# Patient Record
Sex: FEMALE | Race: WHITE | NOT HISPANIC OR LATINO | Employment: OTHER | URBAN - METROPOLITAN AREA
[De-identification: names, ages, dates, MRNs, and addresses within clinical notes are randomized per-mention and may not be internally consistent; named-entity substitution may affect disease eponyms.]

---

## 2018-01-18 ENCOUNTER — OFFICE VISIT (OUTPATIENT)
Dept: URGENT CARE | Age: 73
End: 2018-01-18
Payer: COMMERCIAL

## 2018-01-18 PROCEDURE — 99213 OFFICE O/P EST LOW 20 MIN: CPT | Performed by: FAMILY MEDICINE

## 2018-01-19 NOTE — PROGRESS NOTES
Assessment   1  Acute upper respiratory infection (465 9) (J06 9)    Plan   Acute upper respiratory infection    · Amoxicillin 500 MG Oral Capsule; TAKE 1 CAPSULE 3 TIMES DAILY UNTIL GONE   · Benzonatate 200 MG Oral Capsule; TAKE 1 CAPSULE 2-3 TIMES DAILY (every 8-12    hours as needed)    Discussion/Summary   Discussion Summary:    Rest, limit activity  3 times a day until finished ( please take probiotics)  Perles 2-3 times a day (every 8-12 hours) as needed for cough  or Advil / Motrin as needed  follow-up with family physician  go to the hospital emergency department if worse  Medication Side Effects Reviewed: Possible side effects of new medications were reviewed with the patient/guardian today  Understands and agrees with treatment plan: The treatment plan was reviewed with the patient/guardian  The patient/guardian understands and agrees with the treatment plan    Counseling Documentation With Imm: The patient was counseled regarding  Chief Complaint   1  Cough  Chief Complaint Free Text Note Form: cough x2 weeks      History of Present Illness   HPI: Cough for the past 2 weeks    Hospital Based Practices Required Assessment:      Abuse And Domestic Violence Screen       Yes, the patient is safe at home  -- The patient states no one is hurting them  Depression And Suicide Screen  No, the patient has not had thoughts of hurting themself  No, the patient has not felt depressed in the past 7 days  Prefered Language is  [place]  Primary Language is  English  Review of Systems   Focused-Female:      Constitutional: as noted in HPI       ENT: no ear ache, no loss of hearing, no nosebleeds or nasal discharge, no sore throat or hoarseness  Cardiovascular: no complaints of slow or fast heart rate, no chest pain, no palpitations, no leg claudication or lower extremity edema  Respiratory: cough, but-- as noted in HPI        Breasts: no complaints of breast pain, breast lump or nipple discharge  Gastrointestinal: no complaints of abdominal pain, no constipation, no nausea or diarrhea, no vomiting, no bloody stools  Genitourinary: no complaints of dysuria, no incontinence, no pelvic pain, no dysmenorrhea, no vaginal discharge or abnormal vaginal bleeding  Musculoskeletal: no complaints of arthralgia, no myalgia, no joint swelling or stiffness, no limb pain or swelling  Integumentary: no complaints of skin rash or lesion, no itching or dry skin, no skin wounds  Neurological: no complaints of headache, no confusion, no numbness or tingling, no dizziness or fainting  ROS Reviewed:    ROS reviewed  Active Problems   1  Closed fracture of distal end of radius (813 42) (Q33 317A)    Past Medical History   Active Problems And Past Medical History Reviewed: The active problems and past medical history were reviewed and updated today  Family History   Family History Reviewed: The family history was reviewed and updated today  Social History   Social History Reviewed: The social history was reviewed and updated today  The social history was reviewed and is unchanged  Surgical History   1  History of Laparoscopic Colpopexy   2  History of Tonsillectomy  Surgical History Reviewed: The surgical history was reviewed and updated today  Current Meds    1  Pantoprazole Sodium 40 MG Oral Tablet Delayed Release Recorded  Medication List Reviewed: The medication list was reviewed and updated today  Allergies   1  No Known Drug Allergies    Vitals   Signs   Recorded: [id number] 10:49AM   Temperature: 99 3 F  Heart Rate: 78  Respiration: 16  Systolic: 555  Diastolic: 74  Height: 5 ft 8 in  Weight: 164 lb   BMI Calculated: 24 94  BSA Calculated: 1 88  O2 Saturation: 96  LMP: 2000    Physical Exam        Constitutional Patient appears ill        Ears, Nose, Mouth, and Throat      External inspection of ears and nose: Normal  Otoscopic examination: Tympanic membranes translucent with normal light reflex  Canals patent without erythema  -- slight nasal congestion  -- injection of the oropharynx  Pulmonary      Respiratory effort: No increased work of breathing or signs of respiratory distress  -- coarse breath sounds  Cardiovascular      Auscultation of heart: Normal rate and rhythm, normal S1 and S2, without murmurs  Lymphatic      Palpation of lymph nodes in neck: No lymphadenopathy  Skin fair color and turgor  Neurologic grossly intact, no nuchal rigidity        Psychiatric      Orientation to person, place, and time: Normal        Mood and affect: Normal        Signatures    Electronically signed by : Hema Chapa DO; Jan 18 2018 11:27AM EST                       (Author)

## 2018-01-23 VITALS
RESPIRATION RATE: 16 BRPM | HEIGHT: 68 IN | BODY MASS INDEX: 24.86 KG/M2 | TEMPERATURE: 99.3 F | DIASTOLIC BLOOD PRESSURE: 74 MMHG | HEART RATE: 78 BPM | WEIGHT: 164 LBS | SYSTOLIC BLOOD PRESSURE: 147 MMHG | OXYGEN SATURATION: 96 %

## 2018-01-25 ENCOUNTER — OFFICE VISIT (OUTPATIENT)
Dept: FAMILY MEDICINE CLINIC | Facility: CLINIC | Age: 73
End: 2018-01-25
Payer: COMMERCIAL

## 2018-01-25 VITALS
RESPIRATION RATE: 16 BRPM | SYSTOLIC BLOOD PRESSURE: 112 MMHG | DIASTOLIC BLOOD PRESSURE: 78 MMHG | WEIGHT: 165.2 LBS | HEIGHT: 68 IN | HEART RATE: 88 BPM | BODY MASS INDEX: 25.04 KG/M2

## 2018-01-25 DIAGNOSIS — Z11.59 ENCOUNTER FOR HEPATITIS C SCREENING TEST FOR LOW RISK PATIENT: ICD-10-CM

## 2018-01-25 DIAGNOSIS — Z13.820 OSTEOPOROSIS SCREENING: ICD-10-CM

## 2018-01-25 DIAGNOSIS — Z12.31 ENCOUNTER FOR MAMMOGRAM TO ESTABLISH BASELINE MAMMOGRAM: ICD-10-CM

## 2018-01-25 DIAGNOSIS — Z00.00 MEDICARE ANNUAL WELLNESS VISIT, INITIAL: Primary | ICD-10-CM

## 2018-01-25 PROCEDURE — 1101F PT FALLS ASSESS-DOCD LE1/YR: CPT | Performed by: FAMILY MEDICINE

## 2018-01-25 PROCEDURE — G0438 PPPS, INITIAL VISIT: HCPCS | Performed by: FAMILY MEDICINE

## 2018-01-25 PROCEDURE — 1036F TOBACCO NON-USER: CPT | Performed by: FAMILY MEDICINE

## 2018-01-25 PROCEDURE — 3008F BODY MASS INDEX DOCD: CPT | Performed by: FAMILY MEDICINE

## 2018-01-25 RX ORDER — AMOXICILLIN 500 MG/1
1 CAPSULE ORAL 3 TIMES DAILY
COMMUNITY
Start: 2018-01-18 | End: 2018-01-28

## 2018-01-25 RX ORDER — PANTOPRAZOLE SODIUM 40 MG/1
TABLET, DELAYED RELEASE ORAL DAILY
COMMUNITY

## 2018-01-25 NOTE — PROGRESS NOTES
Assessment/Plan:        1  Medicare annual wellness visit, initial  - Lipid Panel with Direct LDL reflex; Future  - Comprehensive metabolic panel  - CBC and differential; Future  - Lipid Panel with Direct LDL reflex  - CBC and differential    2  Encounter for mammogram to establish baseline mammogram  - Mammo screening bilateral w cad    3  Encounter for hepatitis C screening test for low risk patient    - Hepatitis C Qualitative by PCR; Future  - Hepatitis C Qualitative by PCR    4  Osteoporosis screening    - DXA bone density spine hip and pelvis    Subjective:Pt here for Medicare Wellness Visit     Patient ID: Abbi Ortega is a 81 y o  female  Review of Systems   Constitutional: Negative  HENT: Negative  Eyes: Negative  Respiratory: Negative  Cardiovascular: Negative  Gastrointestinal: Negative  Endocrine: Negative  Genitourinary: Negative  Musculoskeletal: Negative  Skin: Negative  Allergic/Immunologic: Negative  Neurological: Negative  Hematological: Negative  Psychiatric/Behavioral: Negative  Objective:     Physical Exam   Constitutional: She is oriented to person, place, and time  Vital signs are normal  She appears well-developed and well-nourished  HENT:   Head: Normocephalic and atraumatic  Nose: Nose normal    Mouth/Throat: Oropharynx is clear and moist    Eyes: Pupils are equal, round, and reactive to light  Neck: Normal range of motion  Neck supple  No thyromegaly present  Cardiovascular: Normal rate and regular rhythm  No murmur heard  Pulmonary/Chest: Effort normal and breath sounds normal    Abdominal: Soft  Bowel sounds are normal    Musculoskeletal: Normal range of motion  She exhibits no edema or deformity  Neurological: She is alert and oriented to person, place, and time  She has normal reflexes  Skin: Skin is warm  No rash noted  No erythema  Psychiatric: She has a normal mood and affect   Her behavior is normal  HPI:  Juani Bell is a 81 y o  female here for her Initial Wellness Visit  Past Medical History:   Diagnosis Date    Arthritis     Endometriosis     GERD (gastroesophageal reflux disease)      Past Surgical History:   Procedure Laterality Date    COLPOPEXY      laparoscopic    TONSILLECTOMY       Family History   Problem Relation Age of Onset    Heart failure Mother     Dementia Mother     Atrial fibrillation Mother     Pancreatic cancer Father     Sleep apnea Brother     Atrial fibrillation Brother     Breast cancer Paternal Aunt      History   Smoking Status    Former Smoker   Smokeless Tobacco    Never Used     History   Alcohol Use    3 0 oz/week    5 Glasses of wine per week      History   Drug Use No       Current Outpatient Prescriptions   Medication Sig Dispense Refill    amoxicillin (AMOXIL) 500 mg capsule Take 1 capsule by mouth 3 (three) times a day      pantoprazole (PROTONIX) 40 mg tablet Take by mouth       No current facility-administered medications for this visit        Allergies   Allergen Reactions    Seasonal Ic [Cholestatin] Sneezing     Immunization History   Administered Date(s) Administered    Pneumococcal Conjugate PCV 7 01/24/2015    Tdap 01/24/2014    Zoster 01/24/2015       Patient Care Team:  Raz Santiago MD as PCP - General (Family Medicine)  Raz Santiago MD (Family Medicine)    Medicare Screening Tests and Risk Assessments:  AWV Clinical     ISAR:   Previous hospitalizations?:  No       Once in a Lifetime Medicare Screening:   EKG performed?:  No    AAA screening performed? (if performed, please add date to Health Maintenance):  No       Medicare Screening Tests and Risk Assessment:   AAA Risk Assessment     Tobacco use (males only):  No   Age over 72 (males only):  No Family history of AAA:  No   Osteoporosis Risk Assessment     Female:  Yes   :  Yes :  No   Age over 48:  Yes Low body weight (<127lbs):  No   Tobacco use:  No Alcohol use:  Yes   Low calcium diet:  No PMHX of fractures:  No   FHX of fractures:  No    HIV Risk Assessment    None indicated:  Yes        Drug and Alcohol Use:   Tobacco use    Cigarettes:  former smoker    Smokeless:  never used smokeless tobacco    Tobacco use duration    Tobacco Cessation Readiness    Alcohol use    Alcohol use:  rare use    Concern about alcohol use:  No Tolerance to alcohol:  No   Attempts to cut down:  No Guilt about use:  No   Patient concern:  No Annoyed by criticism:  No   Morning drinking:  No Family concern:  No   Alcohol Treatment Readiness   Illicit Drug Use    Drug use:  never    Drug type:  no sedative use       Diet & Exercise:   Diet   What is your diet?:  Regular   How many servings a day of the following:   Fruits and Vegetables:  3-4    Coffee:  1    Exercise    Do you currently exercise?:  yes    Frequency:  occasional    Type of exercise:  cardio, walking       Cognitive Impairment Screening:   Anxiety screenings preformed:  Yes    Depression screening preformed:  Yes    Geriatric Depression scale score:  0    Depression screening results:  negative for symptoms   Cognitive Impairment Screening    Do you have difficulty learning or retaining new information?:  No Do you have difficulty handling new tasks?:  No   Do you have difficulty with reasoning?:  No Do you have difficulty with spatial ability and orientation?:  No   Do you have difficulty with language?:  No Do you have difficulty with behavior?:  No       Functional Ability/Level of Safety:   Hearing    Hearing difficulties:  Yes Bilateral:  slightly decreased   Left:  slightly decreased Right:  slightly decreased   Hearing aid:  No    Hearing Impairment Assessment    Hearing status:  No impairment   Current Activities    Status:  unlimited ADL's, unlimited driving, unlimited IADL's, unlimited social activities   Help needed with the folllowing:    Using the phone:  No Transportation:  No   Shopping:  No Preparing Meals: No   Doing Housework:  No Doing Laundry:  No   Managing Medications:  No Managing Money:  No   ADL    Feeding:  Independant   Oral hygiene and Facial grooming:  Independant   Bathing:  Independant   Upper Body Dressing:  Independant   Lower Body Dressing:  Independant   Toileting:  Independant   Bed Mobility:  Independant   Fall Risk   Have you fallen in the last 12 months?:  No Are you unsteady on your feet?:  No    Are you taking any medications that may cause fatigue or dizziness?:  No    Do you rush to the bathroom potentially risking a fall?:  No   Injury History   Polypharmacy:  No Antidepressant Use:  No   Sedative Use:  No Antihypertensive Use:  No   Previous Fall:  No Alcohol Use:  No   Deconditioning:  No Visual Impairment:  No   Cogitive Impairment:  No Mmobility Impairment:  No   Postural Hypotension:  No Urinary Incontinence:  No       Home Safety:   Are there hazards in your environment?:  No   If you fell, would you need help to get back up from the ground?:  No Do you have problems or concerns getting in/out of a bed, chair, tub, or toilet?:  No   Do you feel unsteady when walking?:  No Is your activity limited by pain?:  No   Do you have handrails and grab-bars in the home?:  No Are emergency numbers kept by the phone and regularly updated?:  No   Are you and/or family members aware of the dangers of smoking in bed?:  No Are firearms stored securely?:  No   Do you have working smoke alarms and fire extinguisher?:  No    Have you left the stove on unsupervised?:  No    Home Safety Risk Factors   Unfamilar with surroundings:  No Uneven floors:  No   Stairs or handrail saftey risk:  No Loose rugs:  No   Household clutter:  No Poor household lighting:  No   No grab bars in bathroom:  No Further evaluation needed:  No       Advanced Directives:   Advanced Directives    Living Will:  No Durable POA for healthcare:  No   Advanced directive:  No    Patient's End of Life Decisions    Reviewed with patient: Yes        Urinary Incontinence:   Do you have urinary incontinence?:  No Do you have incomplete emptying?:  No   Do you urinate frequently?:  No Do you have urinary urgency?:  No   Do you have urinary hesitancy?:  No Do you have dysuria (painful and/or difficult urination)?:  No   Do you have nocturia (waking up to urinate)?:  No Do you strain when urinating (have to push to urinate)?:  No   Do you have a weak stream when urinating?:  No Do you have intermittent streaming when urinating?:  No   Do you dribble urine after finishing?:  No    Do you have vaginal pressure?:  No Do you have vaginal dryness?:  No       Glaucoma:              Reviewed Updated  Lu's Prior Wellness Visits:   Last Medicare wellness visit information was reviewed, patient interviewed , no change since last AWV

## 2019-01-25 ENCOUNTER — LAB REQUISITION (OUTPATIENT)
Dept: LAB | Facility: HOSPITAL | Age: 74
End: 2019-01-25
Payer: COMMERCIAL

## 2019-01-25 DIAGNOSIS — K22.70 BARRETT'S ESOPHAGUS WITHOUT DYSPLASIA: ICD-10-CM

## 2019-01-25 PROCEDURE — 88305 TISSUE EXAM BY PATHOLOGIST: CPT | Performed by: PATHOLOGY

## 2019-01-31 ENCOUNTER — OFFICE VISIT (OUTPATIENT)
Dept: FAMILY MEDICINE CLINIC | Facility: CLINIC | Age: 74
End: 2019-01-31
Payer: COMMERCIAL

## 2019-01-31 VITALS
WEIGHT: 168 LBS | OXYGEN SATURATION: 97 % | DIASTOLIC BLOOD PRESSURE: 76 MMHG | TEMPERATURE: 98.6 F | HEART RATE: 82 BPM | HEIGHT: 68 IN | BODY MASS INDEX: 25.46 KG/M2 | RESPIRATION RATE: 15 BRPM | SYSTOLIC BLOOD PRESSURE: 108 MMHG

## 2019-01-31 DIAGNOSIS — Z13.220 LIPID SCREENING: ICD-10-CM

## 2019-01-31 DIAGNOSIS — Z23 ENCOUNTER FOR ADMINISTRATION OF VACCINE: ICD-10-CM

## 2019-01-31 DIAGNOSIS — Z11.59 ENCOUNTER FOR HEPATITIS C SCREENING TEST FOR LOW RISK PATIENT: ICD-10-CM

## 2019-01-31 DIAGNOSIS — Z12.31 VISIT FOR SCREENING MAMMOGRAM: ICD-10-CM

## 2019-01-31 DIAGNOSIS — Z00.00 MEDICARE ANNUAL WELLNESS VISIT, SUBSEQUENT: Primary | ICD-10-CM

## 2019-01-31 PROBLEM — K22.70 BARRETT'S ESOPHAGUS WITHOUT DYSPLASIA: Status: ACTIVE | Noted: 2019-01-31

## 2019-01-31 PROCEDURE — 3008F BODY MASS INDEX DOCD: CPT | Performed by: FAMILY MEDICINE

## 2019-01-31 PROCEDURE — 3725F SCREEN DEPRESSION PERFORMED: CPT | Performed by: FAMILY MEDICINE

## 2019-01-31 PROCEDURE — G0439 PPPS, SUBSEQ VISIT: HCPCS | Performed by: FAMILY MEDICINE

## 2019-01-31 PROCEDURE — 1160F RVW MEDS BY RX/DR IN RCRD: CPT

## 2019-01-31 PROCEDURE — 1036F TOBACCO NON-USER: CPT | Performed by: FAMILY MEDICINE

## 2019-01-31 PROCEDURE — G0009 ADMIN PNEUMOCOCCAL VACCINE: HCPCS

## 2019-01-31 PROCEDURE — 1170F FXNL STATUS ASSESSED: CPT | Performed by: FAMILY MEDICINE

## 2019-01-31 PROCEDURE — 1125F AMNT PAIN NOTED PAIN PRSNT: CPT | Performed by: FAMILY MEDICINE

## 2019-01-31 PROCEDURE — 4040F PNEUMOC VAC/ADMIN/RCVD: CPT

## 2019-01-31 PROCEDURE — 90732 PPSV23 VACC 2 YRS+ SUBQ/IM: CPT

## 2019-01-31 PROCEDURE — 1101F PT FALLS ASSESS-DOCD LE1/YR: CPT | Performed by: FAMILY MEDICINE

## 2019-01-31 RX ORDER — MELATONIN
1000 DAILY
COMMUNITY

## 2019-01-31 RX ORDER — RIBOFLAVIN (VITAMIN B2) 100 MG
100 TABLET ORAL DAILY
COMMUNITY
End: 2020-02-05

## 2019-01-31 NOTE — PROGRESS NOTES
Assessment and Plan:    Problem List Items Addressed This Visit     None      Visit Diagnoses     Medicare annual wellness visit, subsequent    -  Primary    Relevant Orders    Comprehensive metabolic panel    Lipid screening        Relevant Orders    Lipid Panel with Direct LDL reflex    Encounter for hepatitis C screening test for low risk patient        Relevant Orders    Hepatitis C antibody    Visit for screening mammogram        Relevant Orders    Mammo screening bilateral w cad    Encounter for administration of vaccine        Relevant Orders    PNEUMOCOCCAL POLYSACCHARIDE VACCINE 23-VALENT =>1YO SQ IM        Health Maintenance Due   Topic Date Due    Hepatitis C Screening  1945    MAMMOGRAM  1945    Pneumococcal PPSV23/PCV13 65+ Years / Low and Medium Risk (1 of 2 - PCV13) 10/24/2012    Medicare Annual Wellness Visit (AWV)  01/25/2019         HPI:  Devendra Ritchie is a 72 y o  female here for her Subsequent Wellness Visit      Patient Active Problem List   Diagnosis    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     Past Medical History:   Diagnosis Date    Arthritis     Endometriosis     GERD (gastroesophageal reflux disease)      Past Surgical History:   Procedure Laterality Date    COLPOPEXY      laparoscopic    TONSILLECTOMY       Family History   Problem Relation Age of Onset    Heart failure Mother     Dementia Mother     Atrial fibrillation Mother     Pancreatic cancer Father     Sleep apnea Brother     Atrial fibrillation Brother     Breast cancer Paternal Aunt      History   Smoking Status    Former Smoker   Smokeless Tobacco    Never Used     History   Alcohol Use    3 0 oz/week    5 Glasses of wine per week      History   Drug Use No       Current Outpatient Prescriptions   Medication Sig Dispense Refill    Ascorbic Acid (VITAMIN C) 100 MG tablet Take 100 mg by mouth daily      cholecalciferol (VITAMIN D3) 1,000 units tablet Take 1,000 Units by mouth daily      pantoprazole (PROTONIX) 40 mg tablet Take by mouth       No current facility-administered medications for this visit  Allergies   Allergen Reactions    Seasonal Ic [Cholestatin] Sneezing     Immunization History   Administered Date(s) Administered    Influenza 12/15/2018    Pneumococcal Conjugate PCV 7 01/24/2015    Tdap 01/24/2014    Zoster 01/24/2015       Patient Care Team:  Surekha Montana MD as PCP - General (Family Medicine)  Surekha Montana MD (Family Medicine)    Medicare Screening Tests and Risk Assessments:  Last Medicare Wellness visit information reviewed, patient interviewed, no change since last AWV  Health Risk Assessment:  Patient rates overall health as very good  Patient feels that their physical health rating is Slightly better  Eyesight was rated as Same  Hearing was rated as Slightly worse  Patient feels that their emotional and mental health rating is Much better  Pain experienced by patient in the last 7 days has been None  Patient states that she has experienced no weight loss or gain in last 6 months  Emotional/Mental Health:  Patient has been feeling nervous/anxious  PHQ-9 Depression Screening:    Frequency of the following problems over the past two weeks:      1  Little interest or pleasure in doing things: 0 - not at all      2  Feeling down, depressed, or hopeless: 0 - not at all  PHQ-2 Score: 0          Broken Bones/Falls: Fall Risk Assessment:    In the past year, patient has experienced: No history of falling in past year          Bladder/Bowel:  Patient has not leaked urine accidently in the last six months  Patient reports no loss of bowel control  Immunizations:  Patient has had a flu vaccination within the last year  Patient has received a pneumonia shot  Patient has received a shingles shot  Patient has not received tetanus/diphtheria shot  Home Safety:  Patient does not have trouble with stairs inside or outside of their home     Patient currently reports that there are no safety hazards present in home, working smoke alarms, working carbon monoxide detectors  Preventative Screenings:   No breast cancer screening performed, colon cancer screen completed, cholesterol screen completed, glaucoma eye exam completed,     Nutrition:  Current diet: Regular with servings of the following:    Medications:  Patient is not currently taking any over-the-counter supplements  Patient is able to manage medications  Lifestyle Choices:  Patient reports no tobacco use  Patient has not smoked or used tobacco in the past   Patient reports alcohol use  Alcohol use per week: a glass of wine a night   Patient drives a vehicle  Patient wears seat belt  Activities of Daily Living:  Can get out of bed by his or her self, able to dress self, able to make own meals, able to do own shopping, able to bathe self, can do own laundry/housekeeping, can manage own money, pay bills and track expenses    Previous Hospitalizations:  No hospitalization or ED visit in past 12 months        Advanced Directives:  Patient has decided on a power of   Patient has spoken to designated power of   Patient has completed advanced directive

## 2019-02-20 DIAGNOSIS — E78.00 PURE HYPERCHOLESTEROLEMIA: Primary | ICD-10-CM

## 2019-02-20 LAB
ALBUMIN SERPL-MCNC: 4.5 G/DL (ref 3.6–5.1)
ALBUMIN/GLOB SERPL: 2 (CALC) (ref 1–2.5)
ALP SERPL-CCNC: 56 U/L (ref 33–130)
ALT SERPL-CCNC: 20 U/L (ref 6–29)
AST SERPL-CCNC: 18 U/L (ref 10–35)
BILIRUB SERPL-MCNC: 0.6 MG/DL (ref 0.2–1.2)
BUN SERPL-MCNC: 14 MG/DL (ref 7–25)
BUN/CREAT SERPL: NORMAL (CALC) (ref 6–22)
CALCIUM SERPL-MCNC: 9.8 MG/DL (ref 8.6–10.4)
CHLORIDE SERPL-SCNC: 103 MMOL/L (ref 98–110)
CHOLEST SERPL-MCNC: 291 MG/DL
CHOLEST/HDLC SERPL: 5.7 (CALC)
CO2 SERPL-SCNC: 28 MMOL/L (ref 20–32)
CREAT SERPL-MCNC: 0.82 MG/DL (ref 0.6–0.93)
GLOBULIN SER CALC-MCNC: 2.3 G/DL (CALC) (ref 1.9–3.7)
GLUCOSE SERPL-MCNC: 92 MG/DL (ref 65–99)
HCV AB S/CO SERPL IA: 0.02
HCV AB SERPL QL IA: NORMAL
HDLC SERPL-MCNC: 51 MG/DL
LDLC SERPL CALC-MCNC: 194 MG/DL (CALC)
NONHDLC SERPL-MCNC: 240 MG/DL (CALC)
POTASSIUM SERPL-SCNC: 4.3 MMOL/L (ref 3.5–5.3)
PROT SERPL-MCNC: 6.8 G/DL (ref 6.1–8.1)
SL AMB EGFR AFRICAN AMERICAN: 83 ML/MIN/1.73M2
SL AMB EGFR NON AFRICAN AMERICAN: 72 ML/MIN/1.73M2
SODIUM SERPL-SCNC: 140 MMOL/L (ref 135–146)
TRIGL SERPL-MCNC: 258 MG/DL

## 2019-02-20 RX ORDER — ATORVASTATIN CALCIUM 10 MG/1
10 TABLET, FILM COATED ORAL DAILY
Qty: 90 TABLET | Refills: 3 | Status: SHIPPED | OUTPATIENT
Start: 2019-02-20 | End: 2020-03-30

## 2019-07-25 DIAGNOSIS — Z12.39 ENCOUNTER FOR SCREENING FOR MALIGNANT NEOPLASM OF BREAST: Primary | ICD-10-CM

## 2020-02-05 ENCOUNTER — OFFICE VISIT (OUTPATIENT)
Dept: FAMILY MEDICINE CLINIC | Facility: CLINIC | Age: 75
End: 2020-02-05
Payer: COMMERCIAL

## 2020-02-05 VITALS
HEIGHT: 69 IN | DIASTOLIC BLOOD PRESSURE: 74 MMHG | OXYGEN SATURATION: 96 % | HEART RATE: 74 BPM | RESPIRATION RATE: 16 BRPM | BODY MASS INDEX: 25.36 KG/M2 | SYSTOLIC BLOOD PRESSURE: 100 MMHG | WEIGHT: 171.2 LBS | TEMPERATURE: 97.5 F

## 2020-02-05 DIAGNOSIS — K22.70 BARRETT'S ESOPHAGUS WITHOUT DYSPLASIA: ICD-10-CM

## 2020-02-05 DIAGNOSIS — E78.00 PURE HYPERCHOLESTEROLEMIA: ICD-10-CM

## 2020-02-05 DIAGNOSIS — Z00.00 MEDICARE ANNUAL WELLNESS VISIT, SUBSEQUENT: Primary | ICD-10-CM

## 2020-02-05 DIAGNOSIS — Z23 ENCOUNTER FOR ADMINISTRATION OF VACCINE: ICD-10-CM

## 2020-02-05 PROCEDURE — 1170F FXNL STATUS ASSESSED: CPT | Performed by: FAMILY MEDICINE

## 2020-02-05 PROCEDURE — 1160F RVW MEDS BY RX/DR IN RCRD: CPT | Performed by: FAMILY MEDICINE

## 2020-02-05 PROCEDURE — G0009 ADMIN PNEUMOCOCCAL VACCINE: HCPCS | Performed by: FAMILY MEDICINE

## 2020-02-05 PROCEDURE — 3288F FALL RISK ASSESSMENT DOCD: CPT | Performed by: FAMILY MEDICINE

## 2020-02-05 PROCEDURE — 90670 PCV13 VACCINE IM: CPT | Performed by: FAMILY MEDICINE

## 2020-02-05 PROCEDURE — 4040F PNEUMOC VAC/ADMIN/RCVD: CPT | Performed by: FAMILY MEDICINE

## 2020-02-05 PROCEDURE — 1036F TOBACCO NON-USER: CPT | Performed by: FAMILY MEDICINE

## 2020-02-05 PROCEDURE — 1125F AMNT PAIN NOTED PAIN PRSNT: CPT | Performed by: FAMILY MEDICINE

## 2020-02-05 PROCEDURE — 1101F PT FALLS ASSESS-DOCD LE1/YR: CPT | Performed by: FAMILY MEDICINE

## 2020-02-05 PROCEDURE — G0439 PPPS, SUBSEQ VISIT: HCPCS | Performed by: FAMILY MEDICINE

## 2020-02-05 PROCEDURE — 3008F BODY MASS INDEX DOCD: CPT | Performed by: FAMILY MEDICINE

## 2020-02-05 NOTE — PATIENT INSTRUCTIONS
Medicare Preventive Visit Patient Instructions  Thank you for completing your Welcome to Medicare Visit or Medicare Annual Wellness Visit today  Your next wellness visit will be due in one year (2/5/2021)  The screening/preventive services that you may require over the next 5-10 years are detailed below  Some tests may not apply to you based off risk factors and/or age  Screening tests ordered at today's visit but not completed yet may show as past due  Also, please note that scanned in results may not display below  Preventive Screenings:  Service Recommendations Previous Testing/Comments   Colorectal Cancer Screening  * Colonoscopy    * Fecal Occult Blood Test (FOBT)/Fecal Immunochemical Test (FIT)  * Fecal DNA/Cologuard Test  * Flexible Sigmoidoscopy Age: 54-65 years old   Colonoscopy: every 10 years (may be performed more frequently if at higher risk)  OR  FOBT/FIT: every 1 year  OR  Cologuard: every 3 years  OR  Sigmoidoscopy: every 5 years  Screening may be recommended earlier than age 48 if at higher risk for colorectal cancer  Also, an individualized decision between you and your healthcare provider will decide whether screening between the ages of 74-80 would be appropriate  Colonoscopy: 03/29/2018  FOBT/FIT: Not on file  Cologuard: Not on file  Sigmoidoscopy: Not on file         Breast Cancer Screening Age: 36 years old  Frequency: every 1-2 years  Not required if history of left and right mastectomy Mammogram: Not on file       Cervical Cancer Screening Between the ages of 21-29, pap smear recommended once every 3 years  Between the ages of 33-67, can perform pap smear with HPV co-testing every 5 years     Recommendations may differ for women with a history of total hysterectomy, cervical cancer, or abnormal pap smears in past  Pap Smear: Not on file       Hepatitis C Screening Once for adults born between [facility]  More frequently in patients at high risk for Hepatitis C Hep C Antibody: 02/19/2019       Diabetes Screening 1-2 times per year if you're at risk for diabetes or have pre-diabetes Fasting glucose: No results in last 5 years   A1C: No results in last 5 years       Cholesterol Screening Once every 5 years if you don't have a lipid disorder  May order more often based on risk factors  Lipid panel: 02/19/2019         Other Preventive Screenings Covered by Medicare:  1  Abdominal Aortic Aneurysm (AAA) Screening: covered once if your at risk  You're considered to be at risk if you have a family history of AAA  2  Lung Cancer Screening: covers low dose CT scan once per year if you meet all of the following conditions: (1) Age 50-69; (2) No signs or symptoms of lung cancer; (3) Current smoker or have quit smoking within the last 15 years; (4) You have a tobacco smoking history of at least 30 pack years (packs per day multiplied by number of years you smoked); (5) You get a written order from a healthcare provider  3  Glaucoma Screening: covered annually if you're considered high risk: (1) You have diabetes OR (2) Family history of glaucoma OR (3)  aged 48 and older OR (3)  American aged 72 and older  3  Osteoporosis Screening: covered every 2 years if you meet one of the following conditions: (1) You're estrogen deficient and at risk for osteoporosis based off medical history and other findings; (2) Have a vertebral abnormality; (3) On glucocorticoid therapy for more than 3 months; (4) Have primary hyperparathyroidism; (5) On osteoporosis medications and need to assess response to drug therapy  · Last bone density test (DXA Scan): Not on file  5  HIV Screening: covered annually if you're between the age of 12-76  Also covered annually if you are younger than 13 and older than 72 with risk factors for HIV infection  For pregnant patients, it is covered up to 3 times per pregnancy      Immunizations:  Immunization Recommendations   Influenza Vaccine Annual influenza vaccination during flu season is recommended for all persons aged >= 6 months who do not have contraindications   Pneumococcal Vaccine (Prevnar and Pneumovax)  * Prevnar = PCV13  * Pneumovax = PPSV23   Adults 25-60 years old: 1-3 doses may be recommended based on certain risk factors  Adults 72 years old: Prevnar (PCV13) vaccine recommended followed by Pneumovax (PPSV23) vaccine  If already received PPSV23 since turning 65, then PCV13 recommended at least one year after PPSV23 dose  Hepatitis B Vaccine 3 dose series if at intermediate or high risk (ex: diabetes, end stage renal disease, liver disease)   Tetanus (Td) Vaccine - COST NOT COVERED BY MEDICARE PART B Following completion of primary series, a booster dose should be given every 10 years to maintain immunity against tetanus  Td may also be given as tetanus wound prophylaxis  Tdap Vaccine - COST NOT COVERED BY MEDICARE PART B Recommended at least once for all adults  For pregnant patients, recommended with each pregnancy  Shingles Vaccine (Shingrix) - COST NOT COVERED BY MEDICARE PART B  2 shot series recommended in those aged 48 and above     Health Maintenance Due:      Topic Date Due    MAMMOGRAM  1945    CRC Screening: Colonoscopy  03/29/2028    Hepatitis C Screening  Completed     Immunizations Due:      Topic Date Due    Influenza Vaccine  07/01/2019    Pneumococcal Vaccine: 65+ Years (2 of 2 - PCV13) 01/31/2020     Advance Directives   What are advance directives? Advance directives are legal documents that state your wishes and plans for medical care  These plans are made ahead of time in case you lose your ability to make decisions for yourself  Advance directives can apply to any medical decision, such as the treatments you want, and if you want to donate organs  What are the types of advance directives? There are many types of advance directives, and each state has rules about how to use them   You may choose a combination of any of the following:  · Living will: This is a written record of the treatment you want  You can also choose which treatments you do not want, which to limit, and which to stop at a certain time  This includes surgery, medicine, IV fluid, and tube feedings  · Durable power of  for healthcare [facility]): This is a written record that states who you want to make healthcare choices for you when you are unable to make them for yourself  This person, called a proxy, is usually a family member or a friend  You may choose more than 1 proxy  · Do not resuscitate (DNR) order:  A DNR order is used in case your heart stops beating or you stop breathing  It is a request not to have certain forms of treatment, such as CPR  A DNR order may be included in other types of advance directives  · Medical directive: This covers the care that you want if you are in a coma, near death, or unable to make decisions for yourself  You can list the treatments you want for each condition  Treatment may include pain medicine, surgery, blood transfusions, dialysis, IV or tube feedings, and a ventilator (breathing machine)  · Values history: This document has questions about your views, beliefs, and how you feel and think about life  This information can help others choose the care that you would choose  Why are advance directives important? An advance directive helps you control your care  Although spoken wishes may be used, it is better to have your wishes written down  Spoken wishes can be misunderstood, or not followed  Treatments may be given even if you do not want them  An advance directive may make it easier for your family to make difficult choices about your care  Weight Management   Why it is important to manage your weight:  Being overweight increases your risk of health conditions such as heart disease, high blood pressure, type 2 diabetes, and certain types of cancer   It can also increase your risk for osteoarthritis, sleep apnea, and other respiratory problems  Aim for a slow, steady weight loss  Even a small amount of weight loss can lower your risk of health problems  How to lose weight safely:  A safe and healthy way to lose weight is to eat fewer calories and get regular exercise  You can lose up about 1 pound a week by decreasing the number of calories you eat by 500 calories each day  Healthy meal plan for weight management:  A healthy meal plan includes a variety of foods, contains fewer calories, and helps you stay healthy  A healthy meal plan includes the following:  · Eat whole-grain foods more often  A healthy meal plan should contain fiber  Fiber is the part of grains, fruits, and vegetables that is not broken down by your body  Whole-grain foods are healthy and provide extra fiber in your diet  Some examples of whole-grain foods are whole-wheat breads and pastas, oatmeal, brown rice, and bulgur  · Eat a variety of vegetables every day  Include dark, leafy greens such as spinach, kale, symone greens, and mustard greens  Eat yellow and orange vegetables such as carrots, sweet potatoes, and winter squash  · Eat a variety of fruits every day  Choose fresh or canned fruit (canned in its own juice or light syrup) instead of juice  Fruit juice has very little or no fiber  · Eat low-fat dairy foods  Drink fat-free (skim) milk or 1% milk  Eat fat-free yogurt and low-fat cottage cheese  Try low-fat cheeses such as mozzarella and other reduced-fat cheeses  · Choose meat and other protein foods that are low in fat  Choose beans or other legumes such as split peas or lentils  Choose fish, skinless poultry (chicken or turkey), or lean cuts of red meat (beef or pork)  Before you cook meat or poultry, cut off any visible fat  · Use less fat and oil  Try baking foods instead of frying them  Add less fat, such as margarine, sour cream, regular salad dressing and mayonnaise to foods   Eat fewer high-fat foods  Some examples of high-fat foods include french fries, doughnuts, ice cream, and cakes  · Eat fewer sweets  Limit foods and drinks that are high in sugar  This includes candy, cookies, regular soda, and sweetened drinks  Exercise:  Exercise at least 30 minutes per day on most days of the week  Some examples of exercise include walking, biking, dancing, and swimming  You can also fit in more physical activity by taking the stairs instead of the elevator or parking farther away from stores  Ask your healthcare provider about the best exercise plan for you  © Copyright [employer] 2018 Information is for End User's use only and may not be sold, redistributed or otherwise used for commercial purposes   All illustrations and images included in CareNotes® are the copyrighted property of A D A M , Inc  or [address] [place] [address]

## 2020-02-05 NOTE — PROGRESS NOTES
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    Relevant Orders    Comprehensive metabolic panel      Other Visit Diagnoses     Medicare annual wellness visit, subsequent    -  Primary    Encounter for administration of vaccine        Relevant Orders    PNEUMOCOCCAL CONJUGATE VACCINE 13-VALENT GREATER THAN 6 MONTHS    Pure hypercholesterolemia        Relevant Orders    Comprehensive metabolic panel    Lipid Panel with Direct LDL reflex    CBC and differential        BMI Counseling: Body mass index is 25 36 kg/m²  The BMI is above normal  Nutrition recommendations include decreasing portion sizes and encouraging healthy choices of fruits and vegetables  Exercise recommendations include moderate physical activity 150 minutes/week  No pharmacotherapy was ordered  Preventive health issues were discussed with patient, and age appropriate screening tests were ordered as noted in patient's After Visit Summary  Personalized health advice and appropriate referrals for health education or preventive services given if needed, as noted in patient's After Visit Summary       History of Present Illness:     Patient presents for Medicare Annual Wellness visit    Patient Care Team:  Migue Davis MD as PCP - General (Family Medicine)  Migue Davis MD (Family Medicine)     Problem List:     Patient Active Problem List   Diagnosis    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      Past Medical and Surgical History:     Past Medical History:   Diagnosis Date    Arthritis     Endometriosis     GERD (gastroesophageal reflux disease)      Past Surgical History:   Procedure Laterality Date    BELPHAROPTOSIS REPAIR Bilateral     COLPOPEXY      laparoscopic    TONSILLECTOMY        Family History:     Family History   Problem Relation Age of Onset    Heart failure Mother     Dementia Mother     Atrial fibrillation Mother     Pancreatic cancer Father  Sleep apnea Brother     Atrial fibrillation Brother     Breast cancer Paternal Aunt       Social History:     Social History     Socioeconomic History    Marital status: /Civil Union     Spouse name: None    Number of children: None    Years of education: None    Highest education level: None   Occupational History    None   Social Needs    Financial resource strain: None    Food insecurity:     Worry: None     Inability: None    Transportation needs:     Medical: None     Non-medical: None   Tobacco Use    Smoking status: Former Smoker    Smokeless tobacco: Never Used   Substance and Sexual Activity    Alcohol use: Yes     Alcohol/week: 5 0 standard drinks     Types: 5 Glasses of wine per week    Drug use: No    Sexual activity: Never   Lifestyle    Physical activity:     Days per week: None     Minutes per session: None    Stress: None   Relationships    Social connections:     Talks on phone: None     Gets together: None     Attends Spiritism service: None     Active member of club or organization: None     Attends meetings of clubs or organizations: None     Relationship status: None    Intimate partner violence:     Fear of current or ex partner: None     Emotionally abused: None     Physically abused: None     Forced sexual activity: None   Other Topics Concern    None   Social History Narrative    None       Medications and Allergies:     Current Outpatient Medications   Medication Sig Dispense Refill    atorvastatin (LIPITOR) 10 mg tablet Take 1 tablet (10 mg total) by mouth daily 90 tablet 3    cholecalciferol (VITAMIN D3) 1,000 units tablet Take 1,000 Units by mouth daily      pantoprazole (PROTONIX) 40 mg tablet Take by mouth       No current facility-administered medications for this visit        Allergies   Allergen Reactions    Seasonal Ic [Cholestatin] Sneezing      Immunizations:     Immunization History   Administered Date(s) Administered    INFLUENZA 10/18/2018, 12/15/2018    Pneumococcal Conjugate PCV 7 01/24/2015    Pneumococcal Polysaccharide PPV23 01/31/2019    Tdap 01/24/2014    Zoster 05/14/2014, 01/24/2015      Health Maintenance:         Topic Date Due    MAMMOGRAM  1945    CRC Screening: Colonoscopy  03/29/2028    Hepatitis C Screening  Completed         Topic Date Due    Pneumococcal Vaccine: 65+ Years (2 of 2 - PCV13) 01/31/2020      Medicare Health Risk Assessment:     /74 (BP Location: Left arm, Patient Position: Sitting, Cuff Size: Standard)   Pulse 74   Temp 97 5 °F (36 4 °C) (Tympanic)   Resp 16   Ht 5' 8 9" (1 75 m)   Wt 77 7 kg (171 lb 3 2 oz)   SpO2 96%   BMI 25 36 kg/m²      Willow Figures is here for her Subsequent Wellness visit  Health Risk Assessment:   Patient rates overall health as very good  Patient feels that their physical health rating is same  Eyesight was rated as same  Hearing was rated as same  Patient feels that their emotional and mental health rating is much better  Pain experienced in the last 7 days has been some  Patient's pain rating has been 1/10  Patient states that she has experienced no weight loss or gain in last 6 months  Depression Screening:   PHQ-2 Score: 0      Fall Risk Screening: In the past year, patient has experienced: no history of falling in past year      Urinary Incontinence Screening:   Patient has not leaked urine accidently in the last six months  Home Safety:  Patient does not have trouble with stairs inside or outside of their home  Patient has working smoke alarms and has working carbon monoxide detector  Home safety hazards include: none  Nutrition:   Current diet is Regular  Medications:   Patient is currently taking over-the-counter supplements  OTC medications include: see medication list  Patient is able to manage medications       Activities of Daily Living (ADLs)/Instrumental Activities of Daily Living (IADLs):   Walk and transfer into and out of bed and chair?: Yes  Dress and groom yourself?: Yes    Bathe or shower yourself?: Yes    Feed yourself? Yes  Do your laundry/housekeeping?: Yes  Manage your money, pay your bills and track your expenses?: Yes  Make your own meals?: Yes    Do your own shopping?: Yes    Previous Hospitalizations:   Any hospitalizations or ED visits within the last 12 months?: No      Advance Care Planning:   Living will: Yes    Durable POA for healthcare:  Yes    Advanced directive: Yes      Cognitive Screening:   Provider or family/friend/caregiver concerned regarding cognition?: No    PREVENTIVE SCREENINGS      Cardiovascular Screening:    General: Screening Not Indicated and History Lipid Disorder      Diabetes Screening:     General: Screening Current      Colorectal Cancer Screening:     General: Screening Current      Breast Cancer Screening:       Due for: Mammogram        Cervical Cancer Screening:    General: Screening Not Indicated      Abdominal Aortic Aneurysm (AAA) Screening:        General: Screening Not Indicated      Lung Cancer Screening:     General: Screening Not Indicated      Hepatitis C Screening:    General: Screening Current      Hema Garay MD

## 2020-02-25 LAB
ALBUMIN SERPL-MCNC: 4.4 G/DL (ref 3.6–5.1)
ALBUMIN/GLOB SERPL: 2 (CALC) (ref 1–2.5)
ALP SERPL-CCNC: 58 U/L (ref 37–153)
ALT SERPL-CCNC: 23 U/L (ref 6–29)
AST SERPL-CCNC: 18 U/L (ref 10–35)
BASOPHILS # BLD AUTO: 58 CELLS/UL (ref 0–200)
BASOPHILS NFR BLD AUTO: 0.9 %
BILIRUB SERPL-MCNC: 0.6 MG/DL (ref 0.2–1.2)
BUN SERPL-MCNC: 12 MG/DL (ref 7–25)
BUN/CREAT SERPL: NORMAL (CALC) (ref 6–22)
CALCIUM SERPL-MCNC: 9.4 MG/DL (ref 8.6–10.4)
CHLORIDE SERPL-SCNC: 104 MMOL/L (ref 98–110)
CHOLEST SERPL-MCNC: 182 MG/DL
CHOLEST/HDLC SERPL: 3.3 (CALC)
CO2 SERPL-SCNC: 29 MMOL/L (ref 20–32)
CREAT SERPL-MCNC: 0.87 MG/DL (ref 0.6–0.93)
EOSINOPHIL # BLD AUTO: 237 CELLS/UL (ref 15–500)
EOSINOPHIL NFR BLD AUTO: 3.7 %
ERYTHROCYTE [DISTWIDTH] IN BLOOD BY AUTOMATED COUNT: 12.3 % (ref 11–15)
GLOBULIN SER CALC-MCNC: 2.2 G/DL (CALC) (ref 1.9–3.7)
GLUCOSE SERPL-MCNC: 95 MG/DL (ref 65–99)
HCT VFR BLD AUTO: 43.5 % (ref 35–45)
HDLC SERPL-MCNC: 55 MG/DL
HGB BLD-MCNC: 14.2 G/DL (ref 11.7–15.5)
LDLC SERPL CALC-MCNC: 98 MG/DL (CALC)
LYMPHOCYTES # BLD AUTO: 2266 CELLS/UL (ref 850–3900)
LYMPHOCYTES NFR BLD AUTO: 35.4 %
MCH RBC QN AUTO: 31.8 PG (ref 27–33)
MCHC RBC AUTO-ENTMCNC: 32.6 G/DL (ref 32–36)
MCV RBC AUTO: 97.3 FL (ref 80–100)
MONOCYTES # BLD AUTO: 550 CELLS/UL (ref 200–950)
MONOCYTES NFR BLD AUTO: 8.6 %
NEUTROPHILS # BLD AUTO: 3290 CELLS/UL (ref 1500–7800)
NEUTROPHILS NFR BLD AUTO: 51.4 %
NONHDLC SERPL-MCNC: 127 MG/DL (CALC)
PLATELET # BLD AUTO: 339 THOUSAND/UL (ref 140–400)
PMV BLD REES-ECKER: 9.9 FL (ref 7.5–12.5)
POTASSIUM SERPL-SCNC: 4.2 MMOL/L (ref 3.5–5.3)
PROT SERPL-MCNC: 6.6 G/DL (ref 6.1–8.1)
RBC # BLD AUTO: 4.47 MILLION/UL (ref 3.8–5.1)
SL AMB EGFR AFRICAN AMERICAN: 77 ML/MIN/1.73M2
SL AMB EGFR NON AFRICAN AMERICAN: 67 ML/MIN/1.73M2
SODIUM SERPL-SCNC: 141 MMOL/L (ref 135–146)
TRIGL SERPL-MCNC: 196 MG/DL
WBC # BLD AUTO: 6.4 THOUSAND/UL (ref 3.8–10.8)

## 2020-03-28 DIAGNOSIS — E78.00 PURE HYPERCHOLESTEROLEMIA: ICD-10-CM

## 2020-03-30 RX ORDER — ATORVASTATIN CALCIUM 10 MG/1
TABLET, FILM COATED ORAL
Qty: 90 TABLET | Refills: 3 | Status: SHIPPED | OUTPATIENT
Start: 2020-03-30 | End: 2021-03-11

## 2020-12-14 ENCOUNTER — VBI (OUTPATIENT)
Dept: ADMINISTRATIVE | Facility: OTHER | Age: 75
End: 2020-12-14

## 2021-02-08 ENCOUNTER — OFFICE VISIT (OUTPATIENT)
Dept: FAMILY MEDICINE CLINIC | Facility: CLINIC | Age: 76
End: 2021-02-08
Payer: COMMERCIAL

## 2021-02-08 VITALS
HEIGHT: 68 IN | TEMPERATURE: 96.6 F | DIASTOLIC BLOOD PRESSURE: 82 MMHG | OXYGEN SATURATION: 99 % | WEIGHT: 168.4 LBS | HEART RATE: 78 BPM | BODY MASS INDEX: 25.52 KG/M2 | SYSTOLIC BLOOD PRESSURE: 112 MMHG

## 2021-02-08 DIAGNOSIS — E78.00 PURE HYPERCHOLESTEROLEMIA: ICD-10-CM

## 2021-02-08 DIAGNOSIS — Z12.31 VISIT FOR SCREENING MAMMOGRAM: ICD-10-CM

## 2021-02-08 DIAGNOSIS — Z13.820 OSTEOPOROSIS SCREENING: ICD-10-CM

## 2021-02-08 DIAGNOSIS — Z00.00 MEDICARE ANNUAL WELLNESS VISIT, SUBSEQUENT: Primary | ICD-10-CM

## 2021-02-08 PROCEDURE — 1125F AMNT PAIN NOTED PAIN PRSNT: CPT | Performed by: FAMILY MEDICINE

## 2021-02-08 PROCEDURE — 3725F SCREEN DEPRESSION PERFORMED: CPT | Performed by: FAMILY MEDICINE

## 2021-02-08 PROCEDURE — 3008F BODY MASS INDEX DOCD: CPT | Performed by: FAMILY MEDICINE

## 2021-02-08 PROCEDURE — 3288F FALL RISK ASSESSMENT DOCD: CPT | Performed by: FAMILY MEDICINE

## 2021-02-08 PROCEDURE — 1101F PT FALLS ASSESS-DOCD LE1/YR: CPT | Performed by: FAMILY MEDICINE

## 2021-02-08 PROCEDURE — 1160F RVW MEDS BY RX/DR IN RCRD: CPT | Performed by: FAMILY MEDICINE

## 2021-02-08 PROCEDURE — G0439 PPPS, SUBSEQ VISIT: HCPCS | Performed by: FAMILY MEDICINE

## 2021-02-08 PROCEDURE — 1036F TOBACCO NON-USER: CPT | Performed by: FAMILY MEDICINE

## 2021-02-08 PROCEDURE — 1170F FXNL STATUS ASSESSED: CPT | Performed by: FAMILY MEDICINE

## 2021-02-08 RX ORDER — MULTIVIT WITH MINERALS/LUTEIN
1000 TABLET ORAL DAILY
COMMUNITY

## 2021-02-08 NOTE — PATIENT INSTRUCTIONS
Medicare Preventive Visit Patient Instructions  Thank you for completing your Welcome to Medicare Visit or Medicare Annual Wellness Visit today  Your next wellness visit will be due in one year (2/8/2022)  The screening/preventive services that you may require over the next 5-10 years are detailed below  Some tests may not apply to you based off risk factors and/or age  Screening tests ordered at today's visit but not completed yet may show as past due  Also, please note that scanned in results may not display below  Preventive Screenings:  Service Recommendations Previous Testing/Comments   Colorectal Cancer Screening  * Colonoscopy    * Fecal Occult Blood Test (FOBT)/Fecal Immunochemical Test (FIT)  * Fecal DNA/Cologuard Test  * Flexible Sigmoidoscopy Age: 54-65 years old   Colonoscopy: every 10 years (may be performed more frequently if at higher risk)  OR  FOBT/FIT: every 1 year  OR  Cologuard: every 3 years  OR  Sigmoidoscopy: every 5 years  Screening may be recommended earlier than age 48 if at higher risk for colorectal cancer  Also, an individualized decision between you and your healthcare provider will decide whether screening between the ages of 74-80 would be appropriate  Colonoscopy: 03/29/2018  FOBT/FIT: Not on file  Cologuard: Not on file  Sigmoidoscopy: Not on file         Breast Cancer Screening Age: 36 years old  Frequency: every 1-2 years  Not required if history of left and right mastectomy Mammogram: Not on file       Cervical Cancer Screening Between the ages of 21-29, pap smear recommended once every 3 years  Between the ages of 33-67, can perform pap smear with HPV co-testing every 5 years     Recommendations may differ for women with a history of total hysterectomy, cervical cancer, or abnormal pap smears in past  Pap Smear: Not on file       Hepatitis C Screening Once for adults born between [facility]  More frequently in patients at high risk for Hepatitis C Hep C Antibody: 02/19/2019       Diabetes Screening 1-2 times per year if you're at risk for diabetes or have pre-diabetes Fasting glucose: No results in last 5 years   A1C: No results in last 5 years       Cholesterol Screening Once every 5 years if you don't have a lipid disorder  May order more often based on risk factors  Lipid panel: 02/24/2020         Other Preventive Screenings Covered by Medicare:  1  Abdominal Aortic Aneurysm (AAA) Screening: covered once if your at risk  You're considered to be at risk if you have a family history of AAA  2  Lung Cancer Screening: covers low dose CT scan once per year if you meet all of the following conditions: (1) Age 50-69; (2) No signs or symptoms of lung cancer; (3) Current smoker or have quit smoking within the last 15 years; (4) You have a tobacco smoking history of at least 30 pack years (packs per day multiplied by number of years you smoked); (5) You get a written order from a healthcare provider  3  Glaucoma Screening: covered annually if you're considered high risk: (1) You have diabetes OR (2) Family history of glaucoma OR (3)  aged 48 and older OR (3)  American aged 72 and older  3  Osteoporosis Screening: covered every 2 years if you meet one of the following conditions: (1) You're estrogen deficient and at risk for osteoporosis based off medical history and other findings; (2) Have a vertebral abnormality; (3) On glucocorticoid therapy for more than 3 months; (4) Have primary hyperparathyroidism; (5) On osteoporosis medications and need to assess response to drug therapy  · Last bone density test (DXA Scan): Not on file  5  HIV Screening: covered annually if you're between the age of 12-76  Also covered annually if you are younger than 13 and older than 72 with risk factors for HIV infection  For pregnant patients, it is covered up to 3 times per pregnancy      Immunizations:  Immunization Recommendations   Influenza Vaccine Annual influenza vaccination during flu season is recommended for all persons aged >= 6 months who do not have contraindications   Pneumococcal Vaccine (Prevnar and Pneumovax)  * Prevnar = PCV13  * Pneumovax = PPSV23   Adults 25-60 years old: 1-3 doses may be recommended based on certain risk factors  Adults 72 years old: Prevnar (PCV13) vaccine recommended followed by Pneumovax (PPSV23) vaccine  If already received PPSV23 since turning 65, then PCV13 recommended at least one year after PPSV23 dose  Hepatitis B Vaccine 3 dose series if at intermediate or high risk (ex: diabetes, end stage renal disease, liver disease)   Tetanus (Td) Vaccine - COST NOT COVERED BY MEDICARE PART B Following completion of primary series, a booster dose should be given every 10 years to maintain immunity against tetanus  Td may also be given as tetanus wound prophylaxis  Tdap Vaccine - COST NOT COVERED BY MEDICARE PART B Recommended at least once for all adults  For pregnant patients, recommended with each pregnancy  Shingles Vaccine (Shingrix) - COST NOT COVERED BY MEDICARE PART B  2 shot series recommended in those aged 48 and above     Health Maintenance Due:      Topic Date Due    MAMMOGRAM  1945    Colorectal Cancer Screening  03/29/2028    Hepatitis C Screening  Completed     Immunizations Due:      Topic Date Due    Influenza Vaccine (1) 09/01/2020     Advance Directives   What are advance directives? Advance directives are legal documents that state your wishes and plans for medical care  These plans are made ahead of time in case you lose your ability to make decisions for yourself  Advance directives can apply to any medical decision, such as the treatments you want, and if you want to donate organs  What are the types of advance directives? There are many types of advance directives, and each state has rules about how to use them  You may choose a combination of any of the following:  · Living will:   This is a written record of the treatment you want  You can also choose which treatments you do not want, which to limit, and which to stop at a certain time  This includes surgery, medicine, IV fluid, and tube feedings  · Durable power of  for healthcare [facility]): This is a written record that states who you want to make healthcare choices for you when you are unable to make them for yourself  This person, called a proxy, is usually a family member or a friend  You may choose more than 1 proxy  · Do not resuscitate (DNR) order:  A DNR order is used in case your heart stops beating or you stop breathing  It is a request not to have certain forms of treatment, such as CPR  A DNR order may be included in other types of advance directives  · Medical directive: This covers the care that you want if you are in a coma, near death, or unable to make decisions for yourself  You can list the treatments you want for each condition  Treatment may include pain medicine, surgery, blood transfusions, dialysis, IV or tube feedings, and a ventilator (breathing machine)  · Values history: This document has questions about your views, beliefs, and how you feel and think about life  This information can help others choose the care that you would choose  Why are advance directives important? An advance directive helps you control your care  Although spoken wishes may be used, it is better to have your wishes written down  Spoken wishes can be misunderstood, or not followed  Treatments may be given even if you do not want them  An advance directive may make it easier for your family to make difficult choices about your care  Weight Management   Why it is important to manage your weight:  Being overweight increases your risk of health conditions such as heart disease, high blood pressure, type 2 diabetes, and certain types of cancer  It can also increase your risk for osteoarthritis, sleep apnea, and other respiratory problems   Aim for a slow, steady weight loss  Even a small amount of weight loss can lower your risk of health problems  How to lose weight safely:  A safe and healthy way to lose weight is to eat fewer calories and get regular exercise  You can lose up about 1 pound a week by decreasing the number of calories you eat by 500 calories each day  Healthy meal plan for weight management:  A healthy meal plan includes a variety of foods, contains fewer calories, and helps you stay healthy  A healthy meal plan includes the following:  · Eat whole-grain foods more often  A healthy meal plan should contain fiber  Fiber is the part of grains, fruits, and vegetables that is not broken down by your body  Whole-grain foods are healthy and provide extra fiber in your diet  Some examples of whole-grain foods are whole-wheat breads and pastas, oatmeal, brown rice, and bulgur  · Eat a variety of vegetables every day  Include dark, leafy greens such as spinach, kale, symone greens, and mustard greens  Eat yellow and orange vegetables such as carrots, sweet potatoes, and winter squash  · Eat a variety of fruits every day  Choose fresh or canned fruit (canned in its own juice or light syrup) instead of juice  Fruit juice has very little or no fiber  · Eat low-fat dairy foods  Drink fat-free (skim) milk or 1% milk  Eat fat-free yogurt and low-fat cottage cheese  Try low-fat cheeses such as mozzarella and other reduced-fat cheeses  · Choose meat and other protein foods that are low in fat  Choose beans or other legumes such as split peas or lentils  Choose fish, skinless poultry (chicken or turkey), or lean cuts of red meat (beef or pork)  Before you cook meat or poultry, cut off any visible fat  · Use less fat and oil  Try baking foods instead of frying them  Add less fat, such as margarine, sour cream, regular salad dressing and mayonnaise to foods  Eat fewer high-fat foods   Some examples of high-fat foods include french fries, doughnuts, ice cream, and cakes  · Eat fewer sweets  Limit foods and drinks that are high in sugar  This includes candy, cookies, regular soda, and sweetened drinks  Exercise:  Exercise at least 30 minutes per day on most days of the week  Some examples of exercise include walking, biking, dancing, and swimming  You can also fit in more physical activity by taking the stairs instead of the elevator or parking farther away from stores  Ask your healthcare provider about the best exercise plan for you  © Copyright [employer] 2018 Information is for End User's use only and may not be sold, redistributed or otherwise used for commercial purposes   All illustrations and images included in CareNotes® are the copyrighted property of [employer]  or [address] [place], [place] [address]pe

## 2021-02-08 NOTE — PROGRESS NOTES
Assessment and Plan:     Problem List Items Addressed This Visit     None      Visit Diagnoses     Medicare annual wellness visit, subsequent    -  Primary    Pure hypercholesterolemia        Relevant Orders    Comprehensive metabolic panel    CBC and differential    Lipid Panel with Direct LDL reflex    Visit for screening mammogram        Relevant Orders    Mammo screening bilateral w cad    Osteoporosis screening        Relevant Orders    DXA bone density spine hip and pelvis        BMI Counseling: Body mass index is 25 35 kg/m²  The BMI is above normal  Nutrition recommendations include decreasing portion sizes and encouraging healthy choices of fruits and vegetables  Exercise recommendations include moderate physical activity 150 minutes/week  No pharmacotherapy was ordered  Preventive health issues were discussed with patient, and age appropriate screening tests were ordered as noted in patient's After Visit Summary  Personalized health advice and appropriate referrals for health education or preventive services given if needed, as noted in patient's After Visit Summary       History of Present Illness:     Patient presents for Medicare Annual Wellness visit  Didn't get her mammogram and dexa scan done as ordered     Patient Care Team:  Merlin Jumbo, MD as PCP - General (Family Medicine)  Merlin Jumbo, MD (Family Medicine)     Problem List:     Patient Active Problem List   Diagnosis    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      Past Medical and Surgical History:     Past Medical History:   Diagnosis Date    Arthritis     Endometriosis     GERD (gastroesophageal reflux disease)      Past Surgical History:   Procedure Laterality Date    BELPHAROPTOSIS REPAIR Bilateral     COLPOPEXY      laparoscopic    TONSILLECTOMY        Family History:     Family History   Problem Relation Age of Onset    Heart failure Mother     Dementia Mother     Atrial fibrillation Mother     Pancreatic cancer Father  Sleep apnea Brother     Atrial fibrillation Brother     Breast cancer Paternal Aunt       Social History:        Social History     Socioeconomic History    Marital status: /Civil Union     Spouse name: None    Number of children: None    Years of education: None    Highest education level: None   Occupational History    None   Social Needs    Financial resource strain: None    Food insecurity     Worry: None     Inability: None    Transportation needs     Medical: None     Non-medical: None   Tobacco Use    Smoking status: Former Smoker    Smokeless tobacco: Never Used   Substance and Sexual Activity    Alcohol use: Yes     Alcohol/week: 5 0 standard drinks     Types: 5 Glasses of wine per week    Drug use: No    Sexual activity: Never   Lifestyle    Physical activity     Days per week: None     Minutes per session: None    Stress: None   Relationships    Social connections     Talks on phone: None     Gets together: None     Attends [employer] service: None     Active member of club or organization: None     Attends meetings of clubs or organizations: None     Relationship status: None    Intimate partner violence     Fear of current or ex partner: None     Emotionally abused: None     Physically abused: None     Forced sexual activity: None   Other Topics Concern    None   Social History Narrative    None      Medications and Allergies:     Current Outpatient Medications   Medication Sig Dispense Refill    Ascorbic Acid (vitamin C) 1000 MG tablet Take 1,000 mg by mouth daily Take one tablet  By mouth daily   atorvastatin (LIPITOR) 10 mg tablet take 1 tablet by mouth once daily 90 tablet 3    b complex vitamins tablet Take 1 tablet by mouth daily Take one tablet by mouth daily   Calcium-Magnesium 500-250 MG TABS Take by mouth Take one tablet by mouth daily        cholecalciferol (VITAMIN D3) 1,000 units tablet Take 1,000 Units by mouth daily      Multiple Vitamins-Minerals (ZINC PO) Take by mouth Take one tablet by mouth daily   Omega-3 Fatty Acids (OMEGA 3 PO) Take by mouth Take one capsule by mouth daily   pantoprazole (PROTONIX) 40 mg tablet Take by mouth      Probiotic Product (PROBIOTIC PO) Take by mouth Take one tablet by mouth daily  No current facility-administered medications for this visit  Allergies   Allergen Reactions    Seasonal Ic [Cholestatin] Sneezing      Immunizations:     Immunization History   Administered Date(s) Administered    INFLUENZA 10/18/2018, 12/15/2018    Pneumococcal Conjugate 13-Valent 02/05/2020    Pneumococcal Conjugate PCV 7 01/24/2015    Pneumococcal Polysaccharide PPV23 01/31/2019    Tdap 01/24/2014    Zoster 05/14/2014, 01/24/2015      Health Maintenance:         Topic Date Due    MAMMOGRAM  1945    Colorectal Cancer Screening  03/29/2028    Hepatitis C Screening  Completed     There are no preventive care reminders to display for this patient  Medicare Health Risk Assessment:     /82 (BP Location: Left arm, Patient Position: Sitting, Cuff Size: Adult)   Pulse 78   Temp (!) 96 6 °F (35 9 °C) (Tympanic)   Ht 5' 8 35" (1 736 m)   Wt 76 4 kg (168 lb 6 4 oz)   SpO2 99%   BMI 25 35 kg/m²      Artur Chowdary is here for her Subsequent Wellness visit  Last Medicare Wellness visit information reviewed, patient interviewed, no change since last AWV  Health Risk Assessment:   Patient rates overall health as very good  Patient feels that their physical health rating is slightly better  Eyesight was rated as same  Hearing was rated as same  Patient feels that their emotional and mental health rating is same  Pain experienced in the last 7 days has been none  Patient states that she has experienced no weight loss or gain in last 6 months  Depression Screening:   PHQ-2 Score: 0      Fall Risk Screening:    In the past year, patient has experienced: no history of falling in past year Urinary Incontinence Screening:   Patient has not leaked urine accidently in the last six months  Home Safety:  Patient does not have trouble with stairs inside or outside of their home  Patient has working smoke alarms and has working carbon monoxide detector  Home safety hazards include: none  Nutrition:   Current diet is Regular and No Added Salt  Medications:   Patient is currently taking over-the-counter supplements  OTC medications include: see medication list  Patient is able to manage medications  Activities of Daily Living (ADLs)/Instrumental Activities of Daily Living (IADLs):   Walk and transfer into and out of bed and chair?: Yes  Dress and groom yourself?: Yes    Bathe or shower yourself?: Yes    Feed yourself?  Yes  Do your laundry/housekeeping?: Yes  Manage your money, pay your bills and track your expenses?: Yes  Make your own meals?: Yes    Do your own shopping?: Yes    Previous Hospitalizations:   Any hospitalizations or ED visits within the last 12 months?: No      Advance Care Planning:   Living will: No    Durable POA for healthcare: No    Advanced directive: No      Cognitive Screening:   Provider or family/friend/caregiver concerned regarding cognition?: No    PREVENTIVE SCREENINGS      Cardiovascular Screening:    General: Screening Not Indicated and History Lipid Disorder      Diabetes Screening:     General: Screening Current      Colorectal Cancer Screening:     General: Screening Current      Cervical Cancer Screening:    General: Screening Not Indicated      Osteoporosis Screening:    General: Risks and Benefits Discussed    Due for: DXA Appendicular      Abdominal Aortic Aneurysm (AAA) Screening:        General: Screening Not Indicated      Lung Cancer Screening:     General: Screening Not Indicated      Hepatitis C Screening:    General: Screening Current      Hema Garay MD

## 2021-03-02 DIAGNOSIS — Z23 ENCOUNTER FOR IMMUNIZATION: ICD-10-CM

## 2021-03-11 DIAGNOSIS — E78.00 PURE HYPERCHOLESTEROLEMIA: ICD-10-CM

## 2021-03-11 RX ORDER — ATORVASTATIN CALCIUM 10 MG/1
TABLET, FILM COATED ORAL
Qty: 90 TABLET | Refills: 3 | Status: SHIPPED | OUTPATIENT
Start: 2021-03-11 | End: 2022-04-01

## 2021-04-20 ENCOUNTER — VBI (OUTPATIENT)
Dept: ADMINISTRATIVE | Facility: OTHER | Age: 76
End: 2021-04-20

## 2021-04-20 LAB
ALBUMIN SERPL-MCNC: 4.2 G/DL (ref 3.6–5.1)
ALBUMIN/GLOB SERPL: 1.8 (CALC) (ref 1–2.5)
ALP SERPL-CCNC: 49 U/L (ref 37–153)
ALT SERPL-CCNC: 17 U/L (ref 6–29)
AST SERPL-CCNC: 16 U/L (ref 10–35)
BASOPHILS # BLD AUTO: 47 CELLS/UL (ref 0–200)
BASOPHILS NFR BLD AUTO: 0.8 %
BILIRUB SERPL-MCNC: 0.8 MG/DL (ref 0.2–1.2)
BUN SERPL-MCNC: 11 MG/DL (ref 7–25)
BUN/CREAT SERPL: NORMAL (CALC) (ref 6–22)
CALCIUM SERPL-MCNC: 9.9 MG/DL (ref 8.6–10.4)
CHLORIDE SERPL-SCNC: 102 MMOL/L (ref 98–110)
CHOLEST SERPL-MCNC: 160 MG/DL
CHOLEST/HDLC SERPL: 3.3 (CALC)
CO2 SERPL-SCNC: 31 MMOL/L (ref 20–32)
CREAT SERPL-MCNC: 0.7 MG/DL (ref 0.6–0.93)
EOSINOPHIL # BLD AUTO: 248 CELLS/UL (ref 15–500)
EOSINOPHIL NFR BLD AUTO: 4.2 %
ERYTHROCYTE [DISTWIDTH] IN BLOOD BY AUTOMATED COUNT: 11.9 % (ref 11–15)
GLOBULIN SER CALC-MCNC: 2.3 G/DL (CALC) (ref 1.9–3.7)
GLUCOSE SERPL-MCNC: 95 MG/DL (ref 65–99)
HCT VFR BLD AUTO: 42.9 % (ref 35–45)
HDLC SERPL-MCNC: 49 MG/DL
HGB BLD-MCNC: 14.6 G/DL (ref 11.7–15.5)
LDLC SERPL CALC-MCNC: 85 MG/DL (CALC)
LYMPHOCYTES # BLD AUTO: 2295 CELLS/UL (ref 850–3900)
LYMPHOCYTES NFR BLD AUTO: 38.9 %
MCH RBC QN AUTO: 33.3 PG (ref 27–33)
MCHC RBC AUTO-ENTMCNC: 34 G/DL (ref 32–36)
MCV RBC AUTO: 97.7 FL (ref 80–100)
MONOCYTES # BLD AUTO: 566 CELLS/UL (ref 200–950)
MONOCYTES NFR BLD AUTO: 9.6 %
NEUTROPHILS # BLD AUTO: 2744 CELLS/UL (ref 1500–7800)
NEUTROPHILS NFR BLD AUTO: 46.5 %
NONHDLC SERPL-MCNC: 111 MG/DL (CALC)
PLATELET # BLD AUTO: 321 THOUSAND/UL (ref 140–400)
PMV BLD REES-ECKER: 9.8 FL (ref 7.5–12.5)
POTASSIUM SERPL-SCNC: 4.9 MMOL/L (ref 3.5–5.3)
PROT SERPL-MCNC: 6.5 G/DL (ref 6.1–8.1)
RBC # BLD AUTO: 4.39 MILLION/UL (ref 3.8–5.1)
SL AMB EGFR AFRICAN AMERICAN: 100 ML/MIN/1.73M2
SL AMB EGFR NON AFRICAN AMERICAN: 86 ML/MIN/1.73M2
SODIUM SERPL-SCNC: 139 MMOL/L (ref 135–146)
TRIGL SERPL-MCNC: 159 MG/DL
WBC # BLD AUTO: 5.9 THOUSAND/UL (ref 3.8–10.8)

## 2021-07-15 ENCOUNTER — VBI (OUTPATIENT)
Dept: ADMINISTRATIVE | Facility: OTHER | Age: 76
End: 2021-07-15

## 2021-10-22 ENCOUNTER — VBI (OUTPATIENT)
Dept: ADMINISTRATIVE | Facility: OTHER | Age: 76
End: 2021-10-22

## 2022-02-03 ENCOUNTER — RA CDI HCC (OUTPATIENT)
Dept: OTHER | Facility: HOSPITAL | Age: 77
End: 2022-02-03

## 2022-02-03 NOTE — PROGRESS NOTES
[facility] 77  coding opportunities       Chart reviewed, no opportunity found: CHART REVIEWED, NO OPPORTUNITY FOUND                        Patients insurance company: [facility] [address]  (Medicare Advantage and Commercial)

## 2022-02-09 ENCOUNTER — OFFICE VISIT (OUTPATIENT)
Dept: FAMILY MEDICINE CLINIC | Facility: CLINIC | Age: 77
End: 2022-02-09
Payer: COMMERCIAL

## 2022-02-09 VITALS
WEIGHT: 167 LBS | DIASTOLIC BLOOD PRESSURE: 76 MMHG | HEIGHT: 68 IN | RESPIRATION RATE: 16 BRPM | HEART RATE: 90 BPM | SYSTOLIC BLOOD PRESSURE: 124 MMHG | TEMPERATURE: 98.4 F | OXYGEN SATURATION: 97 % | BODY MASS INDEX: 25.31 KG/M2

## 2022-02-09 DIAGNOSIS — Z13.820 OSTEOPOROSIS SCREENING: ICD-10-CM

## 2022-02-09 DIAGNOSIS — Z00.00 MEDICARE ANNUAL WELLNESS VISIT, SUBSEQUENT: Primary | ICD-10-CM

## 2022-02-09 DIAGNOSIS — Z23 FLU VACCINE NEED: ICD-10-CM

## 2022-02-09 DIAGNOSIS — Z12.31 VISIT FOR SCREENING MAMMOGRAM: ICD-10-CM

## 2022-02-09 DIAGNOSIS — E78.00 PURE HYPERCHOLESTEROLEMIA: ICD-10-CM

## 2022-02-09 DIAGNOSIS — K22.70 BARRETT'S ESOPHAGUS WITHOUT DYSPLASIA: ICD-10-CM

## 2022-02-09 PROCEDURE — 3288F FALL RISK ASSESSMENT DOCD: CPT | Performed by: FAMILY MEDICINE

## 2022-02-09 PROCEDURE — 1125F AMNT PAIN NOTED PAIN PRSNT: CPT | Performed by: FAMILY MEDICINE

## 2022-02-09 PROCEDURE — 3008F BODY MASS INDEX DOCD: CPT | Performed by: FAMILY MEDICINE

## 2022-02-09 PROCEDURE — G0008 ADMIN INFLUENZA VIRUS VAC: HCPCS

## 2022-02-09 PROCEDURE — 1170F FXNL STATUS ASSESSED: CPT | Performed by: FAMILY MEDICINE

## 2022-02-09 PROCEDURE — G0439 PPPS, SUBSEQ VISIT: HCPCS | Performed by: FAMILY MEDICINE

## 2022-02-09 PROCEDURE — 3725F SCREEN DEPRESSION PERFORMED: CPT | Performed by: FAMILY MEDICINE

## 2022-02-09 PROCEDURE — 1003F LEVEL OF ACTIVITY ASSESS: CPT | Performed by: FAMILY MEDICINE

## 2022-02-09 PROCEDURE — 1090F PRES/ABSN URINE INCON ASSESS: CPT | Performed by: FAMILY MEDICINE

## 2022-02-09 PROCEDURE — 1160F RVW MEDS BY RX/DR IN RCRD: CPT | Performed by: FAMILY MEDICINE

## 2022-02-09 PROCEDURE — 1101F PT FALLS ASSESS-DOCD LE1/YR: CPT | Performed by: FAMILY MEDICINE

## 2022-02-09 PROCEDURE — 1036F TOBACCO NON-USER: CPT | Performed by: FAMILY MEDICINE

## 2022-02-09 PROCEDURE — 90662 IIV NO PRSV INCREASED AG IM: CPT

## 2022-02-09 RX ORDER — ZOSTER VACCINE RECOMBINANT, ADJUVANTED 50 MCG/0.5
0.5 KIT INTRAMUSCULAR ONCE
Qty: 1 EACH | Refills: 1 | Status: SHIPPED | OUTPATIENT
Start: 2022-02-09 | End: 2022-02-09

## 2022-02-09 NOTE — PROGRESS NOTES
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     Stable  See GI Dr Yvon Kennedy for endoscopy every 3 years  protonix as directed             Other Visit Diagnoses     Medicare annual wellness visit, subsequent    -  Primary    Relevant Medications    Zoster Vac Recomb Adjuvanted (Shingrix) 50 MCG/0 5ML SUSR    Visit for screening mammogram        Relevant Orders    Mammo screening bilateral w 3d & cad    Pure hypercholesterolemia        Relevant Orders    Lipid Panel with Direct LDL reflex    Comprehensive metabolic panel    CBC and differential    Osteoporosis screening        Relevant Orders    DXA bone density spine hip and pelvis    Flu vaccine need        Relevant Orders    influenza vaccine, high-dose, PF 0 7 mL (FLUZONE HIGH-DOSE)        BMI Counseling: Body mass index is 25 14 kg/m²  The BMI is above normal  Nutrition recommendations include decreasing portion sizes and encouraging healthy choices of fruits and vegetables  Exercise recommendations include moderate physical activity 150 minutes/week  No pharmacotherapy was ordered  Rationale for BMI follow-up plan is due to patient being overweight or obese  Depression Screening and Follow-up Plan: Patient was screened for depression during today's encounter  They screened negative with a PHQ-2 score of 0  Preventive health issues were discussed with patient, and age appropriate screening tests were ordered as noted in patient's After Visit Summary  Personalized health advice and appropriate referrals for health education or preventive services given if needed, as noted in patient's After Visit Summary       History of Present Illness:     Patient presents for Medicare Annual Wellness visit    Patient Care Team:  Mami Junior MD as PCP - General (Family Medicine)  Mami Junior MD (Family Medicine)     Problem List:     Patient Active Problem List   Diagnosis    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      Past Medical and Surgical History:     Past Medical History:   Diagnosis Date    Arthritis     Endometriosis     GERD (gastroesophageal reflux disease)      Past Surgical History:   Procedure Laterality Date    BELPHAROPTOSIS REPAIR Bilateral     COLPOPEXY      laparoscopic    TONSILLECTOMY        Family History:     Family History   Problem Relation Age of Onset    Heart failure Mother     Dementia Mother     Atrial fibrillation Mother     Pancreatic cancer Father     Sleep apnea Brother     Atrial fibrillation Brother     Breast cancer Paternal Aunt       Social History:     Social History     Socioeconomic History    Marital status: /Civil Union     Spouse name: None    Number of children: None    Years of education: None    Highest education level: None   Occupational History    None   Tobacco Use    Smoking status: Former Smoker    Smokeless tobacco: Never Used   Substance and Sexual Activity    Alcohol use: Yes     Alcohol/week: 5 0 standard drinks     Types: 5 Glasses of wine per week    Drug use: No    Sexual activity: Never   Other Topics Concern    None   Social History Narrative    None     Social Determinants of Health     Financial Resource Strain: Not on file   Food Insecurity: Not on file   Transportation Needs: Not on file   Physical Activity: Not on file   Stress: Not on file   Social Connections: Not on file   Intimate Partner Violence: Not on file   Housing Stability: Not on file      Medications and Allergies:     Current Outpatient Medications   Medication Sig Dispense Refill    Ascorbic Acid (vitamin C) 1000 MG tablet Take 1,000 mg by mouth daily Take one tablet  By mouth daily   atorvastatin (LIPITOR) 10 mg tablet take 1 tablet by mouth once daily 90 tablet 3    b complex vitamins tablet Take 1 tablet by mouth daily Take one tablet by mouth daily        Calcium-Magnesium 500-250 MG TABS Take by mouth Take one tablet by mouth daily   cholecalciferol (VITAMIN D3) 1,000 units tablet Take 1,000 Units by mouth daily      Omega-3 Fatty Acids (OMEGA 3 PO) Take by mouth Take one capsule by mouth daily   pantoprazole (PROTONIX) 40 mg tablet Take by mouth daily        Probiotic Product (PROBIOTIC PO) Take by mouth Take one tablet by mouth daily   Multiple Vitamins-Minerals (ZINC PO) Take by mouth Take one tablet by mouth daily  (Patient not taking: Reported on 2/9/2022 )      Zoster Vac Recomb Adjuvanted (Shingrix) 50 MCG/0 5ML SUSR Inject 0 5 mL into a muscle once for 1 dose Repeat dose in 2 to 6 months 1 each 1     No current facility-administered medications for this visit  Allergies   Allergen Reactions    Seasonal Ic [Cholestatin] Sneezing      Immunizations:     Immunization History   Administered Date(s) Administered    COVID-19 PFIZER VACCINE 0 3 ML IM 03/02/2021, 03/22/2021, 12/17/2021    INFLUENZA 10/18/2018, 12/15/2018    Pneumococcal Conjugate 13-Valent 02/05/2020    Pneumococcal Conjugate PCV 7 01/24/2015    Pneumococcal Polysaccharide PPV23 01/31/2019    Tdap 01/24/2014    Zoster 05/14/2014, 01/24/2015      Health Maintenance:         Topic Date Due    Breast Cancer Screening: Mammogram  Never done    Colorectal Cancer Screening  03/29/2028    Hepatitis C Screening  Completed         Topic Date Due    Influenza Vaccine (1) 09/01/2021      Medicare Health Risk Assessment:     /76 (BP Location: Left arm, Patient Position: Sitting, Cuff Size: Adult)   Pulse 90   Temp 98 4 °F (36 9 °C) (Tympanic)   Resp 16   Ht 5' 8 35" (1 736 m)   Wt 75 8 kg (167 lb)   SpO2 97%   BMI 25 14 kg/m²      Mark Stable is here for her Subsequent Wellness visit  Last Medicare Wellness visit information reviewed, patient interviewed, no change since last AWV  Health Risk Assessment:   Patient rates overall health as very good  Patient feels that their physical health rating is slightly better   Patient is very satisfied with their life  Eyesight was rated as same  Hearing was rated as slightly worse  Patient feels that their emotional and mental health rating is slightly better  Patients states they are sometimes angry  Patient states they are never, rarely unusually tired/fatigued  Pain experienced in the last 7 days has been none  Patient states that she has experienced no weight loss or gain in last 6 months  Depression Screening:   PHQ-2 Score: 0      Fall Risk Screening: In the past year, patient has experienced: no history of falling in past year      Urinary Incontinence Screening:   Patient has not leaked urine accidently in the last six months  Home Safety:  Patient does not have trouble with stairs inside or outside of their home  Patient has working smoke alarms and has working carbon monoxide detector  Home safety hazards include: none  Nutrition:   Current diet is Regular  Medications:   Patient is currently taking over-the-counter supplements  OTC medications include: see medication list  Patient is able to manage medications  Activities of Daily Living (ADLs)/Instrumental Activities of Daily Living (IADLs):   Walk and transfer into and out of bed and chair?: Yes  Dress and groom yourself?: Yes    Bathe or shower yourself?: Yes    Feed yourself?  Yes  Do your laundry/housekeeping?: Yes  Manage your money, pay your bills and track your expenses?: Yes  Make your own meals?: Yes    Do your own shopping?: Yes    Previous Hospitalizations:   Any hospitalizations or ED visits within the last 12 months?: Yes    How many hospitalizations have you had in the last year?: 1-2    Advance Care Planning:   Living will: No    Durable POA for healthcare: No    Advanced directive: No    Advanced directive counseling given: Yes      PREVENTIVE SCREENINGS      Cardiovascular Screening:    General: Screening Not Indicated and History Lipid Disorder      Diabetes Screening:     General: Screening Current Colorectal Cancer Screening:     General: Screening Current      Cervical Cancer Screening:    General: Screening Not Indicated      Lung Cancer Screening:     General: Screening Not Indicated      Hepatitis C Screening:    General: Screening Current    Screening, Brief Intervention, and Referral to Treatment (SBIRT)    Screening      Single Item Drug Screening:  How often have you used an illegal drug (including marijuana) or a prescription medication for non-medical reasons in the past year? never    Single Item Drug Screen Score: 0  Interpretation: Negative screen for possible drug use disorder      Toña Laird MD

## 2022-02-09 NOTE — PATIENT INSTRUCTIONS
Medicare Preventive Visit Patient Instructions  Thank you for completing your Welcome to Medicare Visit or Medicare Annual Wellness Visit today  Your next wellness visit will be due in one year (2/10/2023)  The screening/preventive services that you may require over the next 5-10 years are detailed below  Some tests may not apply to you based off risk factors and/or age  Screening tests ordered at today's visit but not completed yet may show as past due  Also, please note that scanned in results may not display below  Preventive Screenings:  Service Recommendations Previous Testing/Comments   Colorectal Cancer Screening  * Colonoscopy    * Fecal Occult Blood Test (FOBT)/Fecal Immunochemical Test (FIT)  * Fecal DNA/Cologuard Test  * Flexible Sigmoidoscopy Age: 54-65 years old   Colonoscopy: every 10 years (may be performed more frequently if at higher risk)  OR  FOBT/FIT: every 1 year  OR  Cologuard: every 3 years  OR  Sigmoidoscopy: every 5 years  Screening may be recommended earlier than age 48 if at higher risk for colorectal cancer  Also, an individualized decision between you and your healthcare provider will decide whether screening between the ages of 74-80 would be appropriate  Colonoscopy: 03/29/2018  FOBT/FIT: Not on file  Cologuard: Not on file  Sigmoidoscopy: Not on file    Screening Current     Breast Cancer Screening Age: 36 years old  Frequency: every 1-2 years  Not required if history of left and right mastectomy Mammogram: Not on file        Cervical Cancer Screening Between the ages of 21-29, pap smear recommended once every 3 years  Between the ages of 33-67, can perform pap smear with HPV co-testing every 5 years     Recommendations may differ for women with a history of total hysterectomy, cervical cancer, or abnormal pap smears in past  Pap Smear: Not on file    Screening Not Indicated   Hepatitis C Screening Once for adults born between 1945 and 1965  More frequently in patients at high risk for Hepatitis C Hep C Antibody: 02/19/2019    Screening Current   Diabetes Screening 1-2 times per year if you're at risk for diabetes or have pre-diabetes Fasting glucose: No results in last 5 years   A1C: No results in last 5 years    Screening Current   Cholesterol Screening Once every 5 years if you don't have a lipid disorder  May order more often based on risk factors  Lipid panel: 04/19/2021    Screening Not Indicated  History Lipid Disorder     Other Preventive Screenings Covered by Medicare:  1  Abdominal Aortic Aneurysm (AAA) Screening: covered once if your at risk  You're considered to be at risk if you have a family history of AAA  2  Lung Cancer Screening: covers low dose CT scan once per year if you meet all of the following conditions: (1) Age 50-69; (2) No signs or symptoms of lung cancer; (3) Current smoker or have quit smoking within the last 15 years; (4) You have a tobacco smoking history of at least 30 pack years (packs per day multiplied by number of years you smoked); (5) You get a written order from a healthcare provider  3  Glaucoma Screening: covered annually if you're considered high risk: (1) You have diabetes OR (2) Family history of glaucoma OR (3)  aged 48 and older OR (3)  American aged 72 and older  3  Osteoporosis Screening: covered every 2 years if you meet one of the following conditions: (1) You're estrogen deficient and at risk for osteoporosis based off medical history and other findings; (2) Have a vertebral abnormality; (3) On glucocorticoid therapy for more than 3 months; (4) Have primary hyperparathyroidism; (5) On osteoporosis medications and need to assess response to drug therapy  · Last bone density test (DXA Scan): Not on file  5  HIV Screening: covered annually if you're between the age of 12-76  Also covered annually if you are younger than 13 and older than 72 with risk factors for HIV infection   For pregnant patients, it is covered up to 3 times per pregnancy  Immunizations:  Immunization Recommendations   Influenza Vaccine Annual influenza vaccination during flu season is recommended for all persons aged >= 6 months who do not have contraindications   Pneumococcal Vaccine (Prevnar and Pneumovax)  * Prevnar = PCV13  * Pneumovax = PPSV23   Adults 25-60 years old: 1-3 doses may be recommended based on certain risk factors  Adults 72 years old: Prevnar (PCV13) vaccine recommended followed by Pneumovax (PPSV23) vaccine  If already received PPSV23 since turning 65, then PCV13 recommended at least one year after PPSV23 dose  Hepatitis B Vaccine 3 dose series if at intermediate or high risk (ex: diabetes, end stage renal disease, liver disease)   Tetanus (Td) Vaccine - COST NOT COVERED BY MEDICARE PART B Following completion of primary series, a booster dose should be given every 10 years to maintain immunity against tetanus  Td may also be given as tetanus wound prophylaxis  Tdap Vaccine - COST NOT COVERED BY MEDICARE PART B Recommended at least once for all adults  For pregnant patients, recommended with each pregnancy  Shingles Vaccine (Shingrix) - COST NOT COVERED BY MEDICARE PART B  2 shot series recommended in those aged 48 and above     Health Maintenance Due:      Topic Date Due    Breast Cancer Screening: Mammogram  Never done    Colorectal Cancer Screening  03/29/2028    Hepatitis C Screening  Completed     Immunizations Due:      Topic Date Due    COVID-19 Vaccine (1) Never done    Influenza Vaccine (1) 09/01/2021     Advance Directives   What are advance directives? Advance directives are legal documents that state your wishes and plans for medical care  These plans are made ahead of time in case you lose your ability to make decisions for yourself  Advance directives can apply to any medical decision, such as the treatments you want, and if you want to donate organs  What are the types of advance directives?   There are many types of advance directives, and each state has rules about how to use them  You may choose a combination of any of the following:  · Living will: This is a written record of the treatment you want  You can also choose which treatments you do not want, which to limit, and which to stop at a certain time  This includes surgery, medicine, IV fluid, and tube feedings  · Durable power of  for healthcare [facility]): This is a written record that states who you want to make healthcare choices for you when you are unable to make them for yourself  This person, called a proxy, is usually a family member or a friend  You may choose more than 1 proxy  · Do not resuscitate (DNR) order:  A DNR order is used in case your heart stops beating or you stop breathing  It is a request not to have certain forms of treatment, such as CPR  A DNR order may be included in other types of advance directives  · Medical directive: This covers the care that you want if you are in a coma, near death, or unable to make decisions for yourself  You can list the treatments you want for each condition  Treatment may include pain medicine, surgery, blood transfusions, dialysis, IV or tube feedings, and a ventilator (breathing machine)  · Values history: This document has questions about your views, beliefs, and how you feel and think about life  This information can help others choose the care that you would choose  Why are advance directives important? An advance directive helps you control your care  Although spoken wishes may be used, it is better to have your wishes written down  Spoken wishes can be misunderstood, or not followed  Treatments may be given even if you do not want them  An advance directive may make it easier for your family to make difficult choices about your care     Weight Management   Why it is important to manage your weight:  Being overweight increases your risk of health conditions such as heart disease, high blood pressure, type 2 diabetes, and certain types of cancer  It can also increase your risk for osteoarthritis, sleep apnea, and other respiratory problems  Aim for a slow, steady weight loss  Even a small amount of weight loss can lower your risk of health problems  How to lose weight safely:  A safe and healthy way to lose weight is to eat fewer calories and get regular exercise  You can lose up about 1 pound a week by decreasing the number of calories you eat by 500 calories each day  Healthy meal plan for weight management:  A healthy meal plan includes a variety of foods, contains fewer calories, and helps you stay healthy  A healthy meal plan includes the following:  · Eat whole-grain foods more often  A healthy meal plan should contain fiber  Fiber is the part of grains, fruits, and vegetables that is not broken down by your body  Whole-grain foods are healthy and provide extra fiber in your diet  Some examples of whole-grain foods are whole-wheat breads and pastas, oatmeal, brown rice, and bulgur  · Eat a variety of vegetables every day  Include dark, leafy greens such as spinach, kale, symone greens, and mustard greens  Eat yellow and orange vegetables such as carrots, sweet potatoes, and winter squash  · Eat a variety of fruits every day  Choose fresh or canned fruit (canned in its own juice or light syrup) instead of juice  Fruit juice has very little or no fiber  · Eat low-fat dairy foods  Drink fat-free (skim) milk or 1% milk  Eat fat-free yogurt and low-fat cottage cheese  Try low-fat cheeses such as mozzarella and other reduced-fat cheeses  · Choose meat and other protein foods that are low in fat  Choose beans or other legumes such as split peas or lentils  Choose fish, skinless poultry (chicken or turkey), or lean cuts of red meat (beef or pork)  Before you cook meat or poultry, cut off any visible fat  · Use less fat and oil  Try baking foods instead of frying them   Add less fat, such as margarine, sour cream, regular salad dressing and mayonnaise to foods  Eat fewer high-fat foods  Some examples of high-fat foods include french fries, doughnuts, ice cream, and cakes  · Eat fewer sweets  Limit foods and drinks that are high in sugar  This includes candy, cookies, regular soda, and sweetened drinks  Exercise:  Exercise at least 30 minutes per day on most days of the week  Some examples of exercise include walking, biking, dancing, and swimming  You can also fit in more physical activity by taking the stairs instead of the elevator or parking farther away from stores  Ask your healthcare provider about the best exercise plan for you  © Copyright [employer] 2018 Information is for End User's use only and may not be sold, redistributed or otherwise used for commercial purposes   All illustrations and images included in CareNotes® are the copyrighted property of [employer]  or [address] [place] [address]

## 2022-04-01 DIAGNOSIS — E78.00 PURE HYPERCHOLESTEROLEMIA: ICD-10-CM

## 2022-04-01 RX ORDER — ATORVASTATIN CALCIUM 10 MG/1
TABLET, FILM COATED ORAL
Qty: 90 TABLET | Refills: 3 | Status: SHIPPED | OUTPATIENT
Start: 2022-04-01

## 2022-04-13 LAB
ALBUMIN SERPL-MCNC: 4.2 G/DL (ref 3.6–5.1)
ALBUMIN/GLOB SERPL: 1.8 (CALC) (ref 1–2.5)
ALP SERPL-CCNC: 53 U/L (ref 37–153)
ALT SERPL-CCNC: 13 U/L (ref 6–29)
AST SERPL-CCNC: 15 U/L (ref 10–35)
BASOPHILS # BLD AUTO: 69 CELLS/UL (ref 0–200)
BASOPHILS NFR BLD AUTO: 1.1 %
BILIRUB SERPL-MCNC: 0.6 MG/DL (ref 0.2–1.2)
BUN SERPL-MCNC: 15 MG/DL (ref 7–25)
BUN/CREAT SERPL: NORMAL (CALC) (ref 6–22)
CALCIUM SERPL-MCNC: 9.5 MG/DL (ref 8.6–10.4)
CHLORIDE SERPL-SCNC: 101 MMOL/L (ref 98–110)
CHOLEST SERPL-MCNC: 175 MG/DL
CHOLEST/HDLC SERPL: 3.1 (CALC)
CO2 SERPL-SCNC: 30 MMOL/L (ref 20–32)
CREAT SERPL-MCNC: 0.8 MG/DL (ref 0.6–0.93)
EOSINOPHIL # BLD AUTO: 258 CELLS/UL (ref 15–500)
EOSINOPHIL NFR BLD AUTO: 4.1 %
ERYTHROCYTE [DISTWIDTH] IN BLOOD BY AUTOMATED COUNT: 11.7 % (ref 11–15)
GLOBULIN SER CALC-MCNC: 2.3 G/DL (CALC) (ref 1.9–3.7)
GLUCOSE SERPL-MCNC: 94 MG/DL (ref 65–99)
HCT VFR BLD AUTO: 40.9 % (ref 35–45)
HDLC SERPL-MCNC: 56 MG/DL
HGB BLD-MCNC: 13.5 G/DL (ref 11.7–15.5)
LDLC SERPL CALC-MCNC: 92 MG/DL (CALC)
LYMPHOCYTES # BLD AUTO: 2545 CELLS/UL (ref 850–3900)
LYMPHOCYTES NFR BLD AUTO: 40.4 %
MCH RBC QN AUTO: 32 PG (ref 27–33)
MCHC RBC AUTO-ENTMCNC: 33 G/DL (ref 32–36)
MCV RBC AUTO: 96.9 FL (ref 80–100)
MONOCYTES # BLD AUTO: 554 CELLS/UL (ref 200–950)
MONOCYTES NFR BLD AUTO: 8.8 %
NEUTROPHILS # BLD AUTO: 2873 CELLS/UL (ref 1500–7800)
NEUTROPHILS NFR BLD AUTO: 45.6 %
NONHDLC SERPL-MCNC: 119 MG/DL (CALC)
PLATELET # BLD AUTO: 305 THOUSAND/UL (ref 140–400)
PMV BLD REES-ECKER: 9.5 FL (ref 7.5–12.5)
POTASSIUM SERPL-SCNC: 4.1 MMOL/L (ref 3.5–5.3)
PROT SERPL-MCNC: 6.5 G/DL (ref 6.1–8.1)
RBC # BLD AUTO: 4.22 MILLION/UL (ref 3.8–5.1)
SL AMB EGFR AFRICAN AMERICAN: 84 ML/MIN/1.73M2
SL AMB EGFR NON AFRICAN AMERICAN: 73 ML/MIN/1.73M2
SODIUM SERPL-SCNC: 138 MMOL/L (ref 135–146)
TRIGL SERPL-MCNC: 176 MG/DL
WBC # BLD AUTO: 6.3 THOUSAND/UL (ref 3.8–10.8)

## 2022-04-18 ENCOUNTER — TELEPHONE (OUTPATIENT)
Dept: FAMILY MEDICINE CLINIC | Facility: CLINIC | Age: 77
End: 2022-04-18

## 2022-04-18 NOTE — TELEPHONE ENCOUNTER
Patient was given her blood work results  I don't know why but it won't let me put in her chart  Also mailed results

## 2022-10-22 RX ORDER — ASCORBIC ACID 500 MG
TABLET ORAL
COMMUNITY
End: 2022-11-09

## 2022-11-05 ENCOUNTER — HOSPITAL ENCOUNTER (INPATIENT)
Facility: HOSPITAL | Age: 77
LOS: 4 days | Discharge: HOME | End: 2022-11-09
Attending: EMERGENCY MEDICINE | Admitting: HOSPITALIST

## 2022-11-05 ENCOUNTER — APPOINTMENT (EMERGENCY)
Dept: CT IMAGING | Facility: HOSPITAL | Age: 77
End: 2022-11-05

## 2022-11-05 DIAGNOSIS — K62.5 RECTAL BLEEDING: ICD-10-CM

## 2022-11-05 DIAGNOSIS — K52.9 COLITIS: Primary | ICD-10-CM

## 2022-11-05 LAB
ALBUMIN SERPL BCP-MCNC: 4.5 G/DL (ref 3.5–5)
ALP SERPL-CCNC: 47 U/L (ref 34–104)
ALT SERPL W P-5'-P-CCNC: 23 U/L (ref 7–52)
ANION GAP SERPL CALCULATED.3IONS-SCNC: 10 MMOL/L (ref 4–13)
APTT PPP: 27 SECONDS (ref 23–37)
AST SERPL W P-5'-P-CCNC: 19 U/L (ref 13–39)
BACTERIA UR QL AUTO: ABNORMAL /HPF
BASOPHILS # BLD AUTO: 0.04 THOUSANDS/ÂΜL (ref 0–0.1)
BASOPHILS NFR BLD AUTO: 0 % (ref 0–1)
BILIRUB SERPL-MCNC: 1.1 MG/DL (ref 0.2–1)
BILIRUB UR QL STRIP: NEGATIVE
BUN SERPL-MCNC: 10 MG/DL (ref 5–25)
CALCIUM SERPL-MCNC: 9.1 MG/DL (ref 8.4–10.2)
CHLORIDE SERPL-SCNC: 102 MMOL/L (ref 96–108)
CLARITY UR: CLEAR
CO2 SERPL-SCNC: 24 MMOL/L (ref 21–32)
COLOR UR: COLORLESS
CREAT SERPL-MCNC: 0.66 MG/DL (ref 0.6–1.3)
EOSINOPHIL # BLD AUTO: 0.07 THOUSAND/ÂΜL (ref 0–0.61)
EOSINOPHIL NFR BLD AUTO: 1 % (ref 0–6)
ERYTHROCYTE [DISTWIDTH] IN BLOOD BY AUTOMATED COUNT: 12.4 % (ref 11.6–15.1)
GFR SERPL CREATININE-BSD FRML MDRD: 86 ML/MIN/1.73SQ M
GLUCOSE SERPL-MCNC: 129 MG/DL (ref 65–140)
GLUCOSE UR STRIP-MCNC: NEGATIVE MG/DL
HCT VFR BLD AUTO: 44.9 % (ref 34.8–46.1)
HGB BLD-MCNC: 15 G/DL (ref 11.5–15.4)
HGB UR QL STRIP.AUTO: NEGATIVE
IMM GRANULOCYTES # BLD AUTO: 0.06 THOUSAND/UL (ref 0–0.2)
IMM GRANULOCYTES NFR BLD AUTO: 0 % (ref 0–2)
INR PPP: 0.95 (ref 0.84–1.19)
KETONES UR STRIP-MCNC: ABNORMAL MG/DL
LACTATE SERPL-SCNC: 1.3 MMOL/L (ref 0.5–2)
LEUKOCYTE ESTERASE UR QL STRIP: ABNORMAL
LIPASE SERPL-CCNC: 16 U/L (ref 11–82)
LYMPHOCYTES # BLD AUTO: 1.49 THOUSANDS/ÂΜL (ref 0.6–4.47)
LYMPHOCYTES NFR BLD AUTO: 10 % (ref 14–44)
MCH RBC QN AUTO: 32.7 PG (ref 26.8–34.3)
MCHC RBC AUTO-ENTMCNC: 33.4 G/DL (ref 31.4–37.4)
MCV RBC AUTO: 98 FL (ref 82–98)
MONOCYTES # BLD AUTO: 1.15 THOUSAND/ÂΜL (ref 0.17–1.22)
MONOCYTES NFR BLD AUTO: 8 % (ref 4–12)
NEUTROPHILS # BLD AUTO: 11.94 THOUSANDS/ÂΜL (ref 1.85–7.62)
NEUTS SEG NFR BLD AUTO: 81 % (ref 43–75)
NITRITE UR QL STRIP: NEGATIVE
NON-SQ EPI CELLS URNS QL MICRO: ABNORMAL /HPF
NRBC BLD AUTO-RTO: 0 /100 WBCS
PH UR STRIP.AUTO: 6 [PH]
PLATELET # BLD AUTO: 298 THOUSANDS/UL (ref 149–390)
PMV BLD AUTO: 9.2 FL (ref 8.9–12.7)
POTASSIUM SERPL-SCNC: 3.8 MMOL/L (ref 3.5–5.3)
PROT SERPL-MCNC: 7.2 G/DL (ref 6.4–8.4)
PROT UR STRIP-MCNC: NEGATIVE MG/DL
PROTHROMBIN TIME: 12.9 SECONDS (ref 11.6–14.5)
RBC # BLD AUTO: 4.59 MILLION/UL (ref 3.81–5.12)
RBC #/AREA URNS AUTO: ABNORMAL /HPF
SODIUM SERPL-SCNC: 136 MMOL/L (ref 135–147)
SP GR UR STRIP.AUTO: 1.02 (ref 1–1.03)
UROBILINOGEN UR STRIP-ACNC: <2 MG/DL
WBC # BLD AUTO: 14.75 THOUSAND/UL (ref 4.31–10.16)
WBC #/AREA URNS AUTO: ABNORMAL /HPF

## 2022-11-05 RX ORDER — METRONIDAZOLE 500 MG/100ML
500 INJECTION, SOLUTION INTRAVENOUS EVERY 8 HOURS
Status: DISCONTINUED | OUTPATIENT
Start: 2022-11-05 | End: 2022-11-09 | Stop reason: HOSPADM

## 2022-11-05 RX ORDER — CEFTRIAXONE 1 G/50ML
1000 INJECTION, SOLUTION INTRAVENOUS ONCE
Status: COMPLETED | OUTPATIENT
Start: 2022-11-05 | End: 2022-11-06

## 2022-11-05 RX ADMIN — IOHEXOL 100 ML: 350 INJECTION, SOLUTION INTRAVENOUS at 22:00

## 2022-11-06 PROBLEM — K52.9 COLITIS: Status: ACTIVE | Noted: 2022-11-06

## 2022-11-06 PROBLEM — R93.89 ENDOMETRIAL THICKENING ON ULTRASOUND: Status: ACTIVE | Noted: 2022-11-06

## 2022-11-06 LAB
ALBUMIN SERPL BCP-MCNC: 3.8 G/DL (ref 3.5–5)
ALP SERPL-CCNC: 40 U/L (ref 34–104)
ALT SERPL W P-5'-P-CCNC: 18 U/L (ref 7–52)
ANION GAP SERPL CALCULATED.3IONS-SCNC: 8 MMOL/L (ref 4–13)
AST SERPL W P-5'-P-CCNC: 15 U/L (ref 13–39)
BASOPHILS # BLD AUTO: 0.06 THOUSANDS/ÂΜL (ref 0–0.1)
BASOPHILS NFR BLD AUTO: 1 % (ref 0–1)
BILIRUB SERPL-MCNC: 0.87 MG/DL (ref 0.2–1)
BUN SERPL-MCNC: 8 MG/DL (ref 5–25)
C DIFF TOX GENS STL QL NAA+PROBE: NEGATIVE
CALCIUM SERPL-MCNC: 8.6 MG/DL (ref 8.4–10.2)
CAMPYLOBACTER DNA SPEC NAA+PROBE: NORMAL
CHLORIDE SERPL-SCNC: 107 MMOL/L (ref 96–108)
CO2 SERPL-SCNC: 23 MMOL/L (ref 21–32)
CREAT SERPL-MCNC: 0.51 MG/DL (ref 0.6–1.3)
EOSINOPHIL # BLD AUTO: 0.24 THOUSAND/ÂΜL (ref 0–0.61)
EOSINOPHIL NFR BLD AUTO: 2 % (ref 0–6)
ERYTHROCYTE [DISTWIDTH] IN BLOOD BY AUTOMATED COUNT: 12.6 % (ref 11.6–15.1)
GFR SERPL CREATININE-BSD FRML MDRD: 94 ML/MIN/1.73SQ M
GLUCOSE SERPL-MCNC: 96 MG/DL (ref 65–140)
HCT VFR BLD AUTO: 40.9 % (ref 34.8–46.1)
HGB BLD-MCNC: 13.4 G/DL (ref 11.5–15.4)
IMM GRANULOCYTES # BLD AUTO: 0.08 THOUSAND/UL (ref 0–0.2)
IMM GRANULOCYTES NFR BLD AUTO: 1 % (ref 0–2)
LYMPHOCYTES # BLD AUTO: 2.08 THOUSANDS/ÂΜL (ref 0.6–4.47)
LYMPHOCYTES NFR BLD AUTO: 16 % (ref 14–44)
MCH RBC QN AUTO: 32.3 PG (ref 26.8–34.3)
MCHC RBC AUTO-ENTMCNC: 32.8 G/DL (ref 31.4–37.4)
MCV RBC AUTO: 99 FL (ref 82–98)
MONOCYTES # BLD AUTO: 1.18 THOUSAND/ÂΜL (ref 0.17–1.22)
MONOCYTES NFR BLD AUTO: 9 % (ref 4–12)
NEUTROPHILS # BLD AUTO: 9.39 THOUSANDS/ÂΜL (ref 1.85–7.62)
NEUTS SEG NFR BLD AUTO: 71 % (ref 43–75)
NRBC BLD AUTO-RTO: 0 /100 WBCS
PLATELET # BLD AUTO: 265 THOUSANDS/UL (ref 149–390)
PMV BLD AUTO: 9.2 FL (ref 8.9–12.7)
POTASSIUM SERPL-SCNC: 3.7 MMOL/L (ref 3.5–5.3)
PROT SERPL-MCNC: 6.3 G/DL (ref 6.4–8.4)
RBC # BLD AUTO: 4.15 MILLION/UL (ref 3.81–5.12)
SALMONELLA DNA SPEC QL NAA+PROBE: NORMAL
SHIGA TOXIN STX GENE SPEC NAA+PROBE: NORMAL
SHIGELLA DNA SPEC QL NAA+PROBE: NORMAL
SODIUM SERPL-SCNC: 138 MMOL/L (ref 135–147)
WBC # BLD AUTO: 13.03 THOUSAND/UL (ref 4.31–10.16)

## 2022-11-06 RX ORDER — ASPIRIN 81 MG/1
81 TABLET ORAL DAILY
COMMUNITY

## 2022-11-06 RX ORDER — SODIUM CHLORIDE 9 MG/ML
75 INJECTION, SOLUTION INTRAVENOUS CONTINUOUS
Status: DISCONTINUED | OUTPATIENT
Start: 2022-11-06 | End: 2022-11-08

## 2022-11-06 RX ORDER — ONDANSETRON 2 MG/ML
4 INJECTION INTRAMUSCULAR; INTRAVENOUS EVERY 6 HOURS PRN
Status: DISCONTINUED | OUTPATIENT
Start: 2022-11-06 | End: 2022-11-09 | Stop reason: HOSPADM

## 2022-11-06 RX ORDER — ACETAMINOPHEN 325 MG/1
650 TABLET ORAL EVERY 6 HOURS PRN
Status: DISCONTINUED | OUTPATIENT
Start: 2022-11-06 | End: 2022-11-09 | Stop reason: HOSPADM

## 2022-11-06 RX ORDER — CEFTRIAXONE 1 G/50ML
1000 INJECTION, SOLUTION INTRAVENOUS EVERY 24 HOURS
Status: DISCONTINUED | OUTPATIENT
Start: 2022-11-06 | End: 2022-11-09 | Stop reason: HOSPADM

## 2022-11-06 RX ORDER — PANTOPRAZOLE SODIUM 40 MG/1
40 TABLET, DELAYED RELEASE ORAL
Status: DISCONTINUED | OUTPATIENT
Start: 2022-11-06 | End: 2022-11-09 | Stop reason: HOSPADM

## 2022-11-06 RX ORDER — ATORVASTATIN CALCIUM 10 MG/1
10 TABLET, FILM COATED ORAL DAILY
Status: DISCONTINUED | OUTPATIENT
Start: 2022-11-06 | End: 2022-11-09 | Stop reason: HOSPADM

## 2022-11-06 RX ADMIN — ATORVASTATIN CALCIUM 10 MG: 10 TABLET, FILM COATED ORAL at 08:02

## 2022-11-06 RX ADMIN — METRONIDAZOLE 500 MG: 5 INJECTION, SOLUTION INTRAVENOUS at 15:04

## 2022-11-06 RX ADMIN — METRONIDAZOLE 500 MG: 5 INJECTION, SOLUTION INTRAVENOUS at 01:35

## 2022-11-06 RX ADMIN — CEFTRIAXONE 1000 MG: 1 INJECTION, SOLUTION INTRAVENOUS at 00:11

## 2022-11-06 RX ADMIN — CEFTRIAXONE 1000 MG: 1 INJECTION, SOLUTION INTRAVENOUS at 22:03

## 2022-11-06 RX ADMIN — SODIUM CHLORIDE 75 ML/HR: 0.9 INJECTION, SOLUTION INTRAVENOUS at 17:19

## 2022-11-06 RX ADMIN — PANTOPRAZOLE SODIUM 40 MG: 40 TABLET, DELAYED RELEASE ORAL at 05:16

## 2022-11-06 RX ADMIN — SODIUM CHLORIDE 75 ML/HR: 0.9 INJECTION, SOLUTION INTRAVENOUS at 01:35

## 2022-11-06 RX ADMIN — METRONIDAZOLE 500 MG: 5 INJECTION, SOLUTION INTRAVENOUS at 07:50

## 2022-11-06 RX ADMIN — ACETAMINOPHEN 650 MG: 325 TABLET ORAL at 15:40

## 2022-11-06 RX ADMIN — METRONIDAZOLE 500 MG: 5 INJECTION, SOLUTION INTRAVENOUS at 23:23

## 2022-11-06 NOTE — PLAN OF CARE
Problem: Potential for Falls  Goal: Patient will remain free of falls  Description: INTERVENTIONS:  - Educate patient/family on patient safety including physical limitations  - Instruct patient to call for assistance with activity   - Consult OT/PT to assist with strengthening/mobility   - Keep Call bell within reach  - Keep bed low and locked with side rails adjusted as appropriate  - Keep care items and personal belongings within reach  - Initiate and maintain comfort rounds  - Make Fall Risk Sign visible to staff  - Offer Toileting every 2 Hours, in advance of need  - Initiate/Maintain bed alarm  - Obtain necessary fall risk management equipment: fall cushion   - Apply yellow socks and bracelet for high fall risk patients  - Consider moving patient to room near nurses station  Outcome: Progressing     Problem: Nutrition/Hydration-ADULT  Goal: Nutrient/Hydration intake appropriate for improving, restoring or maintaining nutritional needs  Description: Monitor and assess patient's nutrition/hydration status for malnutrition  Collaborate with interdisciplinary team and initiate plan and interventions as ordered  Monitor patient's weight and dietary intake as ordered or per policy  Utilize nutrition screening tool and intervene as necessary  Determine patient's food preferences and provide high-protein, high-caloric foods as appropriate       INTERVENTIONS:  - Monitor oral intake, urinary output, labs, and treatment plans  - Assess nutrition and hydration status and recommend course of action  - Evaluate amount of meals eaten  - Assist patient with eating if necessary   - Allow adequate time for meals  - Recommend/ encourage appropriate diets, oral nutritional supplements, and vitamin/mineral supplements  - Order, calculate, and assess calorie counts as needed  - Recommend, monitor, and adjust tube feedings and TPN/PPN based on assessed needs  - Assess need for intravenous fluids  - Provide specific nutrition/hydration education as appropriate  - Include patient/family/caregiver in decisions related to nutrition  Outcome: Progressing     Problem: INFECTION - ADULT  Goal: Absence or prevention of progression during hospitalization  Description: INTERVENTIONS:  - Assess and monitor for signs and symptoms of infection  - Monitor lab/diagnostic results  - Monitor all insertion sites, i e  indwelling lines, tubes, and drains  - Monitor endotracheal if appropriate and nasal secretions for changes in amount and color  - [place] appropriate cooling/warming therapies per order  - Administer medications as ordered  - Instruct and encourage patient and family to use good hand hygiene technique  - Identify and instruct in appropriate isolation precautions for identified infection/condition  Outcome: Progressing

## 2022-11-06 NOTE — CONSULTS
Consultation - [facility]  Marci Giordano 78 y o  female MRN: [id number]  Unit/Bed#: S -01 Encounter: [id number]        Inpatient consult to gastroenterology  Consult performed by: Joanie Solis PA-C  Consult ordered by: Ian Malcolm PA-C          Reason for Consult / Principal Problem: Colitis    HPI: Tiburcio Coulter is a 78y o  year old female with history of Barretts esophagus who presented to the hospital last night with complaint of lower abdominal pain and diarrhea which started about 2 days ago, she said her diarrhea eventually became mixed with bright red blood which prompted her to come to the ER  She said she was visiting the [place] within the last few days and her pain has started while she was there, reported eating rich foods and seafood but no specifically undercooked or suspicious foods to her mind  CT scan here showed evidence of colitis involving the distal transverse colon in descending colon, white blood cell count was elevated at 14 75, presenting temperature was 99 9° and currently 98 1°  She is currently ordered for clear liquid diet, if stool tests are currently pending including C diff, Giardia, enteric panel, and calprotectin  At this time, patient says she is feeling little better than yesterday, still having some lower abdominal cramping intermittently which lasts for a few seconds, she says her rectal bleeding seems to be slowing down  She tells me her last colonoscopy was done at least 6 years ago with Dr Islas Dom otherwise keeps up-to-date with Armando's surveillance and had EGD last year, and takes PPI therapy daily  Denies any swallowing difficulties or unintentional weight loss recently  REVIEW OF SYSTEMS:    CONSTITUTIONAL: Denies any fever, chills, or rigors  Good appetite, and no recent weight loss  HEENT: No earache or tinnitus  Denies hearing loss or visual disturbances    CARDIOVASCULAR: No chest pain or palpitations  RESPIRATORY: Denies any cough, hemoptysis, shortness of breath or dyspnea on exertion  GASTROINTESTINAL: As noted in the History of Present Illness  GENITOURINARY: No problems with urination  Denies any hematuria or dysuria  NEUROLOGIC: No dizziness or vertigo, denies headaches  MUSCULOSKELETAL: Denies any muscle or joint pain  SKIN: Denies skin rashes or itching  ENDOCRINE: Denies excessive thirst  Denies intolerance to heat or cold  PSYCHOSOCIAL: Denies depression or anxiety  Denies any recent memory loss         Historical Information   Past Medical History:   Diagnosis Date   • Arthritis    • Endometriosis    • GERD (gastroesophageal reflux disease)      Past Surgical History:   Procedure Laterality Date   • BELPHAROPTOSIS REPAIR Bilateral    • COLPOPEXY      laparoscopic   • TONSILLECTOMY       Social History   Social History     Substance and Sexual Activity   Alcohol Use Yes   • Alcohol/week: 5 0 standard drinks   • Types: 5 Glasses of wine per week     Social History     Substance and Sexual Activity   Drug Use No     Social History     Tobacco Use   Smoking Status Former Smoker   • Years: 3 00   Smokeless Tobacco Never Used     Family History   Problem Relation Age of Onset   • Heart failure Mother    • Dementia Mother    • Atrial fibrillation Mother    • Pancreatic cancer Father    • Sleep apnea Brother    • Atrial fibrillation Brother    • Breast cancer Paternal Aunt        Meds/Allergies     Medications Prior to Admission   Medication   • aspirin (ECOTRIN LOW STRENGTH) 81 mg EC tablet   • atorvastatin (LIPITOR) 10 mg tablet   • B Complex Vitamins (VITAMIN B COMPLEX 100 IJ)   • Calcium-Magnesium 500-250 MG TABS   • cholecalciferol (VITAMIN D3) 1,000 units tablet   • Omega-3 Fatty Acids (OMEGA 3 PO)   • pantoprazole (PROTONIX) 40 mg tablet   • Probiotic Product (PROBIOTIC PO)   • Vitamin E 100 UNIT/GM CREA     Current Facility-Administered Medications   Medication Dose Route Frequency   • acetaminophen (TYLENOL) tablet 650 mg  650 mg Oral Q6H PRN   • atorvastatin (LIPITOR) tablet 10 mg  10 mg Oral Daily   • cefTRIAXone (ROCEPHIN) IVPB (premix in dextrose) 1,000 mg 50 mL  1,000 mg Intravenous Q24H   • influenza vaccine, high-dose (FLUZONE HIGH-DOSE) IM injection NELIDA 0 7 mL  0 7 mL Intramuscular Once   • metroNIDAZOLE (FLAGYL) IVPB (premix) 500 mg 100 mL  500 mg Intravenous Q8H   • ondansetron (ZOFRAN) injection 4 mg  4 mg Intravenous Q6H PRN   • pantoprazole (PROTONIX) EC tablet 40 mg  40 mg Oral Early Morning   • sodium chloride 0 9 % infusion  75 mL/hr Intravenous Continuous       Allergies   Allergen Reactions   • Seasonal Ic [Cholestatin] Sneezing           Objective     Blood pressure 117/71, pulse 82, temperature 98 1 °F (36 7 °C), resp  rate 16, height 5' 9" (1 753 m), weight 74 8 kg (165 lb), SpO2 95 %  Intake/Output Summary (Last 24 hours) at 11/6/2022 0845  Last data filed at 11/6/2022 0802  Gross per 24 hour   Intake 630 ml   Output --   Net 630 ml         PHYSICAL EXAM     General Appearance:   Alert, cooperative, no distress, appears stated age    HEENT:   Normocephalic, atraumatic, anicteric      Neck:  Supple, symmetrical, trachea midline, no adenopathy;    thyroid: no enlargement/tenderness/nodules; no carotid  bruit or JVD    Lungs:   Clear to auscultation bilaterally; no rales, rhonchi or wheezing; respirations unlabored    Heart[de-identified]   S1 and S2 normal; regular rate and rhythm; no murmur, rub, or gallop     Abdomen:   Soft, non-tender, non-distended; normal bowel sounds; no masses, no organomegaly    Genitalia:   Deferred    Rectal:   Deferred    Extremities:  No cyanosis, clubbing or edema    Pulses:  2+ and symmetric all extremities    Skin:  Skin color, texture, turgor normal, no rashes or lesions    Lymph nodes:  No palpable cervical, axillary or inguinal lymphadenopathy        Lab Results:   Admission on 11/05/2022   Component Date Value   • WBC 11/05/2022 14 75 (A)   • RBC 11/05/2022 4 59    • Hemoglobin 11/05/2022 15 0    • Hematocrit 11/05/2022 44 9    • MCV 11/05/2022 98    • MCH 11/05/2022 32 7    • MCHC 11/05/2022 33 4    • RDW 11/05/2022 12 4    • MPV 11/05/2022 9 2    • Platelets 72/50/8021 298    • nRBC 11/05/2022 0    • Neutrophils Relative 11/05/2022 81 (A)   • Immat GRANS % 11/05/2022 0    • Lymphocytes Relative 11/05/2022 10 (A)   • Monocytes Relative 11/05/2022 8    • Eosinophils Relative 11/05/2022 1    • Basophils Relative 11/05/2022 0    • Neutrophils Absolute 11/05/2022 11 94 (A)   • Immature Grans Absolute 11/05/2022 0 06    • Lymphocytes Absolute 11/05/2022 1 49    • Monocytes Absolute 11/05/2022 1 15    • Eosinophils Absolute 11/05/2022 0 07    • Basophils Absolute 11/05/2022 0 04    • Sodium 11/05/2022 136    • Potassium 11/05/2022 3 8    • Chloride 11/05/2022 102    • CO2 11/05/2022 24    • ANION GAP 11/05/2022 10    • BUN 11/05/2022 10    • Creatinine 11/05/2022 0 66    • Glucose 11/05/2022 129    • Calcium 11/05/2022 9 1    • AST 11/05/2022 19    • ALT 11/05/2022 23    • Alkaline Phosphatase 11/05/2022 47    • Total Protein 11/05/2022 7 2    • Albumin 11/05/2022 4 5    • Total Bilirubin 11/05/2022 1 10 (A)   • eGFR 11/05/2022 86    • Lipase 11/05/2022 16    • LACTIC ACID 11/05/2022 1 3    • PTT 11/05/2022 27    • Protime 11/05/2022 12 9    • INR 11/05/2022 0 95    • Color, UA 11/05/2022 Colorless    • Clarity, UA 11/05/2022 Clear    • Specific [place], UA 11/05/2022 1 016    • pH, UA 11/05/2022 6 0    • Leukocytes, UA 11/05/2022 Large (A)   • Nitrite, UA 11/05/2022 Negative    • Protein, UA 11/05/2022 Negative    • Glucose, UA 11/05/2022 Negative    • Ketones, UA 11/05/2022 Trace (A)   • Urobilinogen, UA 11/05/2022 <2 0    • Bilirubin, UA 11/05/2022 Negative    • Occult Blood, UA 11/05/2022 Negative    • RBC, UA 11/05/2022 1-2    • WBC, UA 11/05/2022 30-50 (A)   • Epithelial Cells 11/05/2022 Occasional    • Bacteria, UA 11/05/2022 None Seen    • Blood Culture 11/05/2022 Received in Microbiology Lab  Culture in Progress  • Blood Culture 11/05/2022 Received in Microbiology Lab  Culture in Progress  Imaging Studies: I have personally reviewed pertinent reports  CT ABDOMEN AND PELVIS WITH IV CONTRAST     INDICATION:   LLQ abdominal pain      COMPARISON:  None      TECHNIQUE:  CT examination of the abdomen and pelvis was performed  Axial, sagittal, and coronal 2D reformatted images were created from the source data and submitted for interpretation      Radiation dose length product (DLP) for this visit:  511 mGy-cm   This examination, like all CT scans performed in the [facility], was performed utilizing techniques to minimize radiation dose exposure, including the use of iterative   reconstruction and automated exposure control      IV Contrast:  100 mL of iohexol (OMNIPAQUE)  Enteric Contrast:  Enteric contrast was not administered      FINDINGS:     ABDOMEN     LOWER CHEST:  No clinically significant abnormality identified in the visualized lower chest      LIVER/BILIARY TREE:  Unremarkable      GALLBLADDER:  No calcified gallstones  No pericholecystic inflammatory change      SPLEEN:  Unremarkable      PANCREAS:  Unremarkable      ADRENAL GLANDS:  Unremarkable      KIDNEYS/URETERS:  Unremarkable  No hydronephrosis      STOMACH AND BOWEL:  Small hiatal hernia  Severe diffuse bowel wall thickening of the distal transverse and descending colon with moderate surrounding fat stranding  There are colonic diverticula  No bowel obstruction      APPENDIX:  No findings to suggest appendicitis      ABDOMINOPELVIC CAVITY:  Trace pelvic free fluid  No pneumoperitoneum  No lymphadenopathy      VESSELS:  Unremarkable for patient's age      PELVIS     REPRODUCTIVE ORGANS:  Calcified mass within the uterus likely due to fibroid    There is suggestion of endometrial thickening      URINARY BLADDER:  Unremarkable      ABDOMINAL WALL/INGUINAL REGIONS:  Unremarkable      OSSEOUS STRUCTURES:  No acute fracture or destructive osseous lesion      IMPRESSION:     1  Severe diffuse wall thickening of the distal transverse and descending colon surrounding fat stranding most compatible with nonspecific colitis less likely diverticulitis given diffuse inflammatory change  2   Suggestion of postmenopausal endometrial thickening, correlate with nonemergent outpatient pelvic ultrasound  ASSESSMENT/PLAN:     1  Acute onset of lower abdominal cramping and rectal bleeding with CT scan findings of left-sided colitis, clinically appears to be infectious; viral versus bacterial, possibly with superimposed ischemic colitis  Appears to be clinically improving with bowel rest and antibiotics at this time    -follow-up on stool infectious studies that her still pending    -continue clear liquid diet for now, gradually advance to low residue diet as patient clinically improves    -would recommend colonoscopy in the next few months as outpatient to follow-up, ensure mucosal healing and rule out underlying adenomatous lesions    -reasonable to continue antibiotics for now    The patient was seen and examined by Dr Gwendalyn Rinne, all deal medical decisions were made with Dr Gwendalyn Rinne  Thank you for allowing us to participate in the care of this pleasant patient  We will follow up with you closely

## 2022-11-06 NOTE — H&P
[facility]  H&P- Leslye Karis 1945, 78 y o  female MRN: [id number]  Unit/Bed#: ED-38 Encounter: [id number]  Primary Care Provider: Camron Marques MD   Date and time admitted to hospital: 11/5/2022  8:58 PM    * Colitis  Assessment & Plan  Patient presents with generalized lower abdominal pain that started 2 days ago with diarrhea and BRBPR  Yesterday, she states she had increased rectal bleeding which prompted her to come to the ER  States she was at the [employer]" over the past few days  · CT A/P shows severe diffuse wall thickening of the distal transverse and descending colon with surrounding fat stranding most compatible with nonspecific colitis; less likely diverticulitis   · With leukocytosis 14 75, does not meet SIRS criteria   · Check for infectious causes of colitis with stool culture and C diff   · Ordered fecal calprotectin   · Empiric abx started in the ED- will continue Ceftriaxone + Flagyl   · IVF   · Clear liquid diet as tolerated   · Hgb currently stable at 15, Monitor H+H and hemodynamics  · GI consult appreciated     Increased endometrial stripe thickness  Assessment & Plan  · Postmenopausal endometrial thickening noted on CT   · Outpatient pelvic ultrasound     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  Assessment & Plan  · Stable   · Continue protonix     VTE Pharmacologic Prophylaxis: VTE Score: 3 Moderate Risk (Score 3-4) - Pharmacological DVT Prophylaxis Contraindicated  Sequential Compression Devices Ordered  Code Status: Level 1 - Full Code   Discussion with family: Attempted to update  () via phone  Unable to contact  Anticipated Length of Stay: Patient will be admitted on an inpatient basis with an anticipated length of stay of greater than 2 midnights secondary to colitis       Total Time for Visit, including Counseling / Coordination of Care: 45 minutes Greater than 50% of this total time spent on direct patient counseling and coordination of care  Chief Complaint: lower abdominal pain and BRBPR     History of Present Illness:  Devendra Ritchie is a 78 y o  female with a PMH of Armando's esophagus who presents with lower abdominal pain that started 2 days ago  She reports she was having diarrhea which eventually turned into BRBPR  She states that the rectal bleeding increased which prompted her to come to the ER  She denies any recent sick contacts or antibiotic use  She states she was visiting the [place] over the past few days and that her pain started while she was there  She denies eating any undercooked meats however states that [place]  She denies any fever/chills, nausea/vomiting  No family hx of IBD  Review of Systems:  Review of Systems   Constitutional: Positive for appetite change  Negative for chills and fever  Respiratory: Negative for cough and shortness of breath  Cardiovascular: Negative for chest pain  Gastrointestinal: Positive for abdominal pain, blood in stool and diarrhea  Negative for constipation, nausea and vomiting  Genitourinary: Negative for dysuria and hematuria  Musculoskeletal: Negative for back pain and myalgias  Skin: Negative for rash  Neurological: Negative for weakness and headaches  Hematological: Negative for adenopathy  Psychiatric/Behavioral: Negative for confusion  Past Medical and Surgical History:   Past Medical History:   Diagnosis Date   • Arthritis    • Endometriosis    • GERD (gastroesophageal reflux disease)        Past Surgical History:   Procedure Laterality Date   • BELPHAROPTOSIS REPAIR Bilateral    • COLPOPEXY      laparoscopic   • TONSILLECTOMY         Meds/Allergies:  Prior to Admission medications    Medication Sig Start Date End Date Taking?  Authorizing Provider   aspirin (ECOTRIN LOW STRENGTH) 81 mg EC tablet Take 81 mg by mouth daily   Yes Historical Provider, MD atorvastatin (LIPITOR) 10 mg tablet TAKE 1 TABLET BY MOUTH EVERY DAY 4/1/22  Yes Nicole Davenport MD   B Complex Vitamins (VITAMIN B COMPLEX 100 IJ)    Yes Historical Provider, MD   Calcium-Magnesium 500-250 MG TABS Take by mouth Take one tablet by mouth daily  Yes Historical Provider, MD   cholecalciferol (VITAMIN D3) 1,000 units tablet Take 1,000 Units by mouth daily   Yes Historical Provider, MD   Omega-3 Fatty Acids (OMEGA 3 PO) Take by mouth Patient takes 2 tablets daily   Yes Historical Provider, MD   pantoprazole (PROTONIX) 40 mg tablet Take by mouth daily     Yes Historical Provider, MD   Probiotic Product (PROBIOTIC PO) Take by mouth Take one tablet by mouth daily  Yes Historical Provider, MD   Vitamin E 100 UNIT/GM CREA     Historical Provider, MD   Ascorbic Acid (vitamin C) 1000 MG tablet Take 1,000 mg by mouth daily Take one tablet  By mouth daily  Patient not taking: Reported on 11/6/2022 11/6/22  Historical Provider, MD   b complex vitamins tablet Take 1 tablet by mouth daily Take one tablet by mouth daily  11/6/22  Historical Provider, MD   Multiple Vitamins-Minerals (ZINC PO) Take by mouth Take one tablet by mouth daily  Patient not taking: Reported on 2/9/2022 11/6/22  Historical Provider, MD   Omega-3 Fatty Acids (OMEGA 3 500 PO) 2 tablets daily  11/6/22  Historical Provider, MD     I have reviewed home medications with patient personally  Allergies:    Allergies   Allergen Reactions   • Seasonal Ic [Cholestatin] Sneezing       Social History:  Marital Status: /Civil Union   Patient Pre-hospital Living Situation: Home  Patient Pre-hospital Level of Mobility: walks  Patient Pre-hospital Diet Restrictions: none   Substance Use History:   Social History     Substance and Sexual Activity   Alcohol Use Yes   • Alcohol/week: 5 0 standard drinks   • Types: 5 Glasses of wine per week     Social History     Tobacco Use   Smoking Status Former Smoker   Smokeless Tobacco Never Used Social History     Substance and Sexual Activity   Drug Use No       Family History:  Family History   Problem Relation Age of Onset   • Heart failure Mother    • Dementia Mother    • Atrial fibrillation Mother    • Pancreatic cancer Father    • Sleep apnea Brother    • Atrial fibrillation Brother    • Breast cancer Paternal Aunt        Physical Exam:     Vitals:   Blood Pressure: 158/87 (11/06/22 0000)  Pulse: 80 (11/06/22 0000)  Temperature: 99 9 °F (37 7 °C) (11/05/22 1930)  Temp Source: Oral (11/05/22 1930)  Respirations: 16 (11/06/22 0000)  Height: 5' 9" (175 3 cm) (11/05/22 1930)  Weight - Scale: 74 8 kg (165 lb) (11/05/22 1930)  SpO2: 96 % (11/06/22 0000)    Physical Exam  Constitutional:       General: She is not in acute distress  Appearance: She is not toxic-appearing  HENT:      Mouth/Throat:      Mouth: Mucous membranes are moist    Eyes:      General: No scleral icterus  Cardiovascular:      Rate and Rhythm: Normal rate and regular rhythm  Heart sounds: Normal heart sounds  Pulmonary:      Effort: No respiratory distress  Breath sounds: Normal breath sounds  Abdominal:      General: Abdomen is flat  There is no distension  Palpations: Abdomen is soft  Tenderness: There is abdominal tenderness  There is no guarding  Hernia: No hernia is present  Comments: Generalized lower abdominal TTP    Musculoskeletal:      Right lower leg: No edema  Left lower leg: No edema  Skin:     General: Skin is warm  Neurological:      Mental Status: She is alert and oriented to person, place, and time     Psychiatric:         Mood and Affect: Mood normal           Additional Data:     Lab Results:  Results from last 7 days   Lab Units 11/05/22 1930   WBC Thousand/uL 14 75*   HEMOGLOBIN g/dL 15 0   HEMATOCRIT % 44 9   PLATELETS Thousands/uL 298   NEUTROS PCT % 81*   LYMPHS PCT % 10*   MONOS PCT % 8   EOS PCT % 1     Results from last 7 days   Lab Units 11/05/22 1930 SODIUM mmol/L 136   POTASSIUM mmol/L 3 8   CHLORIDE mmol/L 102   CO2 mmol/L 24   BUN mg/dL 10   CREATININE mg/dL 0 66   ANION GAP mmol/L 10   CALCIUM mg/dL 9 1   ALBUMIN g/dL 4 5   TOTAL BILIRUBIN mg/dL 1 10*   ALK PHOS U/L 47   ALT U/L 23   AST U/L 19   GLUCOSE RANDOM mg/dL 129     Results from last 7 days   Lab Units 11/05/22  1930   INR  0 95             Results from last 7 days   Lab Units 11/05/22 1930   LACTIC ACID mmol/L 1 3       Imaging: Reviewed radiology reports from this admission including: abdominal/pelvic CT  CT abdomen pelvis with contrast   Final Result by Arpit Israel MD (11/05 2335)      1  Severe diffuse wall thickening of the distal transverse and descending colon surrounding fat stranding most compatible with nonspecific colitis less likely diverticulitis given diffuse inflammatory change  2   Suggestion of postmenopausal endometrial thickening, correlate with nonemergent outpatient pelvic ultrasound  The study was marked in [facility] for immediate notification  Workstation performed: [id number]             EKG and Other Studies Reviewed on Admission:   · EKG: No EKG obtained  ** Please Note: This note has been constructed using a voice recognition system   **

## 2022-11-06 NOTE — UTILIZATION REVIEW
Initial Clinical Review    Admission: Date/Time/Statement:   Admission Orders (From admission, onward)     Ordered        11/05/22 2345  1 [facility] [place] [place]  Once                      Orders Placed This Encounter   Procedures   • INPATIENT ADMISSION     Standing Status:   Standing     Number of Occurrences:   1     Order Specific Question:   Level of Care     Answer:   Med Surg [16]     Order Specific Question:   Estimated length of stay     Answer:   More than 2 Midnights     Order Specific Question:   Certification     Answer:   I certify that inpatient services are medically necessary for this patient for a duration of greater than two midnights  See H&P and MD Progress Notes for additional information about the patient's course of treatment  ED Arrival Information     Expected   -    Arrival   11/5/2022 18:41    Acuity   Urgent            Means of arrival   Walk-In    Escorted by   Spouse    Service   Hospitalist    Admission type   Emergency            Arrival complaint   Flank Pain/Bloody Stool           Chief Complaint   Patient presents with   • Abdominal Pain     Patient reports LLQ abd pain starting yesterday evening - pain sharp, stabbing and intermittent  Additional c/o new onset blood in stool - reports small red bleeding last evening and now having intermittent uncontrolled rectal bleeding  Denies n/v/urinary symptoms  Initial Presentation: 78 y o  female with a PMH of Armando's esophagus who presents with lower abdominal pain that started 2 days ago  She reports she was having diarrhea which eventually turned into BRBPR  She states that the rectal bleeding increased which prompted her to come to the ER  Plan: Inpatient admission for evaluation and treatment of colitis: stool culture and C diff, fecal calprotectin, IV ceftriaxone, IV Flagyl, IV fluids, clear liquid diet, GI consult       Date: 11/6   Day 2:     GI consult: She is currently ordered for clear liquid diet, stool tests are currently pending including C diff, Giardia, enteric panel, and calprotectin  Acute onset of lower abdominal cramping and rectal bleeding with CT scan findings of left-sided colitis, clinically appears to be infectious; viral versus bacterial, possibly with superimposed ischemic colitis  Appears to be clinically improving with bowel rest and antibiotics at this time  Continue clear liquid diet for now, gradually advance to low residue diet as patient clinically improves  Reasonable to continue antibiotics for now  Internal medicine: Continue with clear liquid diet in the IV antibiotics  Patient states that the abdominal pain does come and go but it is pretty intense when it does head  Hopefully advancement in diet tomorrow  ED Triage Vitals [11/05/22 1930]   Temperature Pulse Respirations Blood Pressure SpO2   99 9 °F (37 7 °C) 82 16 146/75 97 %      Temp Source Heart Rate Source Patient Position - Orthostatic VS BP Location FiO2 (%)   Oral Monitor Sitting Right arm --      Pain Score       3          Wt Readings from Last 1 Encounters:   11/05/22 74 8 kg (165 lb)     Additional Vital Signs:     Date/Time Temp Pulse Resp BP MAP (mmHg) SpO2 O2 Device   11/06/22 0755 -- -- -- -- -- -- None (Room air)   11/06/22 07:37:39 98 1 °F (36 7 °C) 82 16 117/71 86 95 % --   11/06/22 0420 -- -- -- -- -- -- None (Room air)   11/06/22 04:17:25 98 4 °F (36 9 °C) 91 18 134/81 99 97 % None (Room air)   11/06/22 0400 -- 80 16 133/76 99 94 % None (Room air)   11/06/22 0007 -- -- -- -- -- -- None (Room air)   11/06/22 0000 -- 80 16 158/87 116 96 % None (Room air)   11/05/22 2300 -- 72 16 151/74 106 95 % None (Room air)   11/05/22 2233 -- -- -- -- -- -- None (Room air)   11/05/22 2215 -- 84 18 163/79 114 98 % None (Room air)   11/05/22 2115 -- 83 18 176/86 Abnormal  120 98 % None (Room air)       Pertinent Labs/Diagnostic Test Results:   CT abdomen pelvis with contrast   Final Result by Otoniel Pitts MD (11/05 1326)      1  Severe diffuse wall thickening of the distal transverse and descending colon surrounding fat stranding most compatible with nonspecific colitis less likely diverticulitis given diffuse inflammatory change  2   Suggestion of postmenopausal endometrial thickening, correlate with nonemergent outpatient pelvic ultrasound  The study was marked in [facility] for immediate notification              Workstation performed: [id number]               Results from last 7 days   Lab Units 11/06/22  1031 11/05/22  1930   WBC Thousand/uL 13 03* 14 75*   HEMOGLOBIN g/dL 13 4 15 0   HEMATOCRIT % 40 9 44 9   PLATELETS Thousands/uL 265 298   NEUTROS ABS Thousands/µL 9 39* 11 94*         Results from last 7 days   Lab Units 11/06/22  1031 11/05/22  1930   SODIUM mmol/L 138 136   POTASSIUM mmol/L 3 7 3 8   CHLORIDE mmol/L 107 102   CO2 mmol/L 23 24   ANION GAP mmol/L 8 10   BUN mg/dL 8 10   CREATININE mg/dL 0 51* 0 66   EGFR ml/min/1 73sq m 94 86   CALCIUM mg/dL 8 6 9 1     Results from last 7 days   Lab Units 11/06/22  1031 11/05/22  1930   AST U/L 15 19   ALT U/L 18 23   ALK PHOS U/L 40 47   TOTAL PROTEIN g/dL 6 3* 7 2   ALBUMIN g/dL 3 8 4 5   TOTAL BILIRUBIN mg/dL 0 87 1 10*         Results from last 7 days   Lab Units 11/06/22  1031 11/05/22  1930   GLUCOSE RANDOM mg/dL 96 129         Results from last 7 days   Lab Units 11/05/22  1930   PROTIME seconds 12 9   INR  0 95   PTT seconds 27             Results from last 7 days   Lab Units 11/05/22  1930   LACTIC ACID mmol/L 1 3         Results from last 7 days   Lab Units 11/05/22  1930   LIPASE u/L 16                 Results from last 7 days   Lab Units 11/05/22  2233   CLARITY UA  Clear   COLOR UA  Colorless   SPEC GRAV UA  1 016   PH UA  6 0   GLUCOSE UA mg/dl Negative   KETONES UA mg/dl Trace*   BLOOD UA  Negative   PROTEIN UA mg/dl Negative   NITRITE UA  Negative   BILIRUBIN UA  Negative   UROBILINOGEN UA (BE) mg/dl <2 0   LEUKOCYTES UA  Large*   WBC UA /hpf 30-50*   RBC UA /hpf 1-2 BACTERIA UA /hpf None Seen   EPITHELIAL CELLS WET PREP /hpf Occasional         Results from last 7 days   Lab Units 11/05/22  2359 11/05/22  2351   BLOOD CULTURE  Received in Microbiology Lab  Culture in Progress  Received in Microbiology Lab  Culture in Progress  ED Treatment:   Medication Administration from 11/05/2022 1841 to 11/06/2022 0419       Date/Time Order Dose Route Action     11/05/2022 2200 iohexol (OMNIPAQUE) 350 MG/ML injection (SINGLE-DOSE) 100 mL 100 mL Intravenous Given     11/06/2022 0011 cefTRIAXone (ROCEPHIN) IVPB (premix in dextrose) 1,000 mg 50 mL 1,000 mg Intravenous New Bag     11/06/2022 0135 metroNIDAZOLE (FLAGYL) IVPB (premix) 500 mg 100 mL 500 mg Intravenous New Bag     11/06/2022 0135 sodium chloride 0 9 % infusion 75 mL/hr Intravenous New Bag        Past Medical History:   Diagnosis Date   • Arthritis    • Endometriosis    • GERD (gastroesophageal reflux disease)      Present on Admission:  • 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      Admitting Diagnosis: Colitis [K52 9]  Rectal bleeding [K62 5]  Abdominal pain [R10 9]  Age/Sex: 78 y o  female  Admission Orders:  Scheduled Medications:  atorvastatin, 10 mg, Oral, Daily  cefTRIAXone, 1,000 mg, Intravenous, Q24H  influenza vaccine, 0 7 mL, Intramuscular, Once  metroNIDAZOLE, 500 mg, Intravenous, Q8H  pantoprazole, 40 mg, Oral, Early Morning      Continuous IV Infusions:  sodium chloride, 75 mL/hr, Intravenous, Continuous      PRN Meds:  acetaminophen, 650 mg, Oral, Q6H PRN  ondansetron, 4 mg, Intravenous, Q6H PRN        IP CONSULT TO GASTROENTEROLOGY    Network Utilization Review Department  ATTENTION: Please call with any questions or concerns to [phone] and carefully listen to the prompts so that you are directed to the right person   All voicemails are confidential   Dipti Childers all requests for admission clinical reviews, approved or denied determinations and any other requests to dedicated fax number below belonging to the campus where the patient is receiving treatment   List of dedicated fax numbers for the Facilities:  [address] [place] [address] DENIALS (Administrative/Medical Necessity) [phone]   [address] [place] [address] (Maternity/NICU/Pediatrics) [phone]   [address] [phone]   [address] [phone]   [address] [facility] [phone]   [facility] 134 8[facility] [phone]

## 2022-11-06 NOTE — PLAN OF CARE
Problem: Potential for Falls  Goal: Patient will remain free of falls  Description: INTERVENTIONS:  - Educate patient/family on patient safety including physical limitations  - Instruct patient to call for assistance with activity   - Consult OT/PT to assist with strengthening/mobility   - Keep Call bell within reach  - Keep bed low and locked with side rails adjusted as appropriate  - Keep care items and personal belongings within reach  - Initiate and maintain comfort rounds  - Make Fall Risk Sign visible to staff  - Apply yellow socks and bracelet for high fall risk patients  - Consider moving patient to room near nurses station  Outcome: Progressing     Problem: Nutrition/Hydration-ADULT  Goal: Nutrient/Hydration intake appropriate for improving, restoring or maintaining nutritional needs  Description: Monitor and assess patient's nutrition/hydration status for malnutrition  Collaborate with interdisciplinary team and initiate plan and interventions as ordered  Monitor patient's weight and dietary intake as ordered or per policy  Utilize nutrition screening tool and intervene as necessary  Determine patient's food preferences and provide high-protein, high-caloric foods as appropriate       INTERVENTIONS:  - Monitor oral intake, urinary output, labs, and treatment plans  - Assess nutrition and hydration status and recommend course of action  - Evaluate amount of meals eaten  - Assist patient with eating if necessary   - Allow adequate time for meals  - Recommend/ encourage appropriate diets, oral nutritional supplements, and vitamin/mineral supplements  - Order, calculate, and assess calorie counts as needed  - Recommend, monitor, and adjust tube feedings and TPN/PPN based on assessed needs  - Assess need for intravenous fluids  - Provide specific nutrition/hydration education as appropriate  - Include patient/family/caregiver in decisions related to nutrition  Outcome: Progressing     Problem: INFECTION - ADULT  Goal: Absence or prevention of progression during hospitalization  Description: INTERVENTIONS:  - Assess and monitor for signs and symptoms of infection  - Monitor lab/diagnostic results  - Monitor all insertion sites, i e  indwelling lines, tubes, and drains  - Monitor endotracheal if appropriate and nasal secretions for changes in amount and color  - [place] appropriate cooling/warming therapies per order  - Administer medications as ordered  - Instruct and encourage patient and family to use good hand hygiene technique  - Identify and instruct in appropriate isolation precautions for identified infection/condition  Outcome: Progressing

## 2022-11-06 NOTE — ED PROVIDER NOTES
History  Chief Complaint   Patient presents with   • Abdominal Pain     Patient reports LLQ abd pain starting yesterday evening - pain sharp, stabbing and intermittent  Additional c/o new onset blood in stool - reports small red bleeding last evening and now having intermittent uncontrolled rectal bleeding  Denies n/v/urinary symptoms  History provided by:  Patient   used: No    Abdominal Pain  Associated symptoms: no chest pain, no chills, no cough, no diarrhea, no dysuria, no fever, no nausea, no shortness of breath, no sore throat and no vomiting        Patient is a 79-year-old female presenting to emergency department with left lower quadrant abdominal pain that started last night  Associated rectal blood  Takes aspirin daily  Denies nausea vomiting  Denies urine symptoms  No fevers but having chills  Last colonoscopy 6 years ago  MDM will check abdominal labs, CT abdomen pelvis, patient does not want anything for pain at this time      Prior to Admission Medications   Prescriptions Last Dose Informant Patient Reported? Taking? B Complex Vitamins (VITAMIN B COMPLEX 100 IJ) 11/5/2022 at Unknown time  Yes Yes   Calcium-Magnesium 500-250 MG TABS 11/5/2022 at Unknown time Self Yes Yes   Sig: Take by mouth Take one tablet by mouth daily  Omega-3 Fatty Acids (OMEGA 3 PO) 11/5/2022 at Unknown time Self Yes Yes   Sig: Take by mouth Patient takes 2 tablets daily   Probiotic Product (PROBIOTIC PO) 11/5/2022 at Unknown time Self Yes Yes   Sig: Take by mouth Take one tablet by mouth daily     Vitamin E 100 UNIT/GM CREA Not Taking at Unknown time  Yes No   Patient not taking: Reported on 11/6/2022   aspirin (ECOTRIN LOW STRENGTH) 81 mg EC tablet 11/5/2022 at Unknown time Self Yes Yes   Sig: Take 81 mg by mouth daily   atorvastatin (LIPITOR) 10 mg tablet 11/5/2022 at Unknown time  No Yes   Sig: TAKE 1 TABLET BY MOUTH EVERY DAY   cholecalciferol (VITAMIN D3) 1,000 units tablet 11/5/2022 at Unknown time  Yes Yes   Sig: Take 1,000 Units by mouth daily   pantoprazole (PROTONIX) 40 mg tablet 11/5/2022 at Unknown time  Yes Yes   Sig: Take by mouth daily        Facility-Administered Medications: None       Past Medical History:   Diagnosis Date   • Arthritis    • Endometriosis    • GERD (gastroesophageal reflux disease)        Past Surgical History:   Procedure Laterality Date   • BELPHAROPTOSIS REPAIR Bilateral    • COLPOPEXY      laparoscopic   • TONSILLECTOMY         Family History   Problem Relation Age of Onset   • Heart failure Mother    • Dementia Mother    • Atrial fibrillation Mother    • Pancreatic cancer Father    • Sleep apnea Brother    • Atrial fibrillation Brother    • Breast cancer Paternal Aunt      I have reviewed and agree with the history as documented  E-Cigarette/Vaping     E-Cigarette/Vaping Substances     Social History     Tobacco Use   • Smoking status: Former Smoker     Years: 3 00   • Smokeless tobacco: Never Used   Substance Use Topics   • Alcohol use: Yes     Alcohol/week: 5 0 standard drinks     Types: 5 Glasses of wine per week   • Drug use: No       Review of Systems   Constitutional: Negative for chills, diaphoresis and fever  HENT: Negative for congestion and sore throat  Respiratory: Negative for cough, shortness of breath, wheezing and stridor  Cardiovascular: Negative for chest pain, palpitations and leg swelling  Gastrointestinal: Positive for abdominal pain and blood in stool  Negative for diarrhea, nausea and vomiting  Genitourinary: Negative for dysuria, frequency and urgency  Musculoskeletal: Negative for neck pain and neck stiffness  Skin: Negative for pallor and rash  Neurological: Negative for dizziness, syncope, weakness, light-headedness and headaches  All other systems reviewed and are negative  Physical Exam  Physical Exam  Vitals reviewed  Constitutional:       Appearance: She is well-developed     HENT:      Head: Normocephalic and atraumatic  Eyes:      Pupils: Pupils are equal, round, and reactive to light  Cardiovascular:      Rate and Rhythm: Normal rate and regular rhythm  Heart sounds: Normal heart sounds  Pulmonary:      Effort: Pulmonary effort is normal  No respiratory distress  Breath sounds: Normal breath sounds  Abdominal:      General: Bowel sounds are normal       Palpations: Abdomen is soft  Tenderness: There is abdominal tenderness in the left lower quadrant  Musculoskeletal:      Cervical back: Neck supple  Skin:     General: Skin is warm and dry  Capillary Refill: Capillary refill takes less than 2 seconds  Neurological:      General: No focal deficit present  Mental Status: She is alert and oriented to person, place, and time           Vital Signs  ED Triage Vitals [11/05/22 1930]   Temperature Pulse Respirations Blood Pressure SpO2   99 9 °F (37 7 °C) 82 16 146/75 97 %      Temp Source Heart Rate Source Patient Position - Orthostatic VS BP Location FiO2 (%)   Oral Monitor Sitting Right arm --      Pain Score       3           Vitals:    11/06/22 0417 11/06/22 0737 11/06/22 1533 11/06/22 2200   BP: 134/81 117/71 121/72 115/70   Pulse: 91 82 91 77   Patient Position - Orthostatic VS: Sitting  Sitting          Visual Acuity      ED Medications  Medications   metroNIDAZOLE (FLAGYL) IVPB (premix) 500 mg 100 mL (500 mg Intravenous New Bag 11/6/22 1504)   atorvastatin (LIPITOR) tablet 10 mg (10 mg Oral Given 11/6/22 0802)   pantoprazole (PROTONIX) EC tablet 40 mg (40 mg Oral Given 11/6/22 0516)   sodium chloride 0 9 % infusion (75 mL/hr Intravenous New Bag 11/6/22 1719)   acetaminophen (TYLENOL) tablet 650 mg (650 mg Oral Given 11/6/22 1540)   ondansetron (ZOFRAN) injection 4 mg (has no administration in time range)   cefTRIAXone (ROCEPHIN) IVPB (premix in dextrose) 1,000 mg 50 mL (1,000 mg Intravenous New Bag 11/6/22 2203)   influenza vaccine, high-dose (FLUZONE HIGH-DOSE) IM injection NELIDA 0 7 mL (has no administration in time range)   iohexol (OMNIPAQUE) 350 MG/ML injection (SINGLE-DOSE) 100 mL (100 mL Intravenous Given 11/5/22 2200)   cefTRIAXone (ROCEPHIN) IVPB (premix in dextrose) 1,000 mg 50 mL (0 mg Intravenous Stopped 11/6/22 0041)       Diagnostic Studies  Results Reviewed     Procedure Component Value Units Date/Time    Stool Enteric Bacterial Panel by PCR [[id number]]  (Normal) Collected: 11/06/22 0143    Lab Status: Final result Specimen: Stool from Rectum Updated: 11/06/22 1158     Salmonella sp PCR None Detected     Shigella sp/Enteroinvasive E  coli (EIEC) PCR None Detected     Campylobacter sp (jejuni and coli) PCR None Detected     Shiga toxin 1/Shiga toxin 2 genes PCR None Detected    Clostridium difficile toxin by PCR with EIA [[id number]]  (Normal) Collected: 11/06/22 0143    Lab Status: Final result Specimen: Stool from Per Rectum Updated: 11/06/22 1137     C difficile toxin by PCR Negative    Comprehensive metabolic panel [[id number]]  (Abnormal) Collected: 11/06/22 1031    Lab Status: Final result Specimen: Blood from Hand, Left Updated: 11/06/22 1105     Sodium 138 mmol/L      Potassium 3 7 mmol/L      Chloride 107 mmol/L      CO2 23 mmol/L      ANION GAP 8 mmol/L      BUN 8 mg/dL      Creatinine 0 51 mg/dL      Glucose 96 mg/dL      Calcium 8 6 mg/dL      AST 15 U/L      ALT 18 U/L      Alkaline Phosphatase 40 U/L      Total Protein 6 3 g/dL      Albumin 3 8 g/dL      Total Bilirubin 0 87 mg/dL      eGFR 94 ml/min/1 73sq m     Narrative:      [place] guidelines for Chronic Kidney Disease (CKD):   •  Stage 1 with normal or high GFR (GFR > 90 mL/min/1 73 square meters)  •  Stage 2 Mild CKD (GFR = 60-89 mL/min/1 73 square meters)  •  Stage 3A Moderate CKD (GFR = 45-59 mL/min/1 73 square meters)  •  Stage 3B Moderate CKD (GFR = 30-44 mL/min/1 73 square meters)  •  Stage 4 Severe CKD (GFR = 15-29 mL/min/1 73 square meters)  •  Stage 5 End Stage CKD (GFR <15 mL/min/1 73 square meters)  Note: GFR calculation is accurate only with a steady state creatinine    CBC and differential [[id number]]  (Abnormal) Collected: 11/06/22 1031    Lab Status: Final result Specimen: Blood from Hand, Left Updated: 11/06/22 1046     WBC 13 03 Thousand/uL      RBC 4 15 Million/uL      Hemoglobin 13 4 g/dL      Hematocrit 40 9 %      MCV 99 fL      MCH 32 3 pg      MCHC 32 8 g/dL      RDW 12 6 %      MPV 9 2 fL      Platelets 162 Thousands/uL      nRBC 0 /100 WBCs      Neutrophils Relative 71 %      Immat GRANS % 1 %      Lymphocytes Relative 16 %      Monocytes Relative 9 %      Eosinophils Relative 2 %      Basophils Relative 1 %      Neutrophils Absolute 9 39 Thousands/µL      Immature Grans Absolute 0 08 Thousand/uL      Lymphocytes Absolute 2 08 Thousands/µL      Monocytes Absolute 1 18 Thousand/µL      Eosinophils Absolute 0 24 Thousand/µL      Basophils Absolute 0 06 Thousands/µL     Blood culture #1 [[id number]] Collected: 11/05/22 2351    Lab Status: Preliminary result Specimen: Blood from Arm, Left Updated: 11/06/22 0803     Blood Culture Received in Microbiology Lab  Culture in Progress  Blood culture #2 [[id number]] Collected: 11/05/22 2359    Lab Status: Preliminary result Specimen: Blood from Arm, Right Updated: 11/06/22 0803     Blood Culture Received in Microbiology Lab  Culture in Progress  Calprotectin,Fecal [[id number]] Collected: 11/06/22 0143    Lab Status: In process Specimen: Stool from Rectum Updated: 11/06/22 0153    Giardia lamblia, EIA and Ova and Parasites Examination [[id number]] Collected: 11/06/22 0143    Lab Status:  In process Specimen: Stool from Rectum Updated: 11/06/22 0151    Urine Microscopic [[id number]]  (Abnormal) Collected: 11/05/22 2233    Lab Status: Final result Specimen: Urine, Clean Catch Updated: 11/05/22 2332     RBC, UA 1-2 /hpf      WBC, UA 30-50 /hpf      Epithelial Cells Occasional /hpf      Bacteria, UA None Seen /hpf Urine culture [[id number]] Collected: 11/05/22 2233    Lab Status:  In process Specimen: Urine, Clean Catch Updated: 11/05/22 2332    UA w Reflex to Microscopic w Reflex to Culture [[id number]]  (Abnormal) Collected: 11/05/22 2233    Lab Status: Final result Specimen: Urine, Clean Catch Updated: 11/05/22 2329     Color, UA Colorless     Clarity, UA Clear     Specific Gravity, UA 1 016     pH, UA 6 0     Leukocytes, UA Large     Nitrite, UA Negative     Protein, UA Negative mg/dl      Glucose, UA Negative mg/dl      Ketones, UA Trace mg/dl      Urobilinogen, UA <2 0 mg/dl      Bilirubin, UA Negative     Occult Blood, UA Negative    Comprehensive metabolic panel [[id number]]  (Abnormal) Collected: 11/05/22 1930    Lab Status: Final result Specimen: Blood from Arm, Left Updated: 11/05/22 2011     Sodium 136 mmol/L      Potassium 3 8 mmol/L      Chloride 102 mmol/L      CO2 24 mmol/L      ANION GAP 10 mmol/L      BUN 10 mg/dL      Creatinine 0 66 mg/dL      Glucose 129 mg/dL      Calcium 9 1 mg/dL      AST 19 U/L      ALT 23 U/L      Alkaline Phosphatase 47 U/L      Total Protein 7 2 g/dL      Albumin 4 5 g/dL      Total Bilirubin 1 10 mg/dL      eGFR 86 ml/min/1 73sq m     Narrative:      [place] guidelines for Chronic Kidney Disease (CKD):   •  Stage 1 with normal or high GFR (GFR > 90 mL/min/1 73 square meters)  •  Stage 2 Mild CKD (GFR = 60-89 mL/min/1 73 square meters)  •  Stage 3A Moderate CKD (GFR = 45-59 mL/min/1 73 square meters)  •  Stage 3B Moderate CKD (GFR = 30-44 mL/min/1 73 square meters)  •  Stage 4 Severe CKD (GFR = 15-29 mL/min/1 73 square meters)  •  Stage 5 End Stage CKD (GFR <15 mL/min/1 73 square meters)  Note: GFR calculation is accurate only with a steady state creatinine    Lipase [[id number]]  (Normal) Collected: 11/05/22 1930    Lab Status: Final result Specimen: Blood from Arm, Left Updated: 11/05/22 2011     Lipase 16 u/L     Lactic acid, plasma [[id number]]  (Normal) Collected: 11/05/22 1930    Lab Status: Final result Specimen: Blood from Arm, Left Updated: 11/05/22 2009     LACTIC ACID 1 3 mmol/L     Narrative:      Result may be elevated if tourniquet was used during collection  Protime-INR [[id number]]  (Normal) Collected: 11/05/22 1930    Lab Status: Final result Specimen: Blood from Arm, Left Updated: 11/05/22 2007     Protime 12 9 seconds      INR 0 95    APTT [[id number]]  (Normal) Collected: 11/05/22 1930    Lab Status: Final result Specimen: Blood from Arm, Left Updated: 11/05/22 2007     PTT 27 seconds     CBC and differential [[id number]]  (Abnormal) Collected: 11/05/22 1930    Lab Status: Final result Specimen: Blood from Arm, Left Updated: 11/05/22 1957     WBC 14 75 Thousand/uL      RBC 4 59 Million/uL      Hemoglobin 15 0 g/dL      Hematocrit 44 9 %      MCV 98 fL      MCH 32 7 pg      MCHC 33 4 g/dL      RDW 12 4 %      MPV 9 2 fL      Platelets 247 Thousands/uL      nRBC 0 /100 WBCs      Neutrophils Relative 81 %      Immat GRANS % 0 %      Lymphocytes Relative 10 %      Monocytes Relative 8 %      Eosinophils Relative 1 %      Basophils Relative 0 %      Neutrophils Absolute 11 94 Thousands/µL      Immature Grans Absolute 0 06 Thousand/uL      Lymphocytes Absolute 1 49 Thousands/µL      Monocytes Absolute 1 15 Thousand/µL      Eosinophils Absolute 0 07 Thousand/µL      Basophils Absolute 0 04 Thousands/µL                  CT abdomen pelvis with contrast   Final Result by Liborio Burnett MD (11/05 2335)      1  Severe diffuse wall thickening of the distal transverse and descending colon surrounding fat stranding most compatible with nonspecific colitis less likely diverticulitis given diffuse inflammatory change  2   Suggestion of postmenopausal endometrial thickening, correlate with nonemergent outpatient pelvic ultrasound  The study was marked in [facility] for immediate notification              Workstation performed: [id number] Procedures  Procedures         ED Course                               SBIRT 20yo+    Flowsheet Row Most Recent Value   SBIRT (25 yo +)    In order to provide better care to our patients, we are screening all of our patients for alcohol and drug use  Would it be okay to ask you these screening questions? Unable to answer at this time Filed at: 11/05/2022 1930                    MDM    Disposition  Final diagnoses:   Colitis   Rectal bleeding     Time reflects when diagnosis was documented in both MDM as applicable and the Disposition within this note     Time User Action Codes Description Comment    11/5/2022 11:45 PM Lupe Pressley [K52 [id number]] Colitis     11/5/2022 11:45 PM Lupe Pressley [[id number]] Rectal bleeding       ED Disposition     ED Disposition   Admit    Condition   Stable    Date/Time   Sat Nov 5, 2022 11:45 PM    Comment   Case was discussed with dharmesh and the patient's admission status was agreed to be Admission Status: inpatient status to the service of Dr Tami Puckett  Follow-up Information    None         Current Discharge Medication List      CONTINUE these medications which have NOT CHANGED    Details   aspirin (ECOTRIN LOW STRENGTH) 81 mg EC tablet Take 81 mg by mouth daily      atorvastatin (LIPITOR) 10 mg tablet TAKE 1 TABLET BY MOUTH EVERY DAY  Qty: 90 tablet, Refills: 3    Associated Diagnoses: Pure hypercholesterolemia      B Complex Vitamins (VITAMIN B COMPLEX 100 IJ)       Calcium-Magnesium 500-250 MG TABS Take by mouth Take one tablet by mouth daily  cholecalciferol (VITAMIN D3) 1,000 units tablet Take 1,000 Units by mouth daily      Omega-3 Fatty Acids (OMEGA 3 PO) Take by mouth Patient takes 2 tablets daily      pantoprazole (PROTONIX) 40 mg tablet Take by mouth daily        Probiotic Product (PROBIOTIC PO) Take by mouth Take one tablet by mouth daily  Vitamin E 100 UNIT/GM CREA              No discharge procedures on file      PDMP Review     None          ED Provider  Electronically Signed by           Jose Perry MD  11/06/22 1396

## 2022-11-06 NOTE — ASSESSMENT & PLAN NOTE
Patient presents with generalized lower abdominal pain that started 2 days ago with diarrhea and BRBPR  Yesterday, she states she had increased rectal bleeding which prompted her to come to the ER  States she was at the [employer]" over the past few days     · CT A/P shows severe diffuse wall thickening of the distal transverse and descending colon with surrounding fat stranding most compatible with nonspecific colitis; less likely diverticulitis   · With leukocytosis 14 75, does not meet SIRS criteria   · Check for infectious causes of colitis with stool culture and C diff   · Ordered fecal calprotectin   · Empiric abx started in the ED- will continue Ceftriaxone + Flagyl   · IVF   · Clear liquid diet as tolerated   · Hgb currently stable at 15, Monitor H+H and hemodynamics  · GI consult appreciated

## 2022-11-07 LAB
ANION GAP SERPL CALCULATED.3IONS-SCNC: 7 MMOL/L (ref 4–13)
BACTERIA UR CULT: NORMAL
BUN SERPL-MCNC: 7 MG/DL (ref 5–25)
CALCIUM SERPL-MCNC: 8.4 MG/DL (ref 8.4–10.2)
CHLORIDE SERPL-SCNC: 109 MMOL/L (ref 96–108)
CO2 SERPL-SCNC: 24 MMOL/L (ref 21–32)
CREAT SERPL-MCNC: 0.63 MG/DL (ref 0.6–1.3)
ERYTHROCYTE [DISTWIDTH] IN BLOOD BY AUTOMATED COUNT: 12.7 % (ref 11.6–15.1)
GFR SERPL CREATININE-BSD FRML MDRD: 88 ML/MIN/1.73SQ M
GLUCOSE SERPL-MCNC: 94 MG/DL (ref 65–140)
HCT VFR BLD AUTO: 39.9 % (ref 34.8–46.1)
HGB BLD-MCNC: 12.9 G/DL (ref 11.5–15.4)
MCH RBC QN AUTO: 32.7 PG (ref 26.8–34.3)
MCHC RBC AUTO-ENTMCNC: 32.3 G/DL (ref 31.4–37.4)
MCV RBC AUTO: 101 FL (ref 82–98)
PLATELET # BLD AUTO: 283 THOUSANDS/UL (ref 149–390)
PMV BLD AUTO: 9.7 FL (ref 8.9–12.7)
POTASSIUM SERPL-SCNC: 3.5 MMOL/L (ref 3.5–5.3)
RBC # BLD AUTO: 3.94 MILLION/UL (ref 3.81–5.12)
SODIUM SERPL-SCNC: 140 MMOL/L (ref 135–147)
WBC # BLD AUTO: 11.35 THOUSAND/UL (ref 4.31–10.16)

## 2022-11-07 RX ORDER — METRONIDAZOLE 500 MG/1
500 TABLET ORAL EVERY 8 HOURS SCHEDULED
Qty: 15 TABLET | Refills: 0 | Status: SHIPPED | OUTPATIENT
Start: 2022-11-07 | End: 2022-11-09 | Stop reason: SDUPTHER

## 2022-11-07 RX ORDER — CEFDINIR 300 MG/1
300 CAPSULE ORAL EVERY 12 HOURS SCHEDULED
Qty: 10 CAPSULE | Refills: 0 | Status: SHIPPED | OUTPATIENT
Start: 2022-11-07 | End: 2022-11-09 | Stop reason: SDUPTHER

## 2022-11-07 RX ADMIN — METRONIDAZOLE 500 MG: 5 INJECTION, SOLUTION INTRAVENOUS at 08:17

## 2022-11-07 RX ADMIN — METRONIDAZOLE 500 MG: 5 INJECTION, SOLUTION INTRAVENOUS at 23:52

## 2022-11-07 RX ADMIN — METRONIDAZOLE 500 MG: 5 INJECTION, SOLUTION INTRAVENOUS at 14:55

## 2022-11-07 RX ADMIN — CEFTRIAXONE 1000 MG: 1 INJECTION, SOLUTION INTRAVENOUS at 22:31

## 2022-11-07 RX ADMIN — SODIUM CHLORIDE 75 ML/HR: 0.9 INJECTION, SOLUTION INTRAVENOUS at 08:17

## 2022-11-07 RX ADMIN — ATORVASTATIN CALCIUM 10 MG: 10 TABLET, FILM COATED ORAL at 08:17

## 2022-11-07 RX ADMIN — PANTOPRAZOLE SODIUM 40 MG: 40 TABLET, DELAYED RELEASE ORAL at 05:34

## 2022-11-07 RX ADMIN — SODIUM CHLORIDE 75 ML/HR: 0.9 INJECTION, SOLUTION INTRAVENOUS at 20:33

## 2022-11-07 NOTE — DISCHARGE INSTR - AVS FIRST PAGE
Dear Steward Holter,     It was our pleasure to care for you here at [facility], [facility]  It is our hope that we were always able to exceed the expected standards for your care during your stay  You were hospitalized due to colitis  You were cared for on the 4th floor by [facility] under the service of Nel Frias MD with the [facility] who covers for your primary care physician (PCP), Mami Junior MD, while you were hospitalized  If you have any questions or concerns related to this hospitalization, you may contact us at [phone]  For follow up as well as any medication refills, we recommend that you follow up with your primary care physician  A registered nurse will reach out to you by phone within a few days after your discharge to answer any additional questions that you may have after going home  However, at this time we provide for you here, the most important instructions / recommendations at discharge:     Notable Medication Adjustments -   Take cefdinir/Omnicef twice a day for 4 more days  This is an antibiotic for your colitis  Take flagyl 500 mg three times a day for 4 more days  This is an antibiotic for your colitis  Testing Required after Discharge -   You will need a colonoscopy in a few weeks from now  Follow-up with your family doctor regarding a pelvic ultrasound given on CT scan your  uterus looked slightly thickened  Important follow up information -    Outpatient follow-up with your primary care physician in 1 week  Outpatient follow-up with your gastroenterologist in 4 weeks  Other Instructions -   Hydrate yourself well every day  Eat nutritious food; low-fat food  If you develop significant diarrhea or blood in your stools, or significant abdominal pains again, fever and/or chills, nausea or vomiting, call your primary care physician and/or go to the nearest emergency room    Please review this entire after visit summary as additional general instructions including medication list, appointments, activity, diet, any pertinent wound care, and other additional recommendations from your care team that may be provided for you        Sincerely,   Mayra Morgan PA-C

## 2022-11-07 NOTE — PROGRESS NOTES
Progress Note- Stephani Quiroz 78 y o  female MRN: [id number]    Unit/Bed#: S -01 Encounter: [id number]      Assessment and Plan:    1  Left sided colitis w/ rectal bleeding: Pt presented with diarrhea after going out to dinner followed by nausea/diaphoresis, abdominal pain, and blood per rectum  Suspect infectious with possible superimposed ischemic colitis  Appears to be clinically improving  Leukocytosis is trending down from 13 to 11, hemoglobin remains stable  She reports abdominal pain has improved significantly and she had 1 brown BM with just blood per rectum w/ wiping this AM  Last colonoscopy approximately 6 years ago      -C  diff PCR/enteric stool panel negative  Follow up fecal calprotectin, O+P/giardia stool studies still in process    -Continue antibiotics for total of 7 days     -Advance to low fiber/low residue diet     -Recommend colonoscopy in the next few months as outpatient to follow-up, ensure mucosal healing and rule out underlying adenomatous lesions            ______________________________________________________________________    Subjective:     Pt seen with  at bedside  She reports abdominal pain has improved significantly and she had 1 brown BM with just a small amount of blood per rectum w/ wiping this AM, none in toilet bowl  Last colonoscopy approximately 6 years ago  Diet advanced for lunch      Medication Administration - last 24 hours from 11/06/2022 1001 to 11/07/2022 1001       Date/Time Order Dose Route Action Action by     11/07/2022 0817 metroNIDAZOLE (FLAGYL) IVPB (premix) 500 mg 100 mL 500 mg Intravenous New Bag Kim Galati     11/06/2022 2323 metroNIDAZOLE (FLAGYL) IVPB (premix) 500 mg 100 mL 500 mg Intravenous Sunny 37 Nichole Fitzgerald RN     11/06/2022 1504 metroNIDAZOLE (FLAGYL) IVPB (premix) 500 mg 100 mL 500 mg Intravenous New Bag Kim Galati     11/07/2022 0817 atorvastatin (LIPITOR) tablet 10 mg 10 mg Oral Given Shonda Blake     11/07/2022 0560 pantoprazole (PROTONIX) EC tablet 40 mg 40 mg Oral Given Tomy Cockayne, RN     11/07/2022 0817 sodium chloride 0 9 % infusion 75 mL/hr Intravenous New Bag Kim Galsylvia     11/06/2022 1719 sodium chloride 0 9 % infusion 75 mL/hr Intravenous New Bag Kim Duenasati     11/06/2022 1540 acetaminophen (TYLENOL) tablet 650 mg 650 mg Oral Given Calin Tiffanie     11/06/2022 2203 cefTRIAXone (ROCEPHIN) IVPB (premix in dextrose) 1,000 mg 50 mL 1,000 mg Intravenous New Bag Tomy Cockayne, RN          Objective:     Vitals: Blood pressure 136/81, pulse 93, temperature 99 2 °F (37 3 °C), resp  rate 18, height 5' 9" (1 753 m), weight 74 8 kg (165 lb), SpO2 95 %  ,Body mass index is 24 37 kg/m²        Intake/Output Summary (Last 24 hours) at 11/7/2022 1001  Last data filed at 11/7/2022 0817  Gross per 24 hour   Intake 2302 5 ml   Output --   Net 2302 5 ml       Physical Exam:   General Appearance: Awake and alert, in no acute distress  Abdomen: Soft, non-tender, non-distended; bowel sounds normal; no masses or no organomegaly    Invasive Devices  Report    Peripheral Intravenous Line  Duration           Peripheral IV 11/05/22 Left Antecubital 1 day    Peripheral IV 11/05/22 Right Antecubital 1 day                Lab Results:  Admission on 11/05/2022   Component Date Value   • WBC 11/05/2022 14 75 (A)   • RBC 11/05/2022 4 59    • Hemoglobin 11/05/2022 15 0    • Hematocrit 11/05/2022 44 9    • MCV 11/05/2022 98    • MCH 11/05/2022 32 7    • MCHC 11/05/2022 33 4    • RDW 11/05/2022 12 4    • MPV 11/05/2022 9 2    • Platelets 94/13/2858 298    • nRBC 11/05/2022 0    • Neutrophils Relative 11/05/2022 81 (A)   • Immat GRANS % 11/05/2022 0    • Lymphocytes Relative 11/05/2022 10 (A)   • Monocytes Relative 11/05/2022 8    • Eosinophils Relative 11/05/2022 1    • Basophils Relative 11/05/2022 0    • Neutrophils Absolute 11/05/2022 11 94 (A)   • Immature Grans Absolute 11/05/2022 0 06    • Lymphocytes Absolute 11/05/2022 1 49    • Monocytes Absolute 11/05/2022 1 15    • Eosinophils Absolute 11/05/2022 0 07    • Basophils Absolute 11/05/2022 0 04    • Sodium 11/05/2022 136    • Potassium 11/05/2022 3 8    • Chloride 11/05/2022 102    • CO2 11/05/2022 24    • ANION GAP 11/05/2022 10    • BUN 11/05/2022 10    • Creatinine 11/05/2022 0 66    • Glucose 11/05/2022 129    • Calcium 11/05/2022 9 1    • AST 11/05/2022 19    • ALT 11/05/2022 23    • Alkaline Phosphatase 11/05/2022 47    • Total Protein 11/05/2022 7 2    • Albumin 11/05/2022 4 5    • Total Bilirubin 11/05/2022 1 10 (A)   • eGFR 11/05/2022 86    • Lipase 11/05/2022 16    • LACTIC ACID 11/05/2022 1 3    • PTT 11/05/2022 27    • Protime 11/05/2022 12 9    • INR 11/05/2022 0 95    • Color, UA 11/05/2022 Colorless    • Clarity, UA 11/05/2022 Clear    • Specific [place], UA 11/05/2022 1 016    • pH, UA 11/05/2022 6 0    • Leukocytes, UA 11/05/2022 Large (A)   • Nitrite, UA 11/05/2022 Negative    • Protein, UA 11/05/2022 Negative    • Glucose, UA 11/05/2022 Negative    • Ketones, UA 11/05/2022 Trace (A)   • Urobilinogen, UA 11/05/2022 <2 0    • Bilirubin, UA 11/05/2022 Negative    • Occult Blood, UA 11/05/2022 Negative    • RBC, UA 11/05/2022 1-2    • WBC, UA 11/05/2022 30-50 (A)   • Epithelial Cells 11/05/2022 Occasional    • Bacteria, UA 11/05/2022 None Seen    • Urine Culture 11/05/2022 30,000-39,000 cfu/ml     • Blood Culture 11/05/2022 No Growth at 24 hrs  • Blood Culture 11/05/2022 No Growth at 24 hrs      • Salmonella sp PCR 11/06/2022 None Detected    • Shigella sp/Enteroinvasi* 11/06/2022 None Detected    • Campylobacter sp (jejuni* 11/06/2022 None Detected    • Shiga toxin 1/Shiga toxi* 11/06/2022 None Detected    • C difficile toxin by PCR 11/06/2022 Negative    • Sodium 11/06/2022 138    • Potassium 11/06/2022 3 7    • Chloride 11/06/2022 107    • CO2 11/06/2022 23    • ANION GAP 11/06/2022 8    • BUN 11/06/2022 8    • Creatinine 11/06/2022 0 51 (A)   • Glucose 11/06/2022 96    • Calcium 11/06/2022 8 6    • AST 11/06/2022 15    • ALT 11/06/2022 18    • Alkaline Phosphatase 11/06/2022 40    • Total Protein 11/06/2022 6 3 (A)   • Albumin 11/06/2022 3 8    • Total Bilirubin 11/06/2022 0 87    • eGFR 11/06/2022 94    • WBC 11/06/2022 13 03 (A)   • RBC 11/06/2022 4 15    • Hemoglobin 11/06/2022 13 4    • Hematocrit 11/06/2022 40 9    • MCV 11/06/2022 99 (A)   • MCH 11/06/2022 32 3    • MCHC 11/06/2022 32 8    • RDW 11/06/2022 12 6    • MPV 11/06/2022 9 2    • Platelets 23/90/1139 265    • nRBC 11/06/2022 0    • Neutrophils Relative 11/06/2022 71    • Immat GRANS % 11/06/2022 1    • Lymphocytes Relative 11/06/2022 16    • Monocytes Relative 11/06/2022 9    • Eosinophils Relative 11/06/2022 2    • Basophils Relative 11/06/2022 1    • Neutrophils Absolute 11/06/2022 9 39 (A)   • Immature Grans Absolute 11/06/2022 0 08    • Lymphocytes Absolute 11/06/2022 2 08    • Monocytes Absolute 11/06/2022 1 18    • Eosinophils Absolute 11/06/2022 0 24    • Basophils Absolute 11/06/2022 0 06    • WBC 11/07/2022 11 35 (A)   • RBC 11/07/2022 3 94    • Hemoglobin 11/07/2022 12 9    • Hematocrit 11/07/2022 39 9    • MCV 11/07/2022 101 (A)   • MCH 11/07/2022 32 7    • MCHC 11/07/2022 32 3    • RDW 11/07/2022 12 7    • Platelets 59/24/0992 283    • MPV 11/07/2022 9 7    • Sodium 11/07/2022 140    • Potassium 11/07/2022 3 5    • Chloride 11/07/2022 109 (A)   • CO2 11/07/2022 24    • ANION GAP 11/07/2022 7    • BUN 11/07/2022 7    • Creatinine 11/07/2022 0 63    • Glucose 11/07/2022 94    • Calcium 11/07/2022 8 4    • eGFR 11/07/2022 88        Imaging Studies: I have personally reviewed pertinent imaging studies

## 2022-11-07 NOTE — DISCHARGE SUMMARY
[facility]  Discharge- Brandon Ryan 1945, 78 y o  female MRN: [id number]  Unit/Bed#: S -01 Encounter: [id number]  Primary Care Provider: Rutherford Goodpasture, MD   Date and time admitted to hospital: 11/5/2022  8:58 PM    * Colitis  Assessment & Plan  · Abdominal pain, diarrhea and BRBPR  · CT A/P shows severe diffuse wall thickening of the distal transverse and descending colon with surrounding fat stranding most compatible with nonspecific colitis; less likely diverticulitis   · With leukocytosis 14 75 on admission but no other septic criteria  White count improved to 11 35  · Stool enteric panel and C diff negative  · fecal calprotectin giardia pending  · Status post 2 days of IV ceftriaxone and Flagyl  Will discharge with an additional 5 days for total 7 day course with Omnicef and Flagyl orally  · Status post IV fluid resuscitation  · Diet was advanced and patient tolerated  · Outpatient colonoscopy 6-8 weeks as outpt    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  Assessment & Plan  · Continue protonix     Increased endometrial stripe thickness  Assessment & Plan  · Postmenopausal endometrial thickening noted on CT   · Outpatient pelvic ultrasound     Medical Problems             Resolved Problems  Date Reviewed: 11/7/2022   None               Discharging Physician / Practitioner: Jamie Lane  PCP: Rutherford Goodpasture, MD  Admission Date:   Admission Orders (From admission, onward)     Ordered        11/05/22 2345  INPATIENT ADMISSION  Once                      Discharge Date: 11/07/22    Consultations During Hospital Stay:  · GI    Procedures Performed:   · none    Significant Findings / Test Results:   · CT AP: Severe diffuse wall thickening of the distal transverse and descending colon surrounding fat stranding most compatible with nonspecific colitis less likely diverticulitis given diffuse inflammatory change   Suggestion of postmenopausal endometrial thickening, correlate with nonemergent outpatient pelvic ultrasound  Incidental Findings:   · Endometrial thickening     Test Results Pending at Discharge (will require follow up): · Fecal calprotectin  · Giardia/O and P  · Final blood cultures     Outpatient Tests Requested:  · Pelvic ultrasound    Complications:  None    Reason for Admission:  Abdominal pain and bright red blood per rectum    Hospital Course:   Opal Sandy is a 78 y o  female patient who originally presented to the hospital on 11/5/2022 due to abdominal pain and bloody red blood per rectum  Patient was noted to have been at the [facility] room was eating things different than her normal dietary intake  She developed abdominal pain, diarrhea and bloody bowel movements  Patient was noted to be subsequently admitted, seen in consult by GI  Patient was started on ceftriaxone and Flagyl  Her bloody bowel movements resolved and her diet was advanced and she tolerated this well with no abdominal pain  Please see above list of diagnoses and related plan for additional information  Condition at Discharge: stable    Discharge Day Visit / Exam:   Subjective:  No nausea or vomiting  No chest pain or shortness of breath  Abdominal pain improving  No further blood in bowel movements    Vitals: Blood Pressure: 127/74 (11/07/22 1535)  Pulse: 83 (11/07/22 1535)  Temperature: 98 5 °F (36 9 °C) (11/07/22 1535)  Temp Source: Oral (11/06/22 1533)  Respirations: 18 (11/07/22 1535)  Height: 5' 9" (175 3 cm) (11/05/22 1930)  Weight - Scale: 74 8 kg (165 lb) (11/05/22 1930)  SpO2: 96 % (11/07/22 1535)    Exam:   Physical Exam  Vitals and nursing note reviewed  Constitutional:       General: She is not in acute distress  Appearance: Normal appearance  She is not diaphoretic  HENT:      Head: Normocephalic and atraumatic  Mouth/Throat:      Mouth: Mucous membranes are moist    Cardiovascular:      Rate and Rhythm: Normal rate and regular rhythm     Pulmonary: Effort: Pulmonary effort is normal       Breath sounds: Normal breath sounds  No stridor  No wheezing, rhonchi or rales  Abdominal:      General: Bowel sounds are normal       Palpations: Abdomen is soft  There is no mass  Tenderness: There is no abdominal tenderness  There is no guarding  Comments: NTTP  No rebound or guarding   Musculoskeletal:      Right lower leg: No edema  Left lower leg: No edema  Skin:     General: Skin is warm and dry  Neurological:      Mental Status: She is alert  Psychiatric:         Mood and Affect: Mood normal          Behavior: Behavior normal           Discussion with Family: Patient declined call to   Discharge instructions/Information to patient and family:   See after visit summary for information provided to patient and family  Provisions for Follow-Up Care:  See after visit summary for information related to follow-up care and any pertinent home health orders  Disposition:   Home    Planned Readmission: no     Discharge Statement:  I spent 45 minutes discharging the patient  This time was spent on the day of discharge  I had direct contact with the patient on the day of discharge  Greater than 50% of the total time was spent examining patient, answering all patient questions, arranging and discussing plan of care with patient as well as directly providing post-discharge instructions  Additional time then spent on discharge activities  Discharge Medications:  See after visit summary for reconciled discharge medications provided to patient and/or family        **Please Note: This note may have been constructed using a voice recognition system**

## 2022-11-07 NOTE — INCIDENTAL FINDINGS
The following findings require follow up:  Radiographic finding   Finding:   Thickened endometrium   Follow up required:  Pelvic ultrasound   Follow up should be done within 1 month(s)    Please notify the following clinician to assist with the follow up:   Dr Keiry Amaya

## 2022-11-07 NOTE — ASSESSMENT & PLAN NOTE
· Abdominal pain, diarrhea and BRBPR  · CT A/P shows severe diffuse wall thickening of the distal transverse and descending colon with surrounding fat stranding most compatible with nonspecific colitis; less likely diverticulitis   · With leukocytosis 14 75 on admission but no other septic criteria  White count improved to 11 35  · Stool enteric panel and C diff negative  · fecal calprotectin giardia pending  · Status post 2 days of IV ceftriaxone and Flagyl    Will discharge with an additional 5 days for total 7 day course with Omnicef and Flagyl orally  · Status post IV fluid resuscitation  · Diet was advanced and patient tolerated  · Outpatient colonoscopy 6-8 weeks as outpt

## 2022-11-07 NOTE — QUICK NOTE
Patient was to be discharged however 1/2 blood cultures have now returned positive for Gram-positive rods  Given patient with colitis which could have resulted in translocation/bacteremia, will cancel discharge, follow-up on cultures and sensitivities prior to discharge

## 2022-11-07 NOTE — PLAN OF CARE
Problem: Potential for Falls  Goal: Patient will remain free of falls  Description: INTERVENTIONS:  - Educate patient/family on patient safety including physical limitations  - Instruct patient to call for assistance with activity   - Consult OT/PT to assist with strengthening/mobility   - Keep Call bell within reach  - Keep bed low and locked with side rails adjusted as appropriate  - Keep care items and personal belongings within reach  - Initiate and maintain comfort rounds  - Make Fall Risk Sign visible to staff  - Apply yellow socks and bracelet for high fall risk patients  - Consider moving patient to room near nurses station  Outcome: Progressing     Problem: Nutrition/Hydration-ADULT  Goal: Nutrient/Hydration intake appropriate for improving, restoring or maintaining nutritional needs  Description: Monitor and assess patient's nutrition/hydration status for malnutrition  Collaborate with interdisciplinary team and initiate plan and interventions as ordered  Monitor patient's weight and dietary intake as ordered or per policy  Utilize nutrition screening tool and intervene as necessary  Determine patient's food preferences and provide high-protein, high-caloric foods as appropriate       INTERVENTIONS:  - Monitor oral intake, urinary output, labs, and treatment plans  - Assess nutrition and hydration status and recommend course of action  - Evaluate amount of meals eaten  - Assist patient with eating if necessary   - Allow adequate time for meals  - Recommend/ encourage appropriate diets, oral nutritional supplements, and vitamin/mineral supplements  - Order, calculate, and assess calorie counts as needed  - Recommend, monitor, and adjust tube feedings and TPN/PPN based on assessed needs  - Assess need for intravenous fluids  - Provide specific nutrition/hydration education as appropriate  - Include patient/family/caregiver in decisions related to nutrition  Outcome: Progressing     Problem: INFECTION - ADULT  Goal: Absence or prevention of progression during hospitalization  Description: INTERVENTIONS:  - Assess and monitor for signs and symptoms of infection  - Monitor lab/diagnostic results  - Monitor all insertion sites, i e  indwelling lines, tubes, and drains  - Monitor endotracheal if appropriate and nasal secretions for changes in amount and color  - Glady appropriate cooling/warming therapies per order  - Administer medications as ordered  - Instruct and encourage patient and family to use good hand hygiene technique  - Identify and instruct in appropriate isolation precautions for identified infection/condition  Outcome: Progressing

## 2022-11-08 PROBLEM — R78.81 POSITIVE BLOOD CULTURE: Status: ACTIVE | Noted: 2022-11-08

## 2022-11-08 RX ADMIN — PANTOPRAZOLE SODIUM 40 MG: 40 TABLET, DELAYED RELEASE ORAL at 06:02

## 2022-11-08 RX ADMIN — METRONIDAZOLE 500 MG: 5 INJECTION, SOLUTION INTRAVENOUS at 08:52

## 2022-11-08 RX ADMIN — METRONIDAZOLE 500 MG: 5 INJECTION, SOLUTION INTRAVENOUS at 22:51

## 2022-11-08 RX ADMIN — METRONIDAZOLE 500 MG: 5 INJECTION, SOLUTION INTRAVENOUS at 17:11

## 2022-11-08 RX ADMIN — ATORVASTATIN CALCIUM 10 MG: 10 TABLET, FILM COATED ORAL at 08:51

## 2022-11-08 RX ADMIN — CEFTRIAXONE 1000 MG: 1 INJECTION, SOLUTION INTRAVENOUS at 21:55

## 2022-11-08 NOTE — PROGRESS NOTES
[facility]  Progress Note - Todd Wheeler 1945, 78 y o  female MRN: [id number]  Unit/Bed#: S -01 Encounter: [id number]  Primary Care Provider: Renetta Barahona MD   Date and time admitted to hospital: 11/5/2022  8:58 PM    * Colitis  Assessment & Plan  · Abdominal pain, diarrhea and BRBPR  · CT A/P shows severe diffuse wall thickening of the distal transverse and descending colon with surrounding fat stranding most compatible with nonspecific colitis; less likely diverticulitis   · With leukocytosis 14 75 on admission but no other septic criteria  White count improved to 11 35  · Stool enteric panel and C diff negative  · fecal calprotectin giardia pending  · Continue IV ceftriaxone and Flagyl  · Status post IV fluid resuscitation  · Diet was advanced and patient tolerated  · Outpatient colonoscopy 6-8 weeks as outpt    Positive blood culture  Assessment & Plan  · One blood culture is growing Gram-positive shashi  The other 1 continues to be negative  · Spoke to microbiologist, GPR will be further identified tomorrow  Thus not determined yet today if this is contaminant or a true pathogen  According to her, this may turn out to be anaerobic organism  · Continue IV antibiotics for now  · Follow-up results of the cultures  Increased endometrial stripe thickness  Assessment & Plan  · Postmenopausal endometrial thickening noted on CT   · Outpatient pelvic ultrasound     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  Assessment & Plan  · Continue protonix         VTE Pharmacologic Prophylaxis: VTE Score: 3 Moderate Risk (Score 3-4) - Pharmacological DVT Prophylaxis Contraindicated  Sequential Compression Devices Ordered  Patient Centered Rounds: I performed bedside rounds with nursing staff today  Discussions with Specialists or Other Care Team Provider:  Case management    Microbiologist   Gastroenterologist     Education and Discussions with Family / Patient: Patient declined call to   Time Spent for Care: 30 minutes  More than 50% of total time spent on counseling and coordination of care as described above  Current Length of Stay: 3 day(s)  Current Patient Status: Inpatient   Certification Statement: The patient will continue to require additional inpatient hospital stay due to Above findings and plans  Discharge Plan: Anticipate discharge tomorrow to home  Code Status: Level 1 - Full Code    Subjective:   Patient still having some loose stools, but not completely watery stools anymore and now with lesser frequency as compared to the previous days  Patient also denies any more blood in the stools  No nausea or vomiting  No abdominal pains  No other complaints  No shortness of breath  No fever  Objective:     Vitals:   Temp (24hrs), Av °F (36 7 °C), Min:97 6 °F (36 4 °C), Max:98 5 °F (36 9 °C)    Temp:  [97 6 °F (36 4 °C)-98 5 °F (36 9 °C)] 97 6 °F (36 4 °C)  HR:  [68-83] 68  Resp:  [16-18] 18  BP: (112-139)/(64-75) 139/75  SpO2:  [94 %-96 %] 96 %  Body mass index is 24 37 kg/m²  Input and Output Summary (last 24 hours): Intake/Output Summary (Last 24 hours) at 2022 1531  Last data filed at 2022  Gross per 24 hour   Intake 920 ml   Output --   Net 920 ml       Physical Exam:   Physical Exam  Vitals and nursing note reviewed  Constitutional:       General: She is not in acute distress  Appearance: She is not ill-appearing, toxic-appearing or diaphoretic  Cardiovascular:      Rate and Rhythm: Normal rate and regular rhythm  Heart sounds: Normal heart sounds  Pulmonary:      Effort: Pulmonary effort is normal  No respiratory distress  Breath sounds: Normal breath sounds  Abdominal:      General: Bowel sounds are normal  There is no distension  Palpations: Abdomen is soft  Tenderness: There is no abdominal tenderness  There is no guarding or rebound     Musculoskeletal:      Right lower leg: No edema  Left lower leg: No edema  Skin:     General: Skin is warm  Coloration: Skin is not pale  Findings: No erythema or rash  Neurological:      General: No focal deficit present  Mental Status: She is alert  Psychiatric:         Mood and Affect: Mood normal          Behavior: Behavior normal          Thought Content:  Thought content normal               Additional Data:     Labs:  Results from last 7 days   Lab Units 11/07/22  0513 11/06/22  1031   WBC Thousand/uL 11 35* 13 03*   HEMOGLOBIN g/dL 12 9 13 4   HEMATOCRIT % 39 9 40 9   PLATELETS Thousands/uL 283 265   NEUTROS PCT %  --  71   LYMPHS PCT %  --  16   MONOS PCT %  --  9   EOS PCT %  --  2     Results from last 7 days   Lab Units 11/07/22  0513 11/06/22  1031   SODIUM mmol/L 140 138   POTASSIUM mmol/L 3 5 3 7   CHLORIDE mmol/L 109* 107   CO2 mmol/L 24 23   BUN mg/dL 7 8   CREATININE mg/dL 0 63 0 51*   ANION GAP mmol/L 7 8   CALCIUM mg/dL 8 4 8 6   ALBUMIN g/dL  --  3 8   TOTAL BILIRUBIN mg/dL  --  0 87   ALK PHOS U/L  --  40   ALT U/L  --  18   AST U/L  --  15   GLUCOSE RANDOM mg/dL 94 96     Results from last 7 days   Lab Units 11/05/22  1930   INR  0 95             Results from last 7 days   Lab Units 11/05/22  1930   LACTIC ACID mmol/L 1 3       Lines/Drains:  Invasive Devices  Report    Peripheral Intravenous Line  Duration           Peripheral IV 11/08/22 Left Antecubital <1 day                      Imaging: Reviewed radiology reports from this admission including: abdominal/pelvic CT    Recent Cultures (last 7 days):   Results from last 7 days   Lab Units 11/06/22  0143 11/05/22  2359 11/05/22  2351 11/05/22  2233   BLOOD CULTURE   --  No Growth at 48 hrs   --   --    GRAM STAIN RESULT   --   --  Gram positive rods*  --    URINE CULTURE   --   --   --  30,000-39,000 cfu/ml    C DIFF TOXIN B BY PCR  Negative  --   --   --        Last 24 Hours Medication List:   Current Facility-Administered Medications   Medication Dose Route Frequency Provider Last Rate   • acetaminophen  650 mg Oral Q6H PRN Evonne Hernandez PA-C     • atorvastatin  10 mg Oral Daily Evonne Hernandez PA-C     • cefTRIAXone  1,000 mg Intravenous Q24H Evonne Hernandez PA-C 1,000 mg (11/07/22 2231)   • influenza vaccine  0 7 mL Intramuscular Once Margaret Padron DO     • metroNIDAZOLE  500 mg Intravenous Q8H Lupe MD Huan 500 mg (11/08/22 9113)   • ondansetron  4 mg Intravenous Q6H PRN Evonne Hernandez PA-C     • pantoprazole  40 mg Oral Early Morning Evonne Hernandez PA-C     • sodium chloride  75 mL/hr Intravenous Continuous Evonne Hernandez PA-C 75 mL/hr (11/07/22 2033)        Today, Patient Was Seen By: Shaggy Cano    **Please Note: This note may have been constructed using a voice recognition system  **

## 2022-11-08 NOTE — ASSESSMENT & PLAN NOTE
· Abdominal pain, diarrhea and BRBPR  · CT A/P shows severe diffuse wall thickening of the distal transverse and descending colon with surrounding fat stranding most compatible with nonspecific colitis; less likely diverticulitis   · With leukocytosis 14 75 on admission but no other septic criteria  White count improved to 11 35  · Stool enteric panel and C diff negative  · fecal calprotectin giardia pending  · Continue IV ceftriaxone and Flagyl      · Status post IV fluid resuscitation  · Diet was advanced and patient tolerated  · Outpatient colonoscopy 6-8 weeks as outpt

## 2022-11-08 NOTE — ASSESSMENT & PLAN NOTE
· One blood culture is growing Gram-positive shashi  The other 1 continues to be negative  · Spoke to microbiologist, GPR will be further identified tomorrow  Thus not determined yet today if this is contaminant or a true pathogen  According to her, this may turn out to be anaerobic organism  · Continue IV antibiotics for now  · Follow-up results of the cultures

## 2022-11-08 NOTE — PLAN OF CARE
Problem: Nutrition/Hydration-ADULT  Goal: Nutrient/Hydration intake appropriate for improving, restoring or maintaining nutritional needs  Description: Monitor and assess patient's nutrition/hydration status for malnutrition  Collaborate with interdisciplinary team and initiate plan and interventions as ordered  Monitor patient's weight and dietary intake as ordered or per policy  Utilize nutrition screening tool and intervene as necessary  Determine patient's food preferences and provide high-protein, high-caloric foods as appropriate       INTERVENTIONS:  - Monitor oral intake, urinary output, labs, and treatment plans  - Assess nutrition and hydration status and recommend course of action  - Evaluate amount of meals eaten  - Assist patient with eating if necessary   - Allow adequate time for meals  - Recommend/ encourage appropriate diets, oral nutritional supplements, and vitamin/mineral supplements  - Order, calculate, and assess calorie counts as needed  - Recommend, monitor, and adjust tube feedings and TPN/PPN based on assessed needs  - Assess need for intravenous fluids  - Provide specific nutrition/hydration education as appropriate  - Include patient/family/caregiver in decisions related to nutrition  Outcome: Progressing     Problem: INFECTION - ADULT  Goal: Absence or prevention of progression during hospitalization  Description: INTERVENTIONS:  - Assess and monitor for signs and symptoms of infection  - Monitor lab/diagnostic results  - Monitor all insertion sites, i e  indwelling lines, tubes, and drains  - Monitor endotracheal if appropriate and nasal secretions for changes in amount and color  - [place] appropriate cooling/warming therapies per order  - Administer medications as ordered  - Instruct and encourage patient and family to use good hand hygiene technique  - Identify and instruct in appropriate isolation precautions for identified infection/condition  Outcome: Progressing

## 2022-11-09 VITALS
SYSTOLIC BLOOD PRESSURE: 137 MMHG | BODY MASS INDEX: 24.44 KG/M2 | DIASTOLIC BLOOD PRESSURE: 81 MMHG | RESPIRATION RATE: 18 BRPM | HEART RATE: 78 BPM | OXYGEN SATURATION: 96 % | HEIGHT: 69 IN | TEMPERATURE: 98.3 F | WEIGHT: 165 LBS

## 2022-11-09 LAB
ALL TARGETS: NOT DETECTED
ANION GAP SERPL CALCULATED.3IONS-SCNC: 8 MMOL/L (ref 4–13)
BACTERIA BLD CULT: ABNORMAL
BUN SERPL-MCNC: 7 MG/DL (ref 5–25)
CALCIUM SERPL-MCNC: 9.1 MG/DL (ref 8.4–10.2)
CHLORIDE SERPL-SCNC: 109 MMOL/L (ref 96–108)
CO2 SERPL-SCNC: 25 MMOL/L (ref 21–32)
CREAT SERPL-MCNC: 0.65 MG/DL (ref 0.6–1.3)
ERYTHROCYTE [DISTWIDTH] IN BLOOD BY AUTOMATED COUNT: 12.3 % (ref 11.6–15.1)
GFR SERPL CREATININE-BSD FRML MDRD: 87 ML/MIN/1.73SQ M
GLUCOSE SERPL-MCNC: 96 MG/DL (ref 65–140)
GRAM STN SPEC: ABNORMAL
HCT VFR BLD AUTO: 41.8 % (ref 34.8–46.1)
HGB BLD-MCNC: 13.4 G/DL (ref 11.5–15.4)
MCH RBC QN AUTO: 33 PG (ref 26.8–34.3)
MCHC RBC AUTO-ENTMCNC: 32.1 G/DL (ref 31.4–37.4)
MCV RBC AUTO: 103 FL (ref 82–98)
PLATELET # BLD AUTO: 289 THOUSANDS/UL (ref 149–390)
PMV BLD AUTO: 9.3 FL (ref 8.9–12.7)
POTASSIUM SERPL-SCNC: 3.7 MMOL/L (ref 3.5–5.3)
RBC # BLD AUTO: 4.06 MILLION/UL (ref 3.81–5.12)
SODIUM SERPL-SCNC: 142 MMOL/L (ref 135–147)
WBC # BLD AUTO: 7.94 THOUSAND/UL (ref 4.31–10.16)

## 2022-11-09 RX ORDER — METRONIDAZOLE 500 MG/1
500 TABLET ORAL EVERY 8 HOURS SCHEDULED
Qty: 11 TABLET | Refills: 0 | Status: SHIPPED | OUTPATIENT
Start: 2022-11-09 | End: 2022-11-14 | Stop reason: ALTCHOICE

## 2022-11-09 RX ORDER — ACETAMINOPHEN 325 MG/1
650 TABLET ORAL EVERY 6 HOURS PRN
Refills: 0
Start: 2022-11-09

## 2022-11-09 RX ORDER — CEFDINIR 300 MG/1
300 CAPSULE ORAL EVERY 12 HOURS SCHEDULED
Qty: 8 CAPSULE | Refills: 0 | Status: SHIPPED | OUTPATIENT
Start: 2022-11-09 | End: 2022-11-14 | Stop reason: ALTCHOICE

## 2022-11-09 RX ADMIN — METRONIDAZOLE 500 MG: 5 INJECTION, SOLUTION INTRAVENOUS at 08:14

## 2022-11-09 RX ADMIN — ATORVASTATIN CALCIUM 10 MG: 10 TABLET, FILM COATED ORAL at 08:14

## 2022-11-09 RX ADMIN — PANTOPRAZOLE SODIUM 40 MG: 40 TABLET, DELAYED RELEASE ORAL at 05:34

## 2022-11-09 NOTE — PLAN OF CARE
Problem: Potential for Falls  Goal: Patient will remain free of falls  Description: INTERVENTIONS:  - Educate patient/family on patient safety including physical limitations  - Instruct patient to call for assistance with activity   - Consult OT/PT to assist with strengthening/mobility   - Keep Call bell within reach  - Keep bed low and locked with side rails adjusted as appropriate  - Keep care items and personal belongings within reach  - Initiate and maintain comfort rounds  - Make Fall Risk Sign visible to staff  - Offer Toileting every  Hours, in advance of need  - Initiate/Maintain alarm  - Obtain necessary fall risk management equipment  - Apply yellow socks and bracelet for high fall risk patients  - Consider moving patient to room near nurses station  Outcome: Adequate for Discharge     Problem: Nutrition/Hydration-ADULT  Goal: Nutrient/Hydration intake appropriate for improving, restoring or maintaining nutritional needs  Description: Monitor and assess patient's nutrition/hydration status for malnutrition  Collaborate with interdisciplinary team and initiate plan and interventions as ordered  Monitor patient's weight and dietary intake as ordered or per policy  Utilize nutrition screening tool and intervene as necessary  Determine patient's food preferences and provide high-protein, high-caloric foods as appropriate       INTERVENTIONS:  - Monitor oral intake, urinary output, labs, and treatment plans  - Assess nutrition and hydration status and recommend course of action  - Evaluate amount of meals eaten  - Assist patient with eating if necessary   - Allow adequate time for meals  - Recommend/ encourage appropriate diets, oral nutritional supplements, and vitamin/mineral supplements  - Order, calculate, and assess calorie counts as needed  - Recommend, monitor, and adjust tube feedings and TPN/PPN based on assessed needs  - Assess need for intravenous fluids  - Provide specific nutrition/hydration education as appropriate  - Include patient/family/caregiver in decisions related to nutrition  Outcome: Adequate for Discharge     Problem: INFECTION - ADULT  Goal: Absence or prevention of progression during hospitalization  Description: INTERVENTIONS:  - Assess and monitor for signs and symptoms of infection  - Monitor lab/diagnostic results  - Monitor all insertion sites, i e  indwelling lines, tubes, and drains  - Monitor endotracheal if appropriate and nasal secretions for changes in amount and color  - [place] appropriate cooling/warming therapies per order  - Administer medications as ordered  - Instruct and encourage patient and family to use good hand hygiene technique  - Identify and instruct in appropriate isolation precautions for identified infection/condition  Outcome: Adequate for Discharge

## 2022-11-09 NOTE — PLAN OF CARE
Problem: Potential for Falls  Goal: Patient will remain free of falls  Description: INTERVENTIONS:  - Educate patient/family on patient safety including physical limitations  - Instruct patient to call for assistance with activity   - Consult OT/PT to assist with strengthening/mobility   - Keep Call bell within reach  - Keep bed low and locked with side rails adjusted as appropriate  - Keep care items and personal belongings within reach  - Initiate and maintain comfort rounds  - Make Fall Risk Sign visible to staff  - Apply yellow socks and bracelet for high fall risk patients  - Consider moving patient to room near nurses station  Outcome: Progressing     Problem: Nutrition/Hydration-ADULT  Goal: Nutrient/Hydration intake appropriate for improving, restoring or maintaining nutritional needs  Description: Monitor and assess patient's nutrition/hydration status for malnutrition  Collaborate with interdisciplinary team and initiate plan and interventions as ordered  Monitor patient's weight and dietary intake as ordered or per policy  Utilize nutrition screening tool and intervene as necessary  Determine patient's food preferences and provide high-protein, high-caloric foods as appropriate       INTERVENTIONS:  - Monitor oral intake, urinary output, labs, and treatment plans  - Assess nutrition and hydration status and recommend course of action  - Evaluate amount of meals eaten  - Assist patient with eating if necessary   - Allow adequate time for meals  - Recommend/ encourage appropriate diets, oral nutritional supplements, and vitamin/mineral supplements  - Order, calculate, and assess calorie counts as needed  - Recommend, monitor, and adjust tube feedings and TPN/PPN based on assessed needs  - Assess need for intravenous fluids  - Provide specific nutrition/hydration education as appropriate  - Include patient/family/caregiver in decisions related to nutrition  Outcome: Progressing     Problem: INFECTION - ADULT  Goal: Absence or prevention of progression during hospitalization  Description: INTERVENTIONS:  - Assess and monitor for signs and symptoms of infection  - Monitor lab/diagnostic results  - Monitor all insertion sites, i e  indwelling lines, tubes, and drains  - Monitor endotracheal if appropriate and nasal secretions for changes in amount and color  - [place] appropriate cooling/warming therapies per order  - Administer medications as ordered  - Instruct and encourage patient and family to use good hand hygiene technique  - Identify and instruct in appropriate isolation precautions for identified infection/condition  Outcome: Progressing

## 2022-11-09 NOTE — CASE MANAGEMENT
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 2019      LOS (days): 4  Geometric Mean LOS (GMLOS) (days): 2 60  Days to GMLOS:-1     OBJECTIVE:    Risk of Unplanned Readmission Score: 6 83         Current admission status: Inpatient       Preferred Pharmacy:   [address]  [address] [place] [id number]  Phone: [phone] Fax: [phone]    Primary Care Provider: Dread Mendez MD    Primary Insurance: Evelyne Kehr [facility] RE[facility]  Secondary Insurance:     ASSESSMENT:  Daph[address] 26 Proxies    There are no active Health Care Proxies on file                   Readmission Root Cause  30 Day Readmission: No    Patient Information  Admitted from[de-identified] Home  Mental Status: Alert  During Assessment patient was accompanied by: Not accompanied during assessment  Assessment information provided by[de-identified] Patient  Primary Caregiver: Self  Support Systems: Self, Spouse/significant other, Family members  [place] of Residence: [address] [place] [address] do you live in?: [address] [facility] of Transport to [facility][de-identified] Drives Self  In the past 12 months, has lack of transportation kept you from medical appointments or from getting medications?: No  In the past 12 months, has lack of transportation kept you from meetings, work, or from getting things needed for daily living?: No  Was application for public transport provided?: N/A        DISCHARGE DETAILS:    Discharge planning discussed with[de-identified] Patient  Freedom of Choice: Yes  Comments - Freedom of Choice: No CM DC needs were discussed or identified  CM contacted family/caregiver?: No- see comments (Patient reported her  was here but had stepped out of the room)  Were Treatment Team discharge recommendations reviewed with patient/caregiver?: Yes  Did patient/caregiver verbalize understanding of patient care needs?: Yes  Were patient/caregiver advised of the risks associated with not following Treatment Team discharge recommendations?: Yes    Contacts  Patient Contacts: Patient  Relationship to Patient[de-identified] Other (Comment)  Contact Method: In Person  Reason/Outcome: Discharge [address]         Is the patient interested in [facility] at discharge?: No    DME Referral Provided  Referral made for DME?: No                                                  IMM Given (Date):: 11/09/22  IMM Given to[de-identified] Patient                      IMM reviewed with patient  Patient agrees with discharge determination  No CM DC needs were discussed or identified  CM confirmed with surgery team and primary nurse no needs identified on their end either  Patient's  is here and will transport her home if cleared for DC

## 2022-11-09 NOTE — DISCHARGE SUMMARY
[facility]  Discharge- Duanne Nose 1945, 78 y o  female MRN: [id number]  Unit/Bed#: S -01 Encounter: [id number]  Primary Care Provider: Devin Bowling MD   Date and time admitted to hospital: 11/5/2022  8:58 PM    * Colitis  Assessment & Plan  · Clinically improved  · Abdominal pain, diarrhea and BRBPR  All resolved  · CT A/P shows severe diffuse wall thickening of the distal transverse and descending colon with surrounding fat stranding most compatible with nonspecific colitis; less likely diverticulitis   · With leukocytosis 14 75 on admission but no other septic criteria  White count improved back to normal levels  · Stool enteric panel and C diff negative  · fecal calprotectin and giardia pending  · Status post IV ceftriaxone and Flagyl for 3 days here  On discharge, patient was prescribed with cefdinir and Flagyl for 4 more days to complete 1 week antibiotic treatment, as per recommendations from the gastroenterologist   · Status post IV fluid resuscitation  · Diet was advanced and patient tolerated  · Outpatient colonoscopy in the outpatient  According to the patient, she will follow-up with her own gastroenterologist, Dr Paris Dominguez for this  Positive blood culture  Assessment & Plan  · One blood culture is growing Gram-positive shashi with Microbacterium oleivorans  The other 1 continues to be negative for 72 hours now  · Spoke to microbiologist about this, and likely a contaminant  · Spoke also to Infectious Disease doctor, Dr Wendy Brooke, and discuss the case with her  From our discussion, the 1 positive blood culture likely a contaminant  · Patient had a low-grade fever earlier this morning, however succeeding temperatures were normal   According to the patient, the temperature this morning was taken orally while she was having breakfast and her food was warm, thus, the mildly elevated temperature may be due to that    · Status post IV antibiotics  · Follow-up succeeding and final results of the cultures  · On discharge, per our recommendation from the gastroenterologist, patient was prescribed with oral cefdinir and Flagyl to complete 1 week diabetic treatment as treatment for patient's colitis  · Outpatient follow-up with primary care physician  Increased endometrial stripe thickness  Assessment & Plan  · Incidental finding  · This was discussed with the patient during this admission  · Postmenopausal endometrial thickening noted on CT   · Outpatient pelvic ultrasound, may be facilitated by patient's primary care physician  Outpatient follow-up with PCP  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  Assessment & Plan  · Continue protonix   · Outpatient follow-up with primary care physician  Discharging Physician / Practitioner: Toyin Cano  PCP: Beto Fisher MD  Admission Date:   Admission Orders (From admission, onward)     Ordered        11/05/22 2345  1 [facility]                      Discharge Date: 11/09/22    Consultations During Hospital Stay:  · Gastroenterologist     Procedures Performed:   · Please see diagnosis and notes above  Significant Findings / Test Results:   · Please see diagnosis and notes above  Incidental Findings:   · Thickened endometrium  Outpatient follow-up with primary care physician  Radiologist is recommending a pelvic ultrasound  Primary care physician may facilitate this  Test Results Pending at Discharge (will require follow up):   · Succeeding and final results of the blood cultures  Primary care physician may facilitate this  · Fecal Giardia lamblia EIA and ova and parasites as well as fecal calprotectin:  Patient's primary care physician and/or gastroenterologist may follow-up results  Outpatient Tests Requested:  · None  However, patient will need a colonoscopy a few weeks from now    This will be done by patient's gastroenterologist     Complications:  None  Reason for Admission:  Abdominal pain and bright red blood per rectum  Hospital Course:   Colt Galan is a 78 y o  female patient who originally presented to the hospital on 11/5/2022 due to abdominal pain and bright red blood per rectum  Gastroenterologist was on board  Patient was found to have colitis on the CT scan  Patient was on IV fluids  Patient was on IV ceftriaxone and IV Flagyl here  Patient's condition improved  Patient did not have any abdominal pains or any bright red blood per rectum anymore  Today, patient's stools now more formed, with no blood  On discharge, patient was prescribed with oral cefdinir and oral Flagyl for 4 more days to complete 1 week treatment as per recommendation by the GI doctor  Outpatient follow-up with primary care physician and gastroenterologist   Patient will need colonoscopy a few weeks from now  Please see above list of diagnoses and related plan for additional information  Condition at Discharge: stable    Discharge Day Visit / Exam:   Subjective:    Patient was doing fine and better  Patient denies any more abdominal pains or any rectal bleeding or any blood in the stools  No more diarrhea as patient's stools are now more formed  No nausea or vomiting  No shortness of breath  No other pains  Patient is okay with her discharge to home today  Vitals: Blood Pressure: 137/81 (11/09/22 1413)  Pulse: 78 (11/09/22 1413)  Temperature: 98 3 °F (36 8 °C) (11/09/22 1413)  Temp Source: Oral (11/06/22 1533)  Respirations: 18 (11/09/22 0724)  Height: 5' 9" (175 3 cm) (11/05/22 1930)  Weight - Scale: 74 8 kg (165 lb) (11/05/22 1930)  SpO2: 96 % (11/09/22 1413)  Exam:   Physical Exam  Vitals and nursing note reviewed  Constitutional:       General: She is not in acute distress  Appearance: She is not ill-appearing, toxic-appearing or diaphoretic     Cardiovascular:      Rate and Rhythm: Normal rate and regular rhythm  Heart sounds: Normal heart sounds  Pulmonary:      Effort: Pulmonary effort is normal  No respiratory distress  Breath sounds: Normal breath sounds  Abdominal:      General: Bowel sounds are normal  There is no distension  Palpations: Abdomen is soft  Tenderness: There is no abdominal tenderness  There is no guarding or rebound  Musculoskeletal:      Right lower leg: No edema  Left lower leg: No edema  Skin:     General: Skin is warm  Coloration: Skin is not pale  Findings: No erythema or rash  Neurological:      General: No focal deficit present  Mental Status: She is alert and oriented to person, place, and time  Psychiatric:         Mood and Affect: Mood normal          Behavior: Behavior normal          Thought Content: Thought content normal             Discussion with Family: Updated  () at bedside  Discharge instructions/Information to patient and family:   See after visit summary for information provided to patient and family  Provisions for Follow-Up Care:  See after visit summary for information related to follow-up care and any pertinent home health orders  Disposition:   Home    Planned Readmission:  None  Discharge Statement:  I spent 45 minutes discharging the patient  This time was spent on the day of discharge  I had direct contact with the patient on the day of discharge  Greater than 50% of the total time was spent examining patient, answering all patient questions, arranging and discussing plan of care with patient as well as directly providing post-discharge instructions  Additional time then spent on discharge activities  Discharge Medications:  See after visit summary for reconciled discharge medications provided to patient and/or family        **Please Note: This note may have been constructed using a voice recognition system**

## 2022-11-10 ENCOUNTER — TRANSITIONAL CARE MANAGEMENT (OUTPATIENT)
Dept: FAMILY MEDICINE CLINIC | Facility: CLINIC | Age: 77
End: 2022-11-10

## 2022-11-10 LAB — CALPROTECTIN STL-MCNT: 735 UG/G (ref 0–120)

## 2022-11-10 NOTE — ASSESSMENT & PLAN NOTE
· Incidental finding  · This was discussed with the patient during this admission  · Postmenopausal endometrial thickening noted on CT   · Outpatient pelvic ultrasound, may be facilitated by patient's primary care physician  Outpatient follow-up with PCP

## 2022-11-10 NOTE — UTILIZATION REVIEW
NOTIFICATION OF ADMISSION DISCHARGE   This is a Notification of Discharge from [facility]  Please be advised that this patient has been discharge from our facility  Below you will find the admission and discharge date and time including the patient’s disposition  UTILIZATION REVIEW CONTACT:  Vincent Su  Utilization   Network Utilization Review Department  Phone: [phone] x carefully listen to the prompts  All voicemails are confidential   Email: [email or web address]     ADMISSION INFORMATION  PRESENTATION DATE: 11/5/2022  8:58 PM  OBERVATION ADMISSION DATE:   INPATIENT ADMISSION DATE: 11/5/22 11:46 PM   DISCHARGE DATE: 11/9/2022  4:01 PM  DISPOSITION: Home/Self Care Home/Self Care      IMPORTANT INFORMATION:  Send all requests for admission clinical reviews, approved or denied determinations and any other requests to dedicated fax number below belonging to the campus where the patient is receiving treatment   List of dedicated fax numbers:  [address] DENIALS (Administrative/Medical Necessity) [phone]   [address] (Maternity/NICU/Pediatrics) [phone]   [facility] [phone]   [place] [address] [phone]   [address] [phone]   220 [facility] [phone] 90 [facility] [phone]   [address] [place], [place] [address] [phone]   [facility]  [phone] 4058 [facility] [phone]   412 [facility] [address] [facility] [phone]

## 2022-11-10 NOTE — ASSESSMENT & PLAN NOTE
· Clinically improved  · Abdominal pain, diarrhea and BRBPR  All resolved  · CT A/P shows severe diffuse wall thickening of the distal transverse and descending colon with surrounding fat stranding most compatible with nonspecific colitis; less likely diverticulitis   · With leukocytosis 14 75 on admission but no other septic criteria  White count improved back to normal levels  · Stool enteric panel and C diff negative  · fecal calprotectin and giardia pending  · Status post IV ceftriaxone and Flagyl for 3 days here  On discharge, patient was prescribed with cefdinir and Flagyl for 4 more days to complete 1 week antibiotic treatment, as per recommendations from the gastroenterologist   · Status post IV fluid resuscitation  · Diet was advanced and patient tolerated  · Outpatient colonoscopy in the outpatient  According to the patient, she will follow-up with her own gastroenterologist, Dr Violette Stauffer for this

## 2022-11-10 NOTE — ASSESSMENT & PLAN NOTE
· One blood culture is growing Gram-positive shashi with Microbacterium oleivorans  The other 1 continues to be negative for 72 hours now  · Spoke to microbiologist about this, and likely a contaminant  · Spoke also to Infectious Disease doctor, Dr Eliceo Tanner, and discuss the case with her  From our discussion, the 1 positive blood culture likely a contaminant  · Patient had a low-grade fever earlier this morning, however succeeding temperatures were normal   According to the patient, the temperature this morning was taken orally while she was having breakfast and her food was warm, thus, the mildly elevated temperature may be due to that  · Status post IV antibiotics  · Follow-up succeeding and final results of the cultures  · On discharge, per our recommendation from the gastroenterologist, patient was prescribed with oral cefdinir and Flagyl to complete 1 week diabetic treatment as treatment for patient's colitis  · Outpatient follow-up with primary care physician

## 2022-11-11 ENCOUNTER — RA CDI HCC (OUTPATIENT)
Dept: OTHER | Facility: HOSPITAL | Age: 77
End: 2022-11-11

## 2022-11-11 LAB — BACTERIA BLD CULT: NORMAL

## 2022-11-11 NOTE — PROGRESS NOTES
[facility] 75  coding opportunities       Chart reviewed, no opportunity found:   [address]        Patients Insurance     Medicare Insurance: [employer] Advantage

## 2022-11-14 ENCOUNTER — OFFICE VISIT (OUTPATIENT)
Dept: FAMILY MEDICINE CLINIC | Facility: CLINIC | Age: 77
End: 2022-11-14

## 2022-11-14 VITALS
HEIGHT: 69 IN | RESPIRATION RATE: 16 BRPM | OXYGEN SATURATION: 97 % | SYSTOLIC BLOOD PRESSURE: 110 MMHG | WEIGHT: 168.2 LBS | DIASTOLIC BLOOD PRESSURE: 80 MMHG | HEART RATE: 105 BPM | TEMPERATURE: 98.5 F | BODY MASS INDEX: 24.91 KG/M2

## 2022-11-14 DIAGNOSIS — K22.70 BARRETT'S ESOPHAGUS WITHOUT DYSPLASIA: ICD-10-CM

## 2022-11-14 DIAGNOSIS — K52.9 COLITIS: ICD-10-CM

## 2022-11-14 DIAGNOSIS — Z23 FLU VACCINE NEED: ICD-10-CM

## 2022-11-14 DIAGNOSIS — R93.89 INCREASED ENDOMETRIAL STRIPE THICKNESS: ICD-10-CM

## 2022-11-14 DIAGNOSIS — Z09 HOSPITAL DISCHARGE FOLLOW-UP: Primary | ICD-10-CM

## 2022-11-14 PROBLEM — R78.81 POSITIVE BLOOD CULTURE: Status: RESOLVED | Noted: 2022-11-08 | Resolved: 2022-11-14

## 2022-11-14 NOTE — PROGRESS NOTES
Assessment & Plan     1  Hospital discharge follow-up    2  Colitis  Comments:  negative blood culture    Assessment & Plan:  Symptoms resolved  Finished her course of cefdinir and flagyl   To f/u with dr Marita Nuñez for colonoscopy      3  Increased endometrial stripe thickness  Comments:  found on CT scan   no vaginal bleeding   to get Ultrasound pelvis  Orders:  -     US pelvis complete non OB; Future; Expected date: 11/14/2022    4  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  Assessment & Plan:  EGD last year was overall normal   Continue pantoprazole as directed      5  Flu vaccine need  -     influenza vaccine, high-dose, PF 0 7 mL (FLUZONE HIGH-DOSE)         Subjective     Transitional Care Management Review:   Lashanda England is a 78 y o  female here for TCM follow up  During the TCM phone call patient stated:  TCM Call     Date and time call was made  11/10/2022 11:23 AM    Patient was hospitialized at  [address] [place] [address]    Date of Admission  11/05/22    Date of discharge  11/09/22    Diagnosis  colitis    Disposition  Home    Were the patients medications reviewed and updated  No      TCM Call     Should patient be enrolled in anticoag monitoring? Yes    Scheduled for follow up?   Yes    Did you obtain your prescribed medications  Yes    Do you need help managing your prescriptions or medications  No    Is transportation to your appointment needed  No    I have advised the patient to call PCP with any new or worsening symptoms  Sangeetha Mon,     Living Arrangements  Spouse or Significiant other    Are you recieving any outpatient services  No    Are you recieving home care services  No    Are you using any community resources  No    Current waiver services  No    Have you fallen in the last 12 months  No    Interperter language line needed  No        HPI   Here for follow up  Was admitted for colitis on 11/5/22   She wasGiven  IV ceftriaxone and Flagyl for 3 days in the hospital     discharged with cefdinir and Flagyl for 4 more days to complete 1 week antibiotic treatment  Today was the last day of antibiotics  Patient is feeling well overall  Having normal BMs    Review of Systems   Constitutional: Negative  HENT: Negative  Respiratory: Negative  Cardiovascular: Negative  Endocrine: Negative  Genitourinary: Negative  Musculoskeletal: Negative  Allergic/Immunologic: Negative  Neurological: Negative  Hematological: Negative  Psychiatric/Behavioral: Negative  Objective     /80 (BP Location: Left arm, Patient Position: Sitting, Cuff Size: Adult)   Pulse 105   Temp 98 5 °F (36 9 °C) (Tympanic)   Resp 16   Ht 5' 9" (1 753 m)   Wt 76 3 kg (168 lb 3 2 oz)   SpO2 97%   BMI 24 84 kg/m²      Physical Exam  Vitals and nursing note reviewed  Constitutional:       Appearance: Normal appearance  HENT:      Head: Normocephalic and atraumatic  Right Ear: Tympanic membrane normal       Left Ear: Tympanic membrane normal       Nose: Nose normal    Cardiovascular:      Rate and Rhythm: Normal rate and regular rhythm  Pulses: Normal pulses  Heart sounds: Normal heart sounds  Pulmonary:      Effort: Pulmonary effort is normal       Breath sounds: Normal breath sounds  Abdominal:      General: Abdomen is flat  Musculoskeletal:         General: No swelling or tenderness  Normal range of motion  Cervical back: Normal range of motion and neck supple  Neurological:      General: No focal deficit present  Mental Status: She is alert and oriented to person, place, and time     Psychiatric:         Mood and Affect: Mood normal          Behavior: Behavior normal        Ynes Conley MD

## 2022-11-14 NOTE — ASSESSMENT & PLAN NOTE
Symptoms resolved  Finished her course of cefdinir and flagyl   To f/u with dr Violet Dancer for colonoscopy

## 2022-11-15 LAB
G LAMBLIA AG STL QL IA: NEGATIVE
O+P STL CONC: NORMAL

## 2022-12-02 ENCOUNTER — HOSPITAL ENCOUNTER (OUTPATIENT)
Dept: ULTRASOUND IMAGING | Facility: HOSPITAL | Age: 77
Discharge: HOME | End: 2022-12-02

## 2022-12-02 DIAGNOSIS — R93.89 INCREASED ENDOMETRIAL STRIPE THICKNESS: Primary | ICD-10-CM

## 2022-12-02 DIAGNOSIS — R93.89 INCREASED ENDOMETRIAL STRIPE THICKNESS: ICD-10-CM

## 2022-12-05 ENCOUNTER — TELEPHONE (OUTPATIENT)
Dept: FAMILY MEDICINE CLINIC | Facility: CLINIC | Age: 77
End: 2022-12-05

## 2022-12-05 NOTE — TELEPHONE ENCOUNTER
----- Message from Spring Gray sent at 12/2/2022  3:24 PM EST -----  Regarding: RE: Significant findings on an US  I put in a referral for her to gyn, she will have to call to schedule this   ----- Message -----  From: Parvin Cleveland  Sent: 12/2/2022   2:22 PM EST  To: YANET Licona  Subject: RE: Significant findings on an [place] A the patient and she does not have a gyn provider  Please advise next step  ----- Message -----  From: YANET Licona  Sent: 12/2/2022  12:41 PM EST  To: [facility]  Subject: FW: Significant findings on an US                Can we please find out if pt has a gynecologist?  She had some abnormalities on the ultrasound Dr Eloy Winters ordered that require follow up with a gynecologist     Thanks  ----- Message -----  From: Darwin Goodwin  Sent: 12/2/2022  12:15 PM EST  To: YANET Licona  Subject: Significant findings on an [place] from radiology called and there are significant findings on the US pelvis complete w transvaginal  Dr Eloy Winters ordered it but she is not back till Thursday, if you can take a look at it         Thank you,     Richard Carreno

## 2023-01-10 ENCOUNTER — OFFICE VISIT (OUTPATIENT)
Dept: OBGYN CLINIC | Facility: CLINIC | Age: 78
End: 2023-01-10

## 2023-01-10 VITALS
BODY MASS INDEX: 25.16 KG/M2 | WEIGHT: 166 LBS | DIASTOLIC BLOOD PRESSURE: 60 MMHG | HEIGHT: 68 IN | SYSTOLIC BLOOD PRESSURE: 130 MMHG

## 2023-01-10 DIAGNOSIS — R93.89 INCREASED ENDOMETRIAL STRIPE THICKNESS: ICD-10-CM

## 2023-01-10 DIAGNOSIS — R93.5 ABNORMAL ULTRASOUND OF UTERUS: Primary | ICD-10-CM

## 2023-01-10 DIAGNOSIS — D39.12 NEOPLASM OF UNCERTAIN BEHAVIOR OF OVARY, LEFT: ICD-10-CM

## 2023-01-10 NOTE — LETTER
January 10, 2023     Vipul Salazar MD  [address] [facility] [place]  [address] [address]    Patient: Jennifer Hong   YOB: 1945   Date of Visit: 1/10/2023       Dear Dr Maximo Harper:    Thank you for referring Felisa Sutton to me for evaluation  Below are my notes for this consultation  If you have questions, please do not hesitate to call me  I look forward to following your patient along with you  She has an appointment for endometrial biopsy on February 2 and will be going for a  level  If any of     these are abnormal she will be referred to GYN oncology  That would make going for an MRI a moot point  Sincerely,        Hernan Worthington MD        CC: No Recipients  Hernan Worthington MD  1/10/2023  3:04 PM  Incomplete  Assessment/Plan: Thickened endometrial stripe at 16 mm with cystic change  Complex left adnexa mass    US  1  Abnormally thickened endometrial stripe with cystic change concerning for hyperplasia or neoplasm  2   Complex multilocular cyst in the left adnexa  O - RADS category: 4 -intermediate risk  Referral for MRI study  May be managed by gynecologist with gynecologic oncologist consultation  3   Calcified myoma  Recommend  and endometrial biopsy which will help guide future care  Discussed MRI of the pelvis, GYN oncology referral, and possible ultrasound in 3 months if the  level and endometrial biopsy were normal   If either of these studies are abnormal then additional imaging is not necessary and she will be referred to GYN oncology  She asked if a hysterectomy at this point would be indicated  The GYN oncology referral could be made today or after the studies return  They would at least want to know the results of the endometrial sampling because that would effect the planned surgery  If adenocarcinoma is present then lymph node sampling would be performed    If the surgery is done primarily for ovarian concerns lymph node sampling would be planned from the outset  Endometrial biopsy was fully explained including the potential risks which include infection, uterine perforation, syncope, and insufficient sampling due to cervical stenosis, pain, and/or inadequate tissue  Diagnoses and all orders for this visit:    Abnormal ultrasound of uterus    Increased endometrial stripe thickness  -     Ambulatory Referral to Gynecology    Neoplasm of uncertain behavior of ovary, left  -     ; Future             Subjective:       Patient ID: Susana Pack is a 78 y o  female  Mrs Francine Sy is seen in consultation for an abnormal CAT scan and ultrasound performed while hospitalized in November     In November she developed painful bowel movements that were associated with heavy rectal bleeding  She was admitted and placed on IV antibiotics after diagnosis of colitis was made  She felt much better after 24 hours of antibiotics  She remained in the hospital for total 4 days  A CAT scan performed 11/5 revealed colitis of the transverse and descending colon  There was also a suggestion of endometrial thickening and a nonurgent outpatient ultrasound was recommended  This was performed on December 2  It revealed a small uterus with a 3 6 cm calcified left fundal intramural fibroid  The endometrial stripe was 16 mm  There was a Complex multilocular cyst present in the left adnexa measuring 2 4 x 1 8 x 1 4 cm  It contained multiple thin internal septations with slightly irregular borders  Currently she is complaining of a "pinching" pain in the left lower lateral abdomen that occurs intermittently  It does not require analgesia  It is not associated with activity or eating  On January 4 she had a colonoscopy which revealed a single polyp  The following portions of the patient's history were reviewed and updated as appropriate: She  has a past medical history of Arthritis, Endometriosis, and GERD (gastroesophageal reflux disease)    Patient Active Problem List    Diagnosis Date Noted   • Abnormal ultrasound of uterus 01/10/2023   • Neoplasm of uncertain behavior of ovary, 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   • Colitis 11/06/2022   • Increased endometrial stripe thickness 11/06/2022   • 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 01/31/2019   • Irritable bowel syndrome 01/07/2016   PMH:  Menarche 17  G0  Primary Infertility '82  Dx Laparoscopy - Endometriosis '83  Dysmenorrhea  Menopause 54 - no HRT, No PMB  Dyspareunia 58 - no vaginal estrogen  IBS  GERD '21  Armando's Esophagus w/o Dysplasia - done q 3 yrs  IBS  Arthritis  Hypercholesterolemia  Colitis of distal transverse and descending colon 11/22  She  has a past surgical history that includes Tonsillectomy; Colpopexy; Blepharoptosis repair (Bilateral); and Colonoscopy (01/04/2023)  Her family history includes Atrial fibrillation in her brother and mother; Breast cancer in her paternal aunt; Dementia in her mother; Heart failure in her mother; Pancreatic cancer in her father; Sleep apnea in her brother  FH:  F - Pancreatic Ca d 79 a few months p Dx  MGF - Stomach Ca d  PGF - Ca  M -arrhythmia d 80  Bro -arrhythmia  She  reports that she has quit smoking  Her smoking use included cigarettes  She has never used smokeless tobacco  She reports current alcohol use of about 7 0 standard drinks per week  She reports that she does not use drugs  SH:   to [place] '70  She is a retired  and   She retired at age 63  Adopted daughter '85 who is a farmer in [place]  Current Outpatient Medications   Medication Sig Dispense Refill   • atorvastatin (LIPITOR) 10 mg tablet TAKE 1 TABLET BY MOUTH EVERY DAY 90 tablet 3   • B Complex Vitamins (VITAMIN B COMPLEX 100 IJ)      • Calcium-Magnesium 500-250 MG TABS Take by mouth Take one tablet by mouth daily       • cholecalciferol (VITAMIN D3) 1,000 units tablet Take 1,000 Units by mouth daily     • Omega-3 Fatty Acids (OMEGA 3 PO) Take by mouth Patient takes 2 tablets daily     • pantoprazole (PROTONIX) 40 mg tablet Take by mouth daily       • Probiotic Product (PROBIOTIC PO) Take by mouth Take one tablet by mouth daily  • acetaminophen (TYLENOL) 325 mg tablet Take 2 tablets (650 mg total) by mouth every 6 (six) hours as needed for mild pain, headaches or fever (Patient not taking: Reported on 11/14/2022)  0   • aspirin (ECOTRIN LOW STRENGTH) 81 mg EC tablet Take 81 mg by mouth daily (Patient not taking: Reported on 1/10/2023)       No current facility-administered medications for this visit  Current Outpatient Medications on File Prior to Visit   Medication Sig   • atorvastatin (LIPITOR) 10 mg tablet TAKE 1 TABLET BY MOUTH EVERY DAY   • B Complex Vitamins (VITAMIN B COMPLEX 100 IJ)    • Calcium-Magnesium 500-250 MG TABS Take by mouth Take one tablet by mouth daily  • cholecalciferol (VITAMIN D3) 1,000 units tablet Take 1,000 Units by mouth daily   • Omega-3 Fatty Acids (OMEGA 3 PO) Take by mouth Patient takes 2 tablets daily   • pantoprazole (PROTONIX) 40 mg tablet Take by mouth daily     • Probiotic Product (PROBIOTIC PO) Take by mouth Take one tablet by mouth daily  • acetaminophen (TYLENOL) 325 mg tablet Take 2 tablets (650 mg total) by mouth every 6 (six) hours as needed for mild pain, headaches or fever (Patient not taking: Reported on 11/14/2022)   • aspirin (ECOTRIN LOW STRENGTH) 81 mg EC tablet Take 81 mg by mouth daily (Patient not taking: Reported on 1/10/2023)     No current facility-administered medications on file prior to visit  She is allergic to seasonal ic [cholestatin]       Review of Systems   Constitutional: Negative for activity change, appetite change, fatigue and unexpected weight change  Eyes: Negative for visual disturbance  Respiratory: Negative for cough, chest tightness, shortness of breath and wheezing  Cardiovascular: Negative for chest pain, palpitations and leg swelling          Breast: Patient denies tenderness, nipple discharge, masses, or erythema  Gastrointestinal: Positive for diarrhea  Negative for abdominal distention, abdominal pain, blood in stool, constipation, nausea and vomiting  Rectal urgency once a month  Endocrine: Negative for cold intolerance and heat intolerance  Genitourinary: Negative for decreased urine volume, difficulty urinating, dysuria, frequency, hematuria, menstrual problem, pelvic pain, urgency, vaginal bleeding, vaginal discharge and vaginal pain  She has not been sexually active for 10 years  Musculoskeletal: Negative for arthralgias  Skin: Negative for rash  Neurological: Negative for weakness, light-headedness, numbness and headaches  Hematological: Does not bruise/bleed easily  Psychiatric/Behavioral: Negative for agitation, behavioral problems and sleep disturbance  The patient is not nervous/anxious  Objective:    Vitals:    01/10/23 1248   BP: 130/60   BP Location: Left arm   Patient Position: Sitting   Cuff Size: Standard   Weight: 75 3 kg (166 lb)   Height: 5' 7 5" (1 715 m)           Physical Exam  Vitals and nursing note reviewed  Exam conducted with a chaperone present  Constitutional:       General: She is not in acute distress  Appearance: She is well-developed  HENT:      Head: Normocephalic and atraumatic  Eyes:      General: No scleral icterus  Right eye: No discharge  Left eye: No discharge  Extraocular Movements: Extraocular movements intact  Conjunctiva/sclera: Conjunctivae normal    Neck:      Thyroid: No thyromegaly  Trachea: No tracheal deviation  Cardiovascular:      Rate and Rhythm: Normal rate and regular rhythm  Heart sounds: Normal heart sounds  No murmur heard  Pulmonary:      Effort: Pulmonary effort is normal  No respiratory distress  Breath sounds: Normal breath sounds  No wheezing     Chest:   Breasts:     Breasts are symmetrical       Right: No inverted nipple, mass, nipple discharge, skin change or tenderness  Left: No inverted nipple, mass, nipple discharge, skin change or tenderness  Abdominal:      General: Abdomen is flat  Bowel sounds are normal  There is no distension  Palpations: Abdomen is soft  There is no mass  Tenderness: There is no abdominal tenderness  There is no right CVA tenderness, left CVA tenderness, guarding or rebound  Hernia: No hernia is present  Genitourinary:     General: Normal vulva  Labia:         Right: No rash, tenderness or lesion  Left: No rash, tenderness or lesion  Vagina: Normal       Cervix: No cervical motion tenderness or discharge  Uterus: Not deviated, not enlarged and not tender  Adnexa:         Right: No mass, tenderness or fullness  Left: No mass, tenderness or fullness  Rectum: No external hemorrhoid  Comments: Urethral meatus within normal limits  Perineum within normal limits  Bladder well supported  Physiologic vaginal atrophy  Nulliparous cervix  Uterus is mobile anteverted and normal size  I cannot appreciate the fibroid  Musculoskeletal:         General: No tenderness  Normal range of motion  Cervical back: Normal range of motion and neck supple  Lymphadenopathy:      Cervical: No cervical adenopathy  Skin:     General: Skin is warm and dry  Neurological:      Mental Status: She is alert and oriented to person, place, and time  Psychiatric:         Mood and Affect: Mood normal          Behavior: Behavior normal          Thought Content: Thought content normal          Judgment: Judgment normal           Haleigh Elizondo MD  1/10/2023  1:48 PM  Sign when Signing Visit  Assessment/Plan:    1  Abnormally thickened endometrial stripe with cystic change concerning for hyperplasia or neoplasm  2   Complex multilocular cyst in the left adnexa  O - RADS category: 4 -intermediate risk  Referral for MRI study    May be managed by gynecologist with gynecologic oncologist consultation  3   Calcified myoma  Diagnoses and all orders for this visit:    Abnormal ultrasound of uterus    Increased endometrial stripe thickness  -     Ambulatory Referral to Gynecology    Neoplasm of uncertain behavior of ovary, left  -     ; Future             Subjective:       Patient ID: Sacnho Conti is a 78 y o  female  HPI    The following portions of the patient's history were reviewed and updated as appropriate: She  has a past medical history of Arthritis, Endometriosis, and GERD (gastroesophageal reflux disease)  Patient Active Problem List    Diagnosis Date Noted   • Abnormal ultrasound of uterus 01/10/2023   • Neoplasm of uncertain behavior of ovary, 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   • Colitis 11/06/2022   • Increased endometrial stripe thickness 11/06/2022   • 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 01/31/2019   • Irritable bowel syndrome 01/07/2016   PMH:  Menarche 17  G0  Primary Infertility '82  Dx Laparoscopy - Endometriosis '83  Dysmenorrhea  Menopause 54 - no HRT, No PMB  Dyspareunia 58 - no vaginal estrogen  GERD '21  Armando's Esophagus w/o Dysplasia - done q 3 yrs  IBS  Arthritis  Hypercholesterolemia  Colitis of distal transverse and descending colon 11/22  She  has a past surgical history that includes Tonsillectomy; Colpopexy; and Blepharoptosis repair (Bilateral)  Her family history includes Atrial fibrillation in her brother and mother; Breast cancer in her paternal aunt; Dementia in her mother; Heart failure in her mother; Pancreatic cancer in her father; Sleep apnea in her brother  FH:    She  reports that she has quit smoking  Her smoking use included cigarettes  She has never used smokeless tobacco  She reports current alcohol use of about 7 0 standard drinks per week  She reports that she does not use drugs     SH:   to [place]  Current Outpatient Medications   Medication Sig Dispense Refill   • atorvastatin (LIPITOR) 10 mg tablet TAKE 1 TABLET BY MOUTH EVERY DAY 90 tablet 3   • B Complex Vitamins (VITAMIN B COMPLEX 100 IJ)      • Calcium-Magnesium 500-250 MG TABS Take by mouth Take one tablet by mouth daily  • cholecalciferol (VITAMIN D3) 1,000 units tablet Take 1,000 Units by mouth daily     • Omega-3 Fatty Acids (OMEGA 3 PO) Take by mouth Patient takes 2 tablets daily     • pantoprazole (PROTONIX) 40 mg tablet Take by mouth daily       • Probiotic Product (PROBIOTIC PO) Take by mouth Take one tablet by mouth daily  • acetaminophen (TYLENOL) 325 mg tablet Take 2 tablets (650 mg total) by mouth every 6 (six) hours as needed for mild pain, headaches or fever (Patient not taking: Reported on 11/14/2022)  0   • aspirin (ECOTRIN LOW STRENGTH) 81 mg EC tablet Take 81 mg by mouth daily (Patient not taking: Reported on 1/10/2023)       No current facility-administered medications for this visit  Current Outpatient Medications on File Prior to Visit   Medication Sig   • atorvastatin (LIPITOR) 10 mg tablet TAKE 1 TABLET BY MOUTH EVERY DAY   • B Complex Vitamins (VITAMIN B COMPLEX 100 IJ)    • Calcium-Magnesium 500-250 MG TABS Take by mouth Take one tablet by mouth daily  • cholecalciferol (VITAMIN D3) 1,000 units tablet Take 1,000 Units by mouth daily   • Omega-3 Fatty Acids (OMEGA 3 PO) Take by mouth Patient takes 2 tablets daily   • pantoprazole (PROTONIX) 40 mg tablet Take by mouth daily     • Probiotic Product (PROBIOTIC PO) Take by mouth Take one tablet by mouth daily  • acetaminophen (TYLENOL) 325 mg tablet Take 2 tablets (650 mg total) by mouth every 6 (six) hours as needed for mild pain, headaches or fever (Patient not taking: Reported on 11/14/2022)   • aspirin (ECOTRIN LOW STRENGTH) 81 mg EC tablet Take 81 mg by mouth daily (Patient not taking: Reported on 1/10/2023)     No current facility-administered medications on file prior to visit  She is allergic to seasonal ic [cholestatin]       Review of Systems   Constitutional: Negative for activity change, appetite change, fatigue and unexpected weight change  Eyes: Negative for visual disturbance  Respiratory: Negative for cough, chest tightness, shortness of breath and wheezing  Cardiovascular: Negative for chest pain, palpitations and leg swelling  Breast: Patient denies tenderness, nipple discharge, masses, or erythema  Gastrointestinal: Positive for diarrhea  Negative for abdominal distention, abdominal pain, blood in stool, constipation, nausea and vomiting  Rectal urgency once a month  Endocrine: Negative for cold intolerance and heat intolerance  Genitourinary: Negative for decreased urine volume, difficulty urinating, dyspareunia, dysuria, frequency, hematuria, menstrual problem, pelvic pain, urgency, vaginal bleeding, vaginal discharge and vaginal pain  Musculoskeletal: Negative for arthralgias  Skin: Negative for rash  Neurological: Negative for weakness, light-headedness, numbness and headaches  Hematological: Does not bruise/bleed easily  Psychiatric/Behavioral: Negative for agitation, behavioral problems and sleep disturbance  The patient is not nervous/anxious  Objective:    Vitals:    01/10/23 1248   BP: 130/60   BP Location: Left arm   Patient Position: Sitting   Cuff Size: Standard   Weight: 75 3 kg (166 lb)   Height: 5' 7 5" (1 715 m)           Physical Exam  Vitals and nursing note reviewed  Exam conducted with a chaperone present  Constitutional:       General: She is not in acute distress  Appearance: She is well-developed  HENT:      Head: Normocephalic and atraumatic  Eyes:      General: No scleral icterus  Right eye: No discharge  Left eye: No discharge  Extraocular Movements: Extraocular movements intact  Conjunctiva/sclera: Conjunctivae normal    Neck:      Thyroid: No thyromegaly  Trachea: No tracheal deviation  Cardiovascular:      Rate and Rhythm: Normal rate and regular rhythm  Heart sounds: Normal heart sounds  No murmur heard  Pulmonary:      Effort: Pulmonary effort is normal  No respiratory distress  Breath sounds: Normal breath sounds  No wheezing  Chest:   Breasts:     Breasts are symmetrical       Right: No inverted nipple, mass, nipple discharge, skin change or tenderness  Left: No inverted nipple, mass, nipple discharge, skin change or tenderness  Abdominal:      General: Abdomen is flat  Bowel sounds are normal  There is no distension  Palpations: Abdomen is soft  There is no mass  Tenderness: There is no abdominal tenderness  There is no right CVA tenderness, left CVA tenderness, guarding or rebound  Hernia: No hernia is present  Genitourinary:     General: Normal vulva  Labia:         Right: No rash, tenderness or lesion  Left: No rash, tenderness or lesion  Vagina: Normal       Cervix: No cervical motion tenderness or discharge  Uterus: Not deviated, not enlarged and not tender  Adnexa:         Right: No mass, tenderness or fullness  Left: No mass, tenderness or fullness  Rectum: No external hemorrhoid  Comments: Urethral meatus within normal limits  Perineum within normal limits  Bladder well supported  Physiologic vaginal atrophy  Nulliparous cervix  Uterus is mobile anteverted and normal size  I cannot appreciate the fibroid  Musculoskeletal:         General: No tenderness  Normal range of motion  Cervical back: Normal range of motion and neck supple  Lymphadenopathy:      Cervical: No cervical adenopathy  Skin:     General: Skin is warm and dry  Neurological:      Mental Status: She is alert and oriented to person, place, and time  Psychiatric:         Mood and Affect: Mood normal          Behavior: Behavior normal          Thought Content:  Thought content normal          Judgment: Judgment normal

## 2023-01-10 NOTE — PROGRESS NOTES
Assessment/Plan: Thickened endometrial stripe at 16 mm with cystic change  Complex left adnexa mass    US  1  Abnormally thickened endometrial stripe with cystic change concerning for hyperplasia or neoplasm  2   Complex multilocular cyst in the left adnexa  O - RADS category: 4 -intermediate risk  Referral for MRI study  May be managed by gynecologist with gynecologic oncologist consultation  3   Calcified myoma  Recommend  and endometrial biopsy which will help guide future care  Discussed MRI of the pelvis, GYN oncology referral, and possible ultrasound in 3 months if the  level and endometrial biopsy were normal   If either of these studies are abnormal then additional imaging is not necessary and she will be referred to GYN oncology  She asked if a hysterectomy at this point would be indicated  The GYN oncology referral could be made today or after the studies return  They would at least want to know the results of the endometrial sampling because that would effect the planned surgery  If adenocarcinoma is present then lymph node sampling would be performed  If the surgery is done primarily for ovarian concerns lymph node sampling would be planned from the outset  Endometrial biopsy was fully explained including the potential risks which include infection, uterine perforation, syncope, and insufficient sampling due to cervical stenosis, pain, and/or inadequate tissue  Diagnoses and all orders for this visit:    Abnormal ultrasound of uterus    Increased endometrial stripe thickness  -     Ambulatory Referral to Gynecology    Neoplasm of uncertain behavior of ovary, left  -     ; Future              Subjective:        Patient ID: Vignesh Judge is a 78 y o  female  Mrs Juan Antonio Lewis is seen in consultation for an abnormal CAT scan and ultrasound performed while hospitalized in November     In November she developed painful bowel movements that were associated with heavy rectal bleeding  She was admitted and placed on IV antibiotics after diagnosis of colitis was made  She felt much better after 24 hours of antibiotics  She remained in the hospital for total 4 days  A CAT scan performed 11/5 revealed colitis of the transverse and descending colon  There was also a suggestion of endometrial thickening and a nonurgent outpatient ultrasound was recommended  This was performed on December 2  It revealed a small uterus with a 3 6 cm calcified left fundal intramural fibroid  The endometrial stripe was 16 mm  There was a Complex multilocular cyst present in the left adnexa measuring 2 4 x 1 8 x 1 4 cm  It contained multiple thin internal septations with slightly irregular borders  Currently she is complaining of a "pinching" pain in the left lower lateral abdomen that occurs intermittently  It does not require analgesia  It is not associated with activity or eating  On January 4 she had a colonoscopy which revealed a single polyp  The following portions of the patient's history were reviewed and updated as appropriate: She  has a past medical history of Arthritis, Endometriosis, and GERD (gastroesophageal reflux disease)  Patient Active Problem List    Diagnosis Date Noted   • Abnormal ultrasound of uterus 01/10/2023   • Neoplasm of uncertain behavior of ovary, 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   • Colitis 11/06/2022   • Increased endometrial stripe thickness 11/06/2022   • 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 01/31/2019   • Irritable bowel syndrome 01/07/2016   PMH:  Menarche 17  G0  Primary Infertility '82  Dx Laparoscopy - Endometriosis '83  Dysmenorrhea  Menopause 54 - no HRT, No PMB  Dyspareunia 58 - no vaginal estrogen  IBS  GERD '21  Armando's Esophagus w/o Dysplasia - done q 3 yrs  IBS  Arthritis  Hypercholesterolemia  Colitis of distal transverse and descending colon 11/22  She  has a past surgical history that includes Tonsillectomy;  Colpopexy; Blepharoptosis repair (Bilateral); and Colonoscopy (01/04/2023)  Her family history includes Atrial fibrillation in her brother and mother; Breast cancer in her paternal aunt; Dementia in her mother; Heart failure in her mother; Pancreatic cancer in her father; Sleep apnea in her brother  FH:  F - Pancreatic Ca d 79 a few months p Dx  MGF - Stomach Ca d  PGF - Ca  M -arrhythmia d 82  Bro -arrhythmia  She  reports that she has quit smoking  Her smoking use included cigarettes  She has never used smokeless tobacco  She reports current alcohol use of about 7 0 standard drinks per week  She reports that she does not use drugs  SH:   to [employer] '70  She is a retired  and   She retired at age 63  Adopted daughter '85 who is a farmer in [place]  Current Outpatient Medications   Medication Sig Dispense Refill   • atorvastatin (LIPITOR) 10 mg tablet TAKE 1 TABLET BY MOUTH EVERY DAY 90 tablet 3   • B Complex Vitamins (VITAMIN B COMPLEX 100 IJ)      • Calcium-Magnesium 500-250 MG TABS Take by mouth Take one tablet by mouth daily  • cholecalciferol (VITAMIN D3) 1,000 units tablet Take 1,000 Units by mouth daily     • Omega-3 Fatty Acids (OMEGA 3 PO) Take by mouth Patient takes 2 tablets daily     • pantoprazole (PROTONIX) 40 mg tablet Take by mouth daily       • Probiotic Product (PROBIOTIC PO) Take by mouth Take one tablet by mouth daily  • acetaminophen (TYLENOL) 325 mg tablet Take 2 tablets (650 mg total) by mouth every 6 (six) hours as needed for mild pain, headaches or fever (Patient not taking: Reported on 11/14/2022)  0   • aspirin (ECOTRIN LOW STRENGTH) 81 mg EC tablet Take 81 mg by mouth daily (Patient not taking: Reported on 1/10/2023)       No current facility-administered medications for this visit       Current Outpatient Medications on File Prior to Visit   Medication Sig   • atorvastatin (LIPITOR) 10 mg tablet TAKE 1 TABLET BY MOUTH EVERY DAY   • B Complex Vitamins (VITAMIN B COMPLEX 100 IJ)    • Calcium-Magnesium 500-250 MG TABS Take by mouth Take one tablet by mouth daily  • cholecalciferol (VITAMIN D3) 1,000 units tablet Take 1,000 Units by mouth daily   • Omega-3 Fatty Acids (OMEGA 3 PO) Take by mouth Patient takes 2 tablets daily   • pantoprazole (PROTONIX) 40 mg tablet Take by mouth daily     • Probiotic Product (PROBIOTIC PO) Take by mouth Take one tablet by mouth daily  • acetaminophen (TYLENOL) 325 mg tablet Take 2 tablets (650 mg total) by mouth every 6 (six) hours as needed for mild pain, headaches or fever (Patient not taking: Reported on 11/14/2022)   • aspirin (ECOTRIN LOW STRENGTH) 81 mg EC tablet Take 81 mg by mouth daily (Patient not taking: Reported on 1/10/2023)     No current facility-administered medications on file prior to visit  She is allergic to seasonal ic [cholestatin]       Review of Systems   Constitutional: Negative for activity change, appetite change, fatigue and unexpected weight change  Eyes: Negative for visual disturbance  Respiratory: Negative for cough, chest tightness, shortness of breath and wheezing  Cardiovascular: Negative for chest pain, palpitations and leg swelling  Breast: Patient denies tenderness, nipple discharge, masses, or erythema  Gastrointestinal: Positive for diarrhea  Negative for abdominal distention, abdominal pain, blood in stool, constipation, nausea and vomiting  Rectal urgency once a month  Endocrine: Negative for cold intolerance and heat intolerance  Genitourinary: Negative for decreased urine volume, difficulty urinating, dysuria, frequency, hematuria, menstrual problem, pelvic pain, urgency, vaginal bleeding, vaginal discharge and vaginal pain  She has not been sexually active for 10 years  Musculoskeletal: Negative for arthralgias  Skin: Negative for rash  Neurological: Negative for weakness, light-headedness, numbness and headaches  Hematological: Does not bruise/bleed easily     Psychiatric/Behavioral: Negative for agitation, behavioral problems and sleep disturbance  The patient is not nervous/anxious  Objective:    Vitals:    01/10/23 1248   BP: 130/60   BP Location: Left arm   Patient Position: Sitting   Cuff Size: Standard   Weight: 75 3 kg (166 lb)   Height: 5' 7 5" (1 715 m)            Physical Exam  Vitals and nursing note reviewed  Exam conducted with a chaperone present  Constitutional:       General: She is not in acute distress  Appearance: She is well-developed  HENT:      Head: Normocephalic and atraumatic  Eyes:      General: No scleral icterus  Right eye: No discharge  Left eye: No discharge  Extraocular Movements: Extraocular movements intact  Conjunctiva/sclera: Conjunctivae normal    Neck:      Thyroid: No thyromegaly  Trachea: No tracheal deviation  Cardiovascular:      Rate and Rhythm: Normal rate and regular rhythm  Heart sounds: Normal heart sounds  No murmur heard  Pulmonary:      Effort: Pulmonary effort is normal  No respiratory distress  Breath sounds: Normal breath sounds  No wheezing  Chest:   Breasts:     Breasts are symmetrical       Right: No inverted nipple, mass, nipple discharge, skin change or tenderness  Left: No inverted nipple, mass, nipple discharge, skin change or tenderness  Abdominal:      General: Abdomen is flat  Bowel sounds are normal  There is no distension  Palpations: Abdomen is soft  There is no mass  Tenderness: There is no abdominal tenderness  There is no right CVA tenderness, left CVA tenderness, guarding or rebound  Hernia: No hernia is present  Genitourinary:     General: Normal vulva  Labia:         Right: No rash, tenderness or lesion  Left: No rash, tenderness or lesion  Vagina: Normal       Cervix: No cervical motion tenderness or discharge  Uterus: Not deviated, not enlarged and not tender         Adnexa:         Right: No mass, tenderness or fullness  Left: No mass, tenderness or fullness  Rectum: No external hemorrhoid  Comments: Urethral meatus within normal limits  Perineum within normal limits  Bladder well supported  Physiologic vaginal atrophy  Nulliparous cervix  Uterus is mobile anteverted and normal size  I cannot appreciate the fibroid  Musculoskeletal:         General: No tenderness  Normal range of motion  Cervical back: Normal range of motion and neck supple  Lymphadenopathy:      Cervical: No cervical adenopathy  Skin:     General: Skin is warm and dry  Neurological:      Mental Status: She is alert and oriented to person, place, and time  Psychiatric:         Mood and Affect: Mood normal          Behavior: Behavior normal          Thought Content:  Thought content normal          Judgment: Judgment normal

## 2023-01-18 ENCOUNTER — TELEPHONE (OUTPATIENT)
Dept: OBGYN CLINIC | Facility: CLINIC | Age: 78
End: 2023-01-18

## 2023-01-18 NOTE — TELEPHONE ENCOUNTER
Pt would like to talk about any different options instead of having an EMB done on 2/2/23, please call to advise   Thank you

## 2023-01-18 NOTE — TELEPHONE ENCOUNTER
Pt has never had an EMB but feels it will not be conclusive - so she prefers a d & c instead  Her Ca 125 is normal  She knows you are away till next week   Thank you

## 2023-01-24 NOTE — TELEPHONE ENCOUNTER
P  AM LM     14-helpful and reassuring  Understand your concern regarding the possible inadequacy of a office endometrial biopsy  It is worth attempting-would only take 10 minutes  D&C in the hospital is a 4 hour process and involves general anesthesia or conscious sedation  Please reach out to me via MyChart or call back again  Thank you

## 2023-01-26 ENCOUNTER — TELEPHONE (OUTPATIENT)
Dept: OBGYN CLINIC | Facility: CLINIC | Age: 78
End: 2023-01-26

## 2023-01-26 NOTE — TELEPHONE ENCOUNTER
Pt decided she would like D&C  Pt asking if Dr Manda Peng would still like to see pt on 2/2  Please advise

## 2023-01-26 NOTE — TELEPHONE ENCOUNTER
Pt has an appt for an emb on 2/2  Rachid Cabrera has decided she wants a D&C  Shall  Use that spot for pre op   ? Thanks

## 2023-02-01 NOTE — PROGRESS NOTES
Presents assessment/Plan: Thickened endometrium of 16 mm with multiple cystic areas and vascularity  Complicated multilocular left ovarian cyst 2 4 cm with multiple thin internal septations  Normal   No adenopathy or ascites on CAT scan 11/5/22  EUA, Hysteroscopy with fractional D&C and Diagnostic Laparoscopy with pelvic washings and BSO plus any other procedures      Hysteroscopy, D&C with and without diagnostic laparoscopy, pelvic washings and BSO was fully explained  If laparoscopy was declined then re-evaluation of the ovary would be performed by ultrasound in 2-3 months    Surgery requires general anesthesia  Risks include anesthesia, infection, uterine perforation, surgery to evaluate for uterine perforation, DVTs, paresthesias from stirrups, allergic reaction to medications, thermal injury and technical difficulties  In addition Diagnostic laparoscopy risks include infection, damage to surrounding structures including the bowel, bladder, ureters, nerves and blood vessels, surgery to evaluate surgical complications not apparent at the initial operation, need for transfusion and laparotomy  She desires both procedures to be performed  If ovarian cancer was discovered it would need to be addressed through a more involved procedure at a later date which  would include hysterectomy and staging         Diagnoses and all orders for this visit:    Abnormal ultrasound of pelvis    Increased endometrial stripe thickness    Neoplasm of uncertain behavior of ovary, left              Subjective:        Patient ID: Chito Baker is a 78 y o  female  Mrs Bernardino Lopez presents today to discuss treatment options after she declined to have an endometrial sampling in the office for an abnormal endometrial stripe of 16 mm found on ultrasound  She felt it would not be as thorough and has heard horror stories from friends about the discomfort  She also had a left ovarian cyst with septations    It was 2 4 cm in diameter  A CA -125 level was 14  These were incidentally discovered upon CAT scan performed when she had colitis in November  She denied any vaginal bleeding and had mild left lower quadrant discomfort felt to be related to the colitis  Options most recently discussed included office sampling with repeat ultrasound of the ovary in 3 months if the  level was normal and referral to GYN oncology  If either of these tests were abnormal she would be referred promptly  She asked if a hysterectomy could be performed as the initial route of therapy  If endometrial cancer was present she would then need a separate procedure to complete staging by evaluating the lymph nodes  Her health is not changed at all since the admission  She does have some intermittent diarrhea and occasional discomfort in the left lower quadrant that is intermittent, mild and not requiring any analgesics  She has no family history of any gynecologic malignancies  She did have endometriosis 40 years ago diagnosed by laparoscopy  Since she is choosing inpatient surgical evaluation of the uterus consideration will be given for removal of the abnormal ovary  Removal of both ovaries as well as fallopian tubes will be made  The following portions of the patient's history were reviewed and updated as appropriate: She  has a past medical history of Arthritis, Endometriosis, and GERD (gastroesophageal reflux disease)    Patient Active Problem List    Diagnosis Date Noted   • Abnormal ultrasound of pelvis 02/02/2023   • Abnormal ultrasound of uterus 01/10/2023   • Neoplasm of uncertain behavior of ovary, 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   • Colitis 11/06/2022   • Increased endometrial stripe thickness 11/06/2022   • 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 01/31/2019   • Irritable bowel syndrome 01/07/2016   PMH:  Menarche 17  G0  Primary Infertility '82  Dx Laparoscopy - Endometriosis '83  Dysmenorrhea  Menopause 54 - no HRT, No PMB  Dyspareunia 58 - no vaginal estrogen  IBS  GERD '21  Armando's Esophagus w/o Dysplasia - done q 3 yrs  IBS  Arthritis  Hypercholesterolemia  Colitis of distal transverse and descending colon 11/22  She  has a past surgical history that includes Tonsillectomy; Colpopexy; Blepharoptosis repair (Bilateral); and Colonoscopy (01/04/2023)  Her family history includes Atrial fibrillation in her brother and mother; Breast cancer in her paternal aunt; Dementia in her mother; Heart failure in her mother; Pancreatic cancer in her father; Sleep apnea in her brother  FH:  F - Pancreatic Ca d 79 a few months p Dx  MGF - Stomach Ca d  PGF - Ca  M -arrhythmia d 82  Bro -arrhythmia  She  reports that she has quit smoking  Her smoking use included cigarettes  She has never used smokeless tobacco  She reports current alcohol use of about 7 0 standard drinks per week  She reports that she does not use drugs  SH:   to [place] '70  She is a retired  and   She retired at age 63  Adopted daughter '85 who is a farmer in [place]  Current Outpatient Medications   Medication Sig Dispense Refill   • atorvastatin (LIPITOR) 10 mg tablet TAKE 1 TABLET BY MOUTH EVERY DAY 90 tablet 3   • B Complex Vitamins (VITAMIN B COMPLEX 100 IJ)      • Calcium-Magnesium 500-250 MG TABS Take by mouth Take one tablet by mouth daily  • cholecalciferol (VITAMIN D3) 1,000 units tablet Take 1,000 Units by mouth daily     • Omega-3 Fatty Acids (OMEGA 3 PO) Take by mouth Patient takes 2 tablets daily     • pantoprazole (PROTONIX) 40 mg tablet Take by mouth daily       • Probiotic Product (PROBIOTIC PO) Take by mouth Take one tablet by mouth daily  No current facility-administered medications for this visit       Current Outpatient Medications on File Prior to Visit   Medication Sig   • atorvastatin (LIPITOR) 10 mg tablet TAKE 1 TABLET BY MOUTH EVERY DAY   • B Complex Vitamins (VITAMIN B COMPLEX 100 IJ)    • Calcium-Magnesium 500-250 MG TABS Take by mouth Take one tablet by mouth daily  • cholecalciferol (VITAMIN D3) 1,000 units tablet Take 1,000 Units by mouth daily   • Omega-3 Fatty Acids (OMEGA 3 PO) Take by mouth Patient takes 2 tablets daily   • pantoprazole (PROTONIX) 40 mg tablet Take by mouth daily     • Probiotic Product (PROBIOTIC PO) Take by mouth Take one tablet by mouth daily  • [DISCONTINUED] acetaminophen (TYLENOL) 325 mg tablet Take 2 tablets (650 mg total) by mouth every 6 (six) hours as needed for mild pain, headaches or fever (Patient not taking: Reported on 11/14/2022)   • [DISCONTINUED] aspirin (ECOTRIN LOW STRENGTH) 81 mg EC tablet Take 81 mg by mouth daily (Patient not taking: Reported on 1/10/2023)     No current facility-administered medications on file prior to visit  She is allergic to seasonal ic [cholestatin]       Review of Systems   Constitutional: Negative for activity change, appetite change, fatigue and unexpected weight change  Eyes: Negative for visual disturbance  Respiratory: Negative for cough, chest tightness, shortness of breath and wheezing  Cardiovascular: Negative for chest pain, palpitations and leg swelling  Breast: Patient denies tenderness, nipple discharge, masses, or erythema  Gastrointestinal: Positive for abdominal pain and diarrhea  Negative for abdominal distention, blood in stool, constipation, nausea and vomiting  Rectal urgency once a month  Endocrine: Negative for cold intolerance and heat intolerance  Genitourinary: Negative for decreased urine volume, difficulty urinating, dysuria, frequency, hematuria, menstrual problem, pelvic pain, urgency, vaginal bleeding, vaginal discharge and vaginal pain  She has not been sexually active for 10 years  Musculoskeletal: Negative for arthralgias  Skin: Negative for rash  Neurological: Negative for weakness, light-headedness, numbness and headaches  Hematological: Does not bruise/bleed easily  Psychiatric/Behavioral: Negative for agitation, behavioral problems and sleep disturbance  The patient is not nervous/anxious  Objective:    Vitals:    02/02/23 1009   BP: 130/70   BP Location: Left arm   Patient Position: Sitting   Cuff Size: Standard   Weight: 75 4 kg (166 lb 3 2 oz)            Physical Exam  Vitals and nursing note reviewed  Exam conducted with a chaperone present  Constitutional:       General: She is not in acute distress  Appearance: She is well-developed  HENT:      Head: Normocephalic and atraumatic  Eyes:      General: No scleral icterus  Right eye: No discharge  Left eye: No discharge  Extraocular Movements: Extraocular movements intact  Conjunctiva/sclera: Conjunctivae normal    Neck:      Thyroid: No thyromegaly  Trachea: No tracheal deviation  Cardiovascular:      Rate and Rhythm: Normal rate and regular rhythm  Heart sounds: Normal heart sounds  No murmur heard  Pulmonary:      Effort: Pulmonary effort is normal  No respiratory distress  Breath sounds: Normal breath sounds  No wheezing  Abdominal:      General: Abdomen is flat  Bowel sounds are normal  There is no distension  Palpations: Abdomen is soft  There is no mass  Tenderness: There is no abdominal tenderness  There is no right CVA tenderness, left CVA tenderness, guarding or rebound  Hernia: No hernia is present  Genitourinary:     General: Normal vulva  Labia:         Right: No rash, tenderness or lesion  Left: No rash, tenderness or lesion  Vagina: Normal       Cervix: No cervical motion tenderness or discharge  Uterus: Not deviated, not enlarged and not tender  Adnexa:         Right: No mass, tenderness or fullness  Left: No mass, tenderness or fullness  Rectum: No external hemorrhoid  Comments: Urethral meatus within normal limits  Perineum within normal limits  Bladder well supported  Physiologic vaginal atrophy  Nulliparous cervix  Uterus is mobile anteverted and normal size  I cannot appreciate the fibroid  Musculoskeletal:         General: No tenderness  Normal range of motion  Cervical back: Normal range of motion and neck supple  Lymphadenopathy:      Cervical: No cervical adenopathy  Skin:     General: Skin is warm and dry  Neurological:      Mental Status: She is alert and oriented to person, place, and time  Psychiatric:         Mood and Affect: Mood normal          Behavior: Behavior normal          Thought Content:  Thought content normal          Judgment: Judgment normal

## 2023-02-01 NOTE — H&P (VIEW-ONLY)
Presents assessment/Plan: Thickened endometrium of 16 mm with multiple cystic areas and vascularity  Complicated multilocular left ovarian cyst 2 4 cm with multiple thin internal septations  Normal   No adenopathy or ascites on CAT scan 11/5/22  EUA, Hysteroscopy with fractional D&C and Diagnostic Laparoscopy with pelvic washings and BSO plus any other necessary procedures  Hysteroscopy, D&C with and without diagnostic laparoscopy, pelvic washings and BSO was fully explained  If laparoscopy was declined then re-evaluation of the ovary would be performed by ultrasound in 2-3 months    Surgery requires general anesthesia  Risks include anesthesia, infection, uterine perforation, surgery to evaluate for uterine perforation, DVTs, paresthesias from stirrups, allergic reaction to medications, thermal injury and technical difficulties  In addition Diagnostic laparoscopy risks include infection, damage to surrounding structures including the bowel, bladder, ureters, nerves and blood vessels, surgery to evaluate surgical complications not apparent at the initial operation, need for transfusion and laparotomy  She desires both procedures to be performed  If ovarian cancer was discovered it would need to be addressed through a more involved procedure at a later date which  would include hysterectomy and staging         Diagnoses and all orders for this visit:    Abnormal ultrasound of pelvis    Increased endometrial stripe thickness    Neoplasm of uncertain behavior of ovary, left              Subjective:        Patient ID: Kailey Calle is a 78 y o  female  Mrs Ortiz Andujar presents today to discuss treatment options after she declined to have an endometrial sampling in the office for an abnormal endometrial stripe of 16 mm found on ultrasound  She felt it would not be as thorough and has heard horror stories from friends about the discomfort  She also had a left ovarian cyst with septations    It was 2 4 cm in diameter  A CA -125 level was 14  These were incidentally discovered upon CAT scan performed when she had colitis in November  She denied any vaginal bleeding and had mild left lower quadrant discomfort felt to be related to the colitis  Options most recently discussed included office sampling with repeat ultrasound of the ovary in 3 months if the  level was normal and referral to GYN oncology  If either of these tests were abnormal she would be referred promptly  She asked if a hysterectomy could be performed as the initial route of therapy  If endometrial cancer was present she would then need a separate procedure to complete staging by evaluating the lymph nodes  Her health has not changed at all since the admission  She does have some intermittent diarrhea and occasional discomfort in the left lower quadrant that is intermittent, mild and not requiring any analgesics  She has no family history of any gynecologic malignancies  She did have endometriosis 40 years ago diagnosed by laparoscopy  Since she is choosing inpatient surgical evaluation of the uterus consideration will be given for removal of the abnormal ovary  Removal of both ovaries as well as fallopian tubes will be made  The following portions of the patient's history were reviewed and updated as appropriate: She  has a past medical history of Arthritis, Endometriosis, and GERD (gastroesophageal reflux disease)    Patient Active Problem List    Diagnosis Date Noted   • Abnormal ultrasound of pelvis 02/02/2023   • Abnormal ultrasound of uterus 01/10/2023   • Neoplasm of uncertain behavior of ovary, 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   • Colitis 11/06/2022   • Increased endometrial stripe thickness 11/06/2022   • 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 01/31/2019   • Irritable bowel syndrome 01/07/2016   PMH:  Menarche 17  G0  Primary Infertility '82  Dx Laparoscopy - Endometriosis '83  Dysmenorrhea  Menopause 54 - no HRT, No PMB  Dyspareunia 58 - no vaginal estrogen  IBS  GERD '21  Armando's Esophagus w/o Dysplasia - done q 3 yrs  IBS  Arthritis  Hypercholesterolemia  Colitis of distal transverse and descending colon 11/22  She  has a past surgical history that includes Tonsillectomy; Colpopexy; Blepharoptosis repair (Bilateral); and Colonoscopy (01/04/2023)  Her family history includes Atrial fibrillation in her brother and mother; Breast cancer in her paternal aunt; Dementia in her mother; Heart failure in her mother; Pancreatic cancer in her father; Sleep apnea in her brother  FH:  F - Pancreatic Ca d 79 a few months p Dx  MGF - Stomach Ca d  PGF - Ca  M -arrhythmia d 82  Bro -arrhythmia  She  reports that she has quit smoking  Her smoking use included cigarettes  She has never used smokeless tobacco  She reports current alcohol use of about 7 0 standard drinks per week  She reports that she does not use drugs  SH:   to Giselle Alfaro '70  She is a retired  and   She retired at age 63  Adopted daughter '85 who is a farmer in [place]  Current Outpatient Medications   Medication Sig Dispense Refill   • atorvastatin (LIPITOR) 10 mg tablet TAKE 1 TABLET BY MOUTH EVERY DAY 90 tablet 3   • B Complex Vitamins (VITAMIN B COMPLEX 100 IJ)      • Calcium-Magnesium 500-250 MG TABS Take by mouth Take one tablet by mouth daily  • cholecalciferol (VITAMIN D3) 1,000 units tablet Take 1,000 Units by mouth daily     • Omega-3 Fatty Acids (OMEGA 3 PO) Take by mouth Patient takes 2 tablets daily     • pantoprazole (PROTONIX) 40 mg tablet Take by mouth daily       • Probiotic Product (PROBIOTIC PO) Take by mouth Take one tablet by mouth daily  No current facility-administered medications for this visit       Current Outpatient Medications on File Prior to Visit   Medication Sig   • atorvastatin (LIPITOR) 10 mg tablet TAKE 1 TABLET BY MOUTH EVERY DAY   • B Complex Vitamins (VITAMIN B COMPLEX 100 IJ)    • Calcium-Magnesium 500-250 MG TABS Take by mouth Take one tablet by mouth daily  • cholecalciferol (VITAMIN D3) 1,000 units tablet Take 1,000 Units by mouth daily   • Omega-3 Fatty Acids (OMEGA 3 PO) Take by mouth Patient takes 2 tablets daily   • pantoprazole (PROTONIX) 40 mg tablet Take by mouth daily     • Probiotic Product (PROBIOTIC PO) Take by mouth Take one tablet by mouth daily  • [DISCONTINUED] acetaminophen (TYLENOL) 325 mg tablet Take 2 tablets (650 mg total) by mouth every 6 (six) hours as needed for mild pain, headaches or fever (Patient not taking: Reported on 11/14/2022)   • [DISCONTINUED] aspirin (ECOTRIN LOW STRENGTH) 81 mg EC tablet Take 81 mg by mouth daily (Patient not taking: Reported on 1/10/2023)     No current facility-administered medications on file prior to visit  She is allergic to seasonal ic [cholestatin]       Review of Systems   Constitutional: Negative for activity change, appetite change, fatigue and unexpected weight change  Eyes: Negative for visual disturbance  Respiratory: Negative for cough, chest tightness, shortness of breath and wheezing  Cardiovascular: Negative for chest pain, palpitations and leg swelling  Breast: Patient denies tenderness, nipple discharge, masses, or erythema  Gastrointestinal: Positive for abdominal pain and diarrhea  Negative for abdominal distention, blood in stool, constipation, nausea and vomiting  Rectal urgency once a month  Endocrine: Negative for cold intolerance and heat intolerance  Genitourinary: Negative for decreased urine volume, difficulty urinating, dysuria, frequency, hematuria, menstrual problem, pelvic pain, urgency, vaginal bleeding, vaginal discharge and vaginal pain  She has not been sexually active for 10 years  Musculoskeletal: Negative for arthralgias  Skin: Negative for rash  Neurological: Negative for weakness, light-headedness, numbness and headaches  Hematological: Does not bruise/bleed easily  Psychiatric/Behavioral: Negative for agitation, behavioral problems and sleep disturbance  The patient is not nervous/anxious  Objective:    Vitals:    02/02/23 1009   BP: 130/70   BP Location: Left arm   Patient Position: Sitting   Cuff Size: Standard   Weight: 75 4 kg (166 lb 3 2 oz)            Physical Exam  Vitals and nursing note reviewed  Exam conducted with a chaperone present  Constitutional:       General: She is not in acute distress  Appearance: She is well-developed  HENT:      Head: Normocephalic and atraumatic  Eyes:      General: No scleral icterus  Right eye: No discharge  Left eye: No discharge  Extraocular Movements: Extraocular movements intact  Conjunctiva/sclera: Conjunctivae normal    Neck:      Thyroid: No thyromegaly  Trachea: No tracheal deviation  Cardiovascular:      Rate and Rhythm: Normal rate and regular rhythm  Heart sounds: Normal heart sounds  No murmur heard  Pulmonary:      Effort: Pulmonary effort is normal  No respiratory distress  Breath sounds: Normal breath sounds  No wheezing  Abdominal:      General: Abdomen is flat  Bowel sounds are normal  There is no distension  Palpations: Abdomen is soft  There is no mass  Tenderness: There is no abdominal tenderness  There is no right CVA tenderness, left CVA tenderness, guarding or rebound  Hernia: No hernia is present  Genitourinary:     General: Normal vulva  Labia:         Right: No rash, tenderness or lesion  Left: No rash, tenderness or lesion  Vagina: Normal       Cervix: No cervical motion tenderness or discharge  Uterus: Not deviated, not enlarged and not tender  Adnexa:         Right: No mass, tenderness or fullness  Left: No mass, tenderness or fullness  Rectum: No external hemorrhoid  Comments: Urethral meatus within normal limits  Perineum within normal limits  Bladder well supported  Physiologic vaginal atrophy  Nulliparous cervix  Uterus is mobile anteverted and normal size  I cannot appreciate the fibroid  Musculoskeletal:         General: No tenderness  Normal range of motion  Cervical back: Normal range of motion and neck supple  Lymphadenopathy:      Cervical: No cervical adenopathy  Skin:     General: Skin is warm and dry  Neurological:      Mental Status: She is alert and oriented to person, place, and time  Psychiatric:         Mood and Affect: Mood normal          Behavior: Behavior normal          Thought Content:  Thought content normal          Judgment: Judgment normal

## 2023-02-02 ENCOUNTER — PROCEDURE VISIT (OUTPATIENT)
Dept: OBGYN CLINIC | Facility: CLINIC | Age: 78
End: 2023-02-02

## 2023-02-02 ENCOUNTER — TELEPHONE (OUTPATIENT)
Dept: OBGYN CLINIC | Facility: CLINIC | Age: 78
End: 2023-02-02

## 2023-02-02 VITALS — DIASTOLIC BLOOD PRESSURE: 70 MMHG | BODY MASS INDEX: 25.65 KG/M2 | SYSTOLIC BLOOD PRESSURE: 130 MMHG | WEIGHT: 166.2 LBS

## 2023-02-02 DIAGNOSIS — D39.12 NEOPLASM OF UNCERTAIN BEHAVIOR OF OVARY, LEFT: ICD-10-CM

## 2023-02-02 DIAGNOSIS — R93.89 ABNORMAL ULTRASOUND OF PELVIS: Primary | ICD-10-CM

## 2023-02-02 DIAGNOSIS — R93.89 INCREASED ENDOMETRIAL STRIPE THICKNESS: ICD-10-CM

## 2023-02-02 NOTE — TELEPHONE ENCOUNTER
Talked to patient she is scheduled for her surgical procedure with Dr Benji Ham on 2/17/2023 at the [facility], Patient aware lab work and EKG needed she will go for these ASAP

## 2023-02-06 ENCOUNTER — LAB (OUTPATIENT)
Dept: LAB | Age: 78
End: 2023-02-06

## 2023-02-06 ENCOUNTER — OFFICE VISIT (OUTPATIENT)
Dept: LAB | Age: 78
End: 2023-02-06

## 2023-02-06 DIAGNOSIS — Z01.818 PRE-OP TESTING: ICD-10-CM

## 2023-02-06 DIAGNOSIS — D39.12 NEOPLASM OF UNCERTAIN BEHAVIOR OF OVARY, LEFT: ICD-10-CM

## 2023-02-06 DIAGNOSIS — E78.00 PURE HYPERCHOLESTEROLEMIA: ICD-10-CM

## 2023-02-07 ENCOUNTER — APPOINTMENT (OUTPATIENT)
Dept: LAB | Age: 78
End: 2023-02-07

## 2023-02-07 ENCOUNTER — RA CDI HCC (OUTPATIENT)
Dept: OTHER | Facility: HOSPITAL | Age: 78
End: 2023-02-07

## 2023-02-07 LAB
ALBUMIN SERPL BCP-MCNC: 3.6 G/DL (ref 3.5–5)
ALP SERPL-CCNC: 49 U/L (ref 46–116)
ALT SERPL W P-5'-P-CCNC: 24 U/L (ref 12–78)
ANION GAP SERPL CALCULATED.3IONS-SCNC: 5 MMOL/L (ref 4–13)
AST SERPL W P-5'-P-CCNC: 14 U/L (ref 5–45)
BASOPHILS # BLD AUTO: 0.06 THOUSANDS/ÂΜL (ref 0–0.1)
BASOPHILS NFR BLD AUTO: 1 % (ref 0–1)
BILIRUB SERPL-MCNC: 0.45 MG/DL (ref 0.2–1)
BUN SERPL-MCNC: 11 MG/DL (ref 5–25)
CALCIUM SERPL-MCNC: 9.3 MG/DL (ref 8.3–10.1)
CANCER AG125 SERPL-ACNC: 10 U/ML (ref 0–30)
CHLORIDE SERPL-SCNC: 109 MMOL/L (ref 96–108)
CHOLEST SERPL-MCNC: 164 MG/DL
CO2 SERPL-SCNC: 28 MMOL/L (ref 21–32)
CREAT SERPL-MCNC: 0.73 MG/DL (ref 0.6–1.3)
EOSINOPHIL # BLD AUTO: 0.36 THOUSAND/ÂΜL (ref 0–0.61)
EOSINOPHIL NFR BLD AUTO: 5 % (ref 0–6)
ERYTHROCYTE [DISTWIDTH] IN BLOOD BY AUTOMATED COUNT: 12.6 % (ref 11.6–15.1)
GFR SERPL CREATININE-BSD FRML MDRD: 80 ML/MIN/1.73SQ M
GLUCOSE P FAST SERPL-MCNC: 97 MG/DL (ref 65–99)
HCT VFR BLD AUTO: 44.1 % (ref 34.8–46.1)
HDLC SERPL-MCNC: 65 MG/DL
HGB BLD-MCNC: 14 G/DL (ref 11.5–15.4)
IMM GRANULOCYTES # BLD AUTO: 0.02 THOUSAND/UL (ref 0–0.2)
IMM GRANULOCYTES NFR BLD AUTO: 0 % (ref 0–2)
LDLC SERPL CALC-MCNC: 79 MG/DL (ref 0–100)
LYMPHOCYTES # BLD AUTO: 2.59 THOUSANDS/ÂΜL (ref 0.6–4.47)
LYMPHOCYTES NFR BLD AUTO: 37 % (ref 14–44)
MCH RBC QN AUTO: 32.2 PG (ref 26.8–34.3)
MCHC RBC AUTO-ENTMCNC: 31.7 G/DL (ref 31.4–37.4)
MCV RBC AUTO: 101 FL (ref 82–98)
MONOCYTES # BLD AUTO: 0.62 THOUSAND/ÂΜL (ref 0.17–1.22)
MONOCYTES NFR BLD AUTO: 9 % (ref 4–12)
NEUTROPHILS # BLD AUTO: 3.39 THOUSANDS/ÂΜL (ref 1.85–7.62)
NEUTS SEG NFR BLD AUTO: 48 % (ref 43–75)
NRBC BLD AUTO-RTO: 0 /100 WBCS
PLATELET # BLD AUTO: 335 THOUSANDS/UL (ref 149–390)
PMV BLD AUTO: 9.8 FL (ref 8.9–12.7)
POTASSIUM SERPL-SCNC: 4.1 MMOL/L (ref 3.5–5.3)
PROT SERPL-MCNC: 6.7 G/DL (ref 6.4–8.4)
RBC # BLD AUTO: 4.35 MILLION/UL (ref 3.81–5.12)
SODIUM SERPL-SCNC: 142 MMOL/L (ref 135–147)
TRIGL SERPL-MCNC: 100 MG/DL
WBC # BLD AUTO: 7.04 THOUSAND/UL (ref 4.31–10.16)

## 2023-02-07 NOTE — PROGRESS NOTES
[facility]  coding opportunities       Chart reviewed, no opportunity found:   [address]        Patients Insurance     Medicare Insurance: [employer]

## 2023-02-08 NOTE — TELEPHONE ENCOUNTER
Pt said there are only drinks in the bag she was given at her last visit but was told that there would also be something to rub on her stomach before her surgery  Please call pt to discuss

## 2023-02-10 ENCOUNTER — TELEPHONE (OUTPATIENT)
Dept: OBGYN CLINIC | Facility: CLINIC | Age: 78
End: 2023-02-10

## 2023-02-10 ENCOUNTER — TELEPHONE (OUTPATIENT)
Dept: OTHER | Facility: OTHER | Age: 78
End: 2023-02-10

## 2023-02-10 LAB
ATRIAL RATE: 81 BPM
P AXIS: 72 DEGREES
PR INTERVAL: 194 MS
QRS AXIS: 149 DEGREES
QRSD INTERVAL: 84 MS
QT INTERVAL: 384 MS
QTC INTERVAL: 446 MS
T WAVE AXIS: 53 DEGREES
VENTRICULAR RATE: 81 BPM

## 2023-02-10 NOTE — PRE-PROCEDURE INSTRUCTIONS
Pre-Surgery Instructions:   Medication Instructions   • atorvastatin (LIPITOR) 10 mg tablet Take day of surgery  • B Complex Vitamins (VITAMIN B COMPLEX 100 IJ) Stop taking 7 days prior to surgery  • Calcium-Magnesium 500-250 MG TABS Stop taking 7 days prior to surgery  • cholecalciferol (VITAMIN D3) 1,000 units tablet Stop taking 7 days prior to surgery  • Omega-3 Fatty Acids (OMEGA 3 PO) Stop taking 7 days prior to surgery  • pantoprazole (PROTONIX) 40 mg tablet Take day of surgery  • Probiotic Product (PROBIOTIC PO) Stop taking 7 days prior to surgery  NPO after midnight, meds in am with sips of water  Understands when to expect preop phone call  Remove all jewelry  Advised of visitation policy  Before your operation, you play an important role in decreasing your risk for infection by washing with special antiseptic soap  This is an effective way to reduce bacteria on the skin which may help to prevent infections at the surgical site  Please read the following directions in advance  1  In the week before your operation purchase a 4 ounce bottle of antiseptic soap containing chlorhexidine gluconate 4%  Some brand names include: Aplicare, Endure, and Hibiclens  The cost is usually less than $5 00  · For your convenience, the [address] [place] [address] carries the soap  · It may also be available at your doctor's office or pre-admission testing center, and at most retail pharmacies  · If you are allergic or sensitive to soaps containing chlorhexidine gluconate (CHG), please let your doctor know so another antiseptic soap can be suggested  · CHG antiseptic soap is for external use only  2      The day before your operation follow these directions carefully to get ready  · Place clean lines (sheets) on your bed; you should sleep on clean sheets after your evening shower    · Get clean towels and washcloths ready - you need enough for 2 showers  · Set aside clean underwear, pajamas, and clothing to wear after the shower  Reminders:  · DO NOT use any other soap or body rinse on your skin during or after the antiseptic showers  · DO NOT use lotion , powder, deodorant, or perfume/aftershave of any kind on your skin after your antiseptic shower  · DO NOT shave any body parts in the 24 hours/the day before your operation  · DO NOT get the antiseptic soap in your eyes, ears, nose, mouth, or vaginal area  3      You will need to shower the night before AND the morning of your Surgery  Shower 1:  · The evening before your operation, take the fist shower  · First, shampoo your hair with regular shampoo and rinse it completely before you use the anitseptic soap  After washing and rinsing your hair, rinse your body  · Next, use a clean wash cloth to apply the antiseptic soap and wash your body from the neck down to your toes using 1/2 bottle of the antiseptic soap  You will use the other 1/2 bottle for the second shower  · Clean the area where your incision will be; later this area well for about 2 minutes  · If you ar having head or neck surgery, wash areas with the antiseptic soap  · Rinse yourself completely with running water  · Use a clean towel to dry off  · Wear clean underwear and clothing/pajamas  Shower 2:  · The Morning of your operation, take the second shower following the same steps as Shower 1 using the second 1/2 of the bottle of antiseptic soap  · Use clean cloths and towels to was and dry yourself off  · Wear clean underwear and clothing

## 2023-02-10 NOTE — RESULT ENCOUNTER NOTE
Mild irregularity in EKG  Please have her ask Dr Idalia Boswell if this needs to be evaluated before surgery  Her surgery is next Friday  Have her call today please

## 2023-02-10 NOTE — TELEPHONE ENCOUNTER
Reviewed EKG- no acute abnormality   I am seeing her on 2/13/2023- will discuss that with her as well

## 2023-02-10 NOTE — TELEPHONE ENCOUNTER
----- Message from Vy Carrington MD sent at 2/10/2023  9:39 AM EST -----  Mild irregularity in EKG  Please have her ask Dr Shelia Cuevas if this needs to be evaluated before surgery  Her surgery is next Friday  Have her call today please

## 2023-02-10 NOTE — TELEPHONE ENCOUNTER
Per comm consent lm to pt with results and recommendations of EKG from Dr Tennie Charleston   Call Dr Cuate Kemp [facility]

## 2023-02-10 NOTE — TELEPHONE ENCOUNTER
Patient called stating she has surgery with OBGYN for  D&C and removal of ovaries  She states it is 2/17/23 and they performed an EKG and are asking for Dr Walter Riddle to review as there was an abnormality  Please review and call patient to advise

## 2023-02-13 ENCOUNTER — OFFICE VISIT (OUTPATIENT)
Dept: FAMILY MEDICINE CLINIC | Facility: CLINIC | Age: 78
End: 2023-02-13

## 2023-02-13 VITALS
SYSTOLIC BLOOD PRESSURE: 120 MMHG | TEMPERATURE: 97.6 F | HEART RATE: 102 BPM | BODY MASS INDEX: 24.83 KG/M2 | DIASTOLIC BLOOD PRESSURE: 74 MMHG | WEIGHT: 163.8 LBS | OXYGEN SATURATION: 95 % | HEIGHT: 68 IN | RESPIRATION RATE: 16 BRPM

## 2023-02-13 DIAGNOSIS — R94.31 ABNORMAL EKG: ICD-10-CM

## 2023-02-13 DIAGNOSIS — Z13.820 OSTEOPOROSIS SCREENING: ICD-10-CM

## 2023-02-13 DIAGNOSIS — Z12.31 ENCOUNTER FOR SCREENING MAMMOGRAM FOR BREAST CANCER: ICD-10-CM

## 2023-02-13 DIAGNOSIS — Z00.00 MEDICARE ANNUAL WELLNESS VISIT, SUBSEQUENT: Primary | ICD-10-CM

## 2023-02-13 DIAGNOSIS — D39.12 NEOPLASM OF UNCERTAIN BEHAVIOR OF OVARY, LEFT: ICD-10-CM

## 2023-02-13 DIAGNOSIS — R06.09 DYSPNEA ON EXERTION: ICD-10-CM

## 2023-02-13 DIAGNOSIS — R93.89 INCREASED ENDOMETRIAL STRIPE THICKNESS: ICD-10-CM

## 2023-02-13 PROBLEM — R93.5 ABNORMAL ULTRASOUND OF UTERUS: Status: RESOLVED | Noted: 2023-01-10 | Resolved: 2023-02-13

## 2023-02-13 NOTE — PROGRESS NOTES
Assessment and Plan:     Problem List Items Addressed This Visit        Endocrine    Neoplasm of uncertain behavior of ovary, left     Cleared for surgery as planned 2/17/23            Other    Increased endometrial stripe thickness     Scheduled for Hysteroscopy with fractional D&C and Diagnostic Laparoscopy with pelvic washings and BSO plus any other procedures on 2/17/2023  Cleared for surgery as planned   Reviewed labs and EKG  No additional testing needed at this time         Abnormal EKG     No symptoms currently except ? SOB on exertion   Will get echo for further evaluation as an elective  Cleared for surgery at this time          Relevant Orders    Echo stress test, exercise   Other Visit Diagnoses     Medicare annual wellness visit, subsequent    -  Primary    Relevant Orders    CBC and differential    Comprehensive metabolic panel    Hemoglobin A1C    Lipid panel    Encounter for screening mammogram for breast cancer        Relevant Orders    Mammo screening bilateral w 3d & cad    Dyspnea on exertion        Relevant Orders    Echo stress test, exercise    Osteoporosis screening        Relevant Orders    DXA bone density spine hip and pelvis           Preventive health issues were discussed with patient, and age appropriate screening tests were ordered as noted in patient's After Visit Summary  Personalized health advice and appropriate referrals for health education or preventive services given if needed, as noted in patient's After Visit Summary       History of Present Illness:     Patient presents for a Medicare Wellness Visit    HPI     Here for medicare wellness  Was found to have abnormal EKG on pre op testing   Walking daily and doesn't get shortness of breath or chest pain   Sometimes she feels like shortness of breath with some activity but not sure what it is that she fells  Denies leg swelling/shortness of breath at this time         Patient Care Team:  Tim Kevin MD as PCP - General (Family Medicine)  Masoud Jeffries MD (Family Medicine)     Review of Systems:     Review of Systems   Constitutional: Negative  HENT: Negative  Eyes: Negative  Respiratory: Negative for shortness of breath  Cardiovascular: Negative  Gastrointestinal: Negative  Endocrine: Negative  Genitourinary: Negative  Musculoskeletal: Negative  Allergic/Immunologic: Negative  Neurological: Negative  Hematological: Negative  Psychiatric/Behavioral: Negative  Problem List:     Patient Active Problem List   Diagnosis   • 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   • Colitis   • Increased endometrial stripe thickness   • Irritable bowel syndrome   • Neoplasm of uncertain behavior of ovary, left   • Abnormal EKG      Past Medical and Surgical History:     Past Medical History:   Diagnosis Date   • Arthritis    • Colitis    • Endometriosis    • GERD (gastroesophageal reflux disease)      Past Surgical History:   Procedure Laterality Date   • BELPHAROPTOSIS REPAIR Bilateral    • COLONOSCOPY  01/04/2023    polyp- 5 years   • COLPOPEXY      laparoscopic   • TONSILLECTOMY        Family History:     Family History   Problem Relation Age of Onset   • Heart failure Mother         Death at 80 years   • Dementia Mother    • Atrial fibrillation Mother    • Pancreatic cancer Father    • Sleep apnea Brother    • Atrial fibrillation Brother    • Breast cancer Paternal Aunt       Social History:     Social History     Socioeconomic History   • Marital status: /Civil Union     Spouse name: None   • Number of children: None   • Years of education: None   • Highest education level: None   Occupational History   • None   Tobacco Use   • Smoking status: Never   • Smokeless tobacco: Never   Vaping Use   • Vaping Use: Never used   Substance and Sexual Activity   • Alcohol use:  Yes     Alcohol/week: 7 0 standard drinks     Types: 7 Glasses of wine per week   • Drug use: No   • Sexual activity: Not Currently Other Topics Concern   • None   Social History Narrative   • None     Social Determinants of Health     Financial Resource Strain: Low Risk    • Difficulty of Paying Living Expenses: Not hard at all   Food Insecurity: Not on file   Transportation Needs: No Transportation Needs   • Lack of Transportation (Medical): No   • Lack of Transportation (Non-Medical): No   Physical Activity: Not on file   Stress: Not on file   Social Connections: Not on file   Intimate Partner Violence: Not on file   Housing Stability: Not on file      Medications and Allergies:     Current Outpatient Medications   Medication Sig Dispense Refill   • atorvastatin (LIPITOR) 10 mg tablet TAKE 1 TABLET BY MOUTH EVERY DAY 90 tablet 3   • B Complex Vitamins (VITAMIN B COMPLEX 100 IJ)      • Calcium-Magnesium 500-250 MG TABS Take by mouth Take one tablet by mouth daily  • cholecalciferol (VITAMIN D3) 1,000 units tablet Take 1,000 Units by mouth daily     • Omega-3 Fatty Acids (OMEGA 3 PO) Take by mouth Patient takes 2 tablets daily     • pantoprazole (PROTONIX) 40 mg tablet Take by mouth daily       • Probiotic Product (PROBIOTIC PO) Take by mouth Take one tablet by mouth daily  No current facility-administered medications for this visit       No Known Allergies   Immunizations:     Immunization History   Administered Date(s) Administered   • COVID-19 PFIZER VACCINE 0 3 ML IM 03/02/2021, 03/22/2021, 12/17/2021   • INFLUENZA 10/18/2018, 12/15/2018   • Influenza, high dose seasonal 0 7 mL 02/09/2022, 11/14/2022   • Pneumococcal Conjugate 13-Valent 02/05/2020   • Pneumococcal Conjugate PCV 7 01/24/2015   • Pneumococcal Polysaccharide PPV23 01/31/2019   • Tdap 01/24/2014   • Zoster 05/14/2014, 01/24/2015      Health Maintenance:         Topic Date Due   • Breast Cancer Screening: Mammogram  Never done   • Colorectal Cancer Screening  03/29/2028   • Hepatitis C Screening  Completed         Topic Date Due   • COVID-19 Vaccine (4 - Booster for Pfizer series) 02/11/2022      Medicare Screening Tests and Risk Assessments:     Madan Nevarez is here for her Subsequent Wellness visit  Last Medicare Wellness visit information reviewed, patient interviewed and updates made to the record today  Health Risk Assessment:   Patient rates overall health as very good  Patient feels that their physical health rating is same  Patient is very satisfied with their life  Eyesight was rated as same  Hearing was rated as same  Patient feels that their emotional and mental health rating is same  Patients states they are never, rarely angry  Patient states they are never, rarely unusually tired/fatigued  Pain experienced in the last 7 days has been none  Patient states that she has experienced no weight loss or gain in last 6 months  Depression Screening:   PHQ-2 Score: 0      Fall Risk Screening: In the past year, patient has experienced: no history of falling in past year      Urinary Incontinence Screening:   Patient has not leaked urine accidently in the last six months  Home Safety:  Patient does not have trouble with stairs inside or outside of their home  Patient has working smoke alarms and has working carbon monoxide detector  Home safety hazards include: none  Nutrition:   Current diet is Regular  Medications:   Patient is currently taking over-the-counter supplements  OTC medications include: omega 3 fish oil, calcium and magnesium, vitamin D3, B complex, probiotic  Patient is able to manage medications  Activities of Daily Living (ADLs)/Instrumental Activities of Daily Living (IADLs):   Walk and transfer into and out of bed and chair?: Yes  Dress and groom yourself?: Yes    Bathe or shower yourself?: Yes    Feed yourself?  Yes  Do your laundry/housekeeping?: Yes  Manage your money, pay your bills and track your expenses?: Yes  Make your own meals?: Yes    Do your own shopping?: Yes    Previous Hospitalizations:   Any hospitalizations or ED visits within the last 12 months?: Yes    How many hospitalizations have you had in the last year?: 1-2    Advance Care Planning:   Living will: No    Durable POA for healthcare: No    Advanced directive: No    Advanced directive counseling given: Yes      PREVENTIVE SCREENINGS      Cardiovascular Screening:    General: Screening Not Indicated and History Lipid Disorder      Diabetes Screening:     General: Screening Current      Colorectal Cancer Screening:     General: Screening Current      Cervical Cancer Screening:    General: Screening Not Indicated      Lung Cancer Screening:     General: Screening Not Indicated      Hepatitis C Screening:    General: Screening Current    Screening, Brief Intervention, and Referral to Treatment (SBIRT)    Screening  Typical number of drinks in a day: 3  Typical number of drinks in a week: 3  Interpretation: Low risk drinking behavior  AUDIT-C Screenin) How often did you have a drink containing alcohol in the past year? 4 or more times a week  2) How many drinks did you have on a typical day when you were drinking in the past year? 1 to 2  3) How often did you have 6 or more drinks on one occasion in the past year? never    AUDIT-C Score: 4  Interpretation: Score 3-12 (female): POSITIVE screen for alcohol misuse    AUDIT Screenin) How often during the last year have you found that you were not able to stop drinking once you had started? 0 - never  5) How often during the last year have you failed to do what was normally expected from you because of drinking? 0 - never  6) How often during the last year have you needed a first drink in the morning to get yourself going after a heavy drinking session?  0 - never  7) How often during the last year have you had a feeling of guilt or remorse after drinking? 0 - never  8) How often during the last year have you been unable to remember what happened the night before because you had been drinking? 0 - never  9) Have you or someone else been injured as a result of your drinking? 0 - no  10) Has a relative or friend or a doctor or another health worker been concerned about your drinking or suggested you cut down? 0 - no    AUDIT Score: 4  Interpretation: Low risk alcohol consumption    Single Item Drug Screening:  How often have you used an illegal drug (including marijuana) or a prescription medication for non-medical reasons in the past year? never    Single Item Drug Screen Score: 0  Interpretation: Negative screen for possible drug use disorder    No results found  Physical Exam:     /74 (BP Location: Left arm, Patient Position: Sitting, Cuff Size: Adult)   Pulse 102   Temp 97 6 °F (36 4 °C) (Tympanic)   Resp 16   Ht 5' 8 11" (1 73 m)   Wt 74 3 kg (163 lb 12 8 oz)   SpO2 95%   BMI 24 83 kg/m²     Physical Exam  Vitals and nursing note reviewed  Constitutional:       General: She is not in acute distress  Appearance: Normal appearance  She is well-developed  HENT:      Head: Normocephalic and atraumatic  Right Ear: Tympanic membrane normal       Left Ear: Tympanic membrane normal       Nose: Nose normal       Mouth/Throat:      Mouth: Mucous membranes are moist    Eyes:      Conjunctiva/sclera: Conjunctivae normal    Cardiovascular:      Rate and Rhythm: Normal rate and regular rhythm  Pulses: Normal pulses  Heart sounds: Normal heart sounds  No murmur heard  Pulmonary:      Effort: Pulmonary effort is normal  No respiratory distress  Breath sounds: Normal breath sounds  Abdominal:      Palpations: Abdomen is soft  Tenderness: There is no abdominal tenderness  Musculoskeletal:         General: No swelling  Cervical back: Normal range of motion and neck supple  Skin:     General: Skin is warm and dry  Capillary Refill: Capillary refill takes less than 2 seconds  Neurological:      General: No focal deficit present        Mental Status: She is alert and oriented to person, place, and time     Psychiatric:         Mood and Affect: Mood normal           Alexei Julien MD

## 2023-02-13 NOTE — ASSESSMENT & PLAN NOTE
No symptoms currently except ?  SOB on exertion   Will get echo for further evaluation as an elective  Cleared for surgery at this time

## 2023-02-13 NOTE — PATIENT INSTRUCTIONS
Medicare Preventive Visit Patient Instructions  Thank you for completing your Welcome to Medicare Visit or Medicare Annual Wellness Visit today  Your next wellness visit will be due in one year (2/14/2024)  The screening/preventive services that you may require over the next 5-10 years are detailed below  Some tests may not apply to you based off risk factors and/or age  Screening tests ordered at today's visit but not completed yet may show as past due  Also, please note that scanned in results may not display below  Preventive Screenings:  Service Recommendations Previous Testing/Comments   Colorectal Cancer Screening  * Colonoscopy    * Fecal Occult Blood Test (FOBT)/Fecal Immunochemical Test (FIT)  * Fecal DNA/Cologuard Test  * Flexible Sigmoidoscopy Age: 39-70 years old   Colonoscopy: every 10 years (may be performed more frequently if at higher risk)  OR  FOBT/FIT: every 1 year  OR  Cologuard: every 3 years  OR  Sigmoidoscopy: every 5 years  Screening may be recommended earlier than age 39 if at higher risk for colorectal cancer  Also, an individualized decision between you and your healthcare provider will decide whether screening between the ages of 74-80 would be appropriate  Colonoscopy: 03/29/2018  FOBT/FIT: Not on file  Cologuard: Not on file  Sigmoidoscopy: Not on file    Screening Current     Breast Cancer Screening Age: 36 years old  Frequency: every 1-2 years  Not required if history of left and right mastectomy Mammogram: Not on file        Cervical Cancer Screening Between the ages of 21-29, pap smear recommended once every 3 years  Between the ages of 33-67, can perform pap smear with HPV co-testing every 5 years     Recommendations may differ for women with a history of total hysterectomy, cervical cancer, or abnormal pap smears in past  Pap Smear: Not on file    Screening Not Indicated   Hepatitis C Screening Once for adults born between 1945 and 1965  More frequently in patients at high risk for Hepatitis C Hep C Antibody: 02/19/2019    Screening Current   Diabetes Screening 1-2 times per year if you're at risk for diabetes or have pre-diabetes Fasting glucose: 97 mg/dL (2/7/2023)  A1C: No results in last 5 years (No results in last 5 years)  Screening Current   Cholesterol Screening Once every 5 years if you don't have a lipid disorder  May order more often based on risk factors  Lipid panel: 02/07/2023    Screening Not Indicated  History Lipid Disorder     Other Preventive Screenings Covered by Medicare:  1  Abdominal Aortic Aneurysm (AAA) Screening: covered once if your at risk  You're considered to be at risk if you have a family history of AAA  2  Lung Cancer Screening: covers low dose CT scan once per year if you meet all of the following conditions: (1) Age 50-69; (2) No signs or symptoms of lung cancer; (3) Current smoker or have quit smoking within the last 15 years; (4) You have a tobacco smoking history of at least 20 pack years (packs per day multiplied by number of years you smoked); (5) You get a written order from a healthcare provider  3  Glaucoma Screening: covered annually if you're considered high risk: (1) You have diabetes OR (2) Family history of glaucoma OR (3)  aged 48 and older OR (3)  American aged 72 and older  3  Osteoporosis Screening: covered every 2 years if you meet one of the following conditions: (1) You're estrogen deficient and at risk for osteoporosis based off medical history and other findings; (2) Have a vertebral abnormality; (3) On glucocorticoid therapy for more than 3 months; (4) Have primary hyperparathyroidism; (5) On osteoporosis medications and need to assess response to drug therapy  · Last bone density test (DXA Scan): Not on file  5  HIV Screening: covered annually if you're between the age of 12-76  Also covered annually if you are younger than 13 and older than 72 with risk factors for HIV infection   For pregnant patients, it is covered up to 3 times per pregnancy  Immunizations:  Immunization Recommendations   Influenza Vaccine Annual influenza vaccination during flu season is recommended for all persons aged >= 6 months who do not have contraindications   Pneumococcal Vaccine   * Pneumococcal conjugate vaccine = PCV13 (Prevnar 13), PCV15 (Vaxneuvance), PCV20 (Prevnar 20)  * Pneumococcal polysaccharide vaccine = PPSV23 (Pneumovax) Adults 25-60 years old: 1-3 doses may be recommended based on certain risk factors  Adults 72 years old: 1-2 doses may be recommended based off what pneumonia vaccine you previously received   Hepatitis B Vaccine 3 dose series if at intermediate or high risk (ex: diabetes, end stage renal disease, liver disease)   Tetanus (Td) Vaccine - COST NOT COVERED BY MEDICARE PART B Following completion of primary series, a booster dose should be given every 10 years to maintain immunity against tetanus  Td may also be given as tetanus wound prophylaxis  Tdap Vaccine - COST NOT COVERED BY MEDICARE PART B Recommended at least once for all adults  For pregnant patients, recommended with each pregnancy  Shingles Vaccine (Shingrix) - COST NOT COVERED BY MEDICARE PART B  2 shot series recommended in those aged 48 and above     Health Maintenance Due:      Topic Date Due   • Breast Cancer Screening: Mammogram  Never done   • Colorectal Cancer Screening  03/29/2028   • Hepatitis C Screening  Completed     Immunizations Due:      Topic Date Due   • COVID-19 Vaccine (4 - Booster for Live Peter series) 02/11/2022     Advance Directives   What are advance directives? Advance directives are legal documents that state your wishes and plans for medical care  These plans are made ahead of time in case you lose your ability to make decisions for yourself  Advance directives can apply to any medical decision, such as the treatments you want, and if you want to donate organs  What are the types of advance directives? There are many types of advance directives, and each state has rules about how to use them  You may choose a combination of any of the following:  · Living will: This is a written record of the treatment you want  You can also choose which treatments you do not want, which to limit, and which to stop at a certain time  This includes surgery, medicine, IV fluid, and tube feedings  · Durable power of  for healthcare [place] [facility]): This is a written record that states who you want to make healthcare choices for you when you are unable to make them for yourself  This person, called a proxy, is usually a family member or a friend  You may choose more than 1 proxy  · Do not resuscitate (DNR) order:  A DNR order is used in case your heart stops beating or you stop breathing  It is a request not to have certain forms of treatment, such as CPR  A DNR order may be included in other types of advance directives  · Medical directive: This covers the care that you want if you are in a coma, near death, or unable to make decisions for yourself  You can list the treatments you want for each condition  Treatment may include pain medicine, surgery, blood transfusions, dialysis, IV or tube feedings, and a ventilator (breathing machine)  · Values history: This document has questions about your views, beliefs, and how you feel and think about life  This information can help others choose the care that you would choose  Why are advance directives important? An advance directive helps you control your care  Although spoken wishes may be used, it is better to have your wishes written down  Spoken wishes can be misunderstood, or not followed  Treatments may be given even if you do not want them  An advance directive may make it easier for your family to make difficult choices about your care  Alcohol Use and Your Health    Drinking too much can harm your health    Excessive alcohol use leads to about 88,000 death in the [facility] each year, and shortens the life of those who diet by almost 30 years  Further, excessive drinking cost the economy $249 billion in 2010  Most excessive drinkers are not alcohol dependent  Excessive alcohol use has immediate effects that increase the risk of many harmful health conditions  These are most often the result of binge drinking  Over time, excessive alcohol use can lead to the development of chronic diseases and other series health problems  What is considered a "drink"? Excessive alcohol use includes:  · Binge Drinking: For women, 4 or more drinks consumed on one occasion  For men, 5 or more drinks consumed on one occasion  · Heavy Drinking: For women, 8 or more drinks per week  For men, 15 or more drinks per week  · Any alcohol used by pregnant women  · Any alcohol used by those under the age of 21 years    If you choose to drink, do so in moderation:  · Do not drink at all if you are under the age of 24, or if you are or may be pregnant, or have health problems that could be made worse by drinking    · For women, up to 1 drink per day  · For men, up to 2 drinks a day    No one should begin drinking or drink more frequently based on potential health benefits    Short-Term Health Risks:  · Injuries: motor vehicle crashes, falls, drownings, burns  · Violence: homicide, suicide, sexual assault, intimate partner violence  · Alcohol poisoning  · Reproductive health: risky sexual behaviors, unintended prengnacy, sexually transmitted diseases, miscarriage, stillbirth, fetal alcohol syndrome    Long-Term Health Risks:  · Chronic diseases: high blood pressure, heart disease, stroke, liver disease, digestive problems  · Cancers: breast, mouth and throat, liver, colon  · Learning and memory problems: dementia, poor school performance  · Mental health: depression, anxiety, insomnia  · Social problems: lost productivity, family problems, unemployment  · Alcohol dependence    For support and more information:  · Substance Abuse and [address] , [address] [place]  Web Address: https://[employer]/    · Alcoholics Anonymous        Web Address: http://www hills info/    https://www cdc gov/alcohol/fact-sheets/alcohol-use htm     © [address] 2018 Information is for End User's use only and may not be sold, redistributed or otherwise used for commercial purposes   All illustrations and images included in CareNotes® are the copyrighted property of A D A M , Inc  or [address] [place] [address]

## 2023-02-13 NOTE — ASSESSMENT & PLAN NOTE
Scheduled for Hysteroscopy with fractional D&C and Diagnostic Laparoscopy with pelvic washings and BSO plus any other procedures on 2/17/2023  Cleared for surgery as planned   Reviewed labs and EKG  No additional testing needed at this time

## 2023-02-16 ENCOUNTER — ANESTHESIA EVENT (OUTPATIENT)
Dept: PERIOP | Facility: HOSPITAL | Age: 78
End: 2023-02-16

## 2023-02-17 ENCOUNTER — HOSPITAL ENCOUNTER (OUTPATIENT)
Facility: HOSPITAL | Age: 78
Setting detail: OUTPATIENT SURGERY
Discharge: HOME | End: 2023-02-17
Attending: OBSTETRICS & GYNECOLOGY | Admitting: OBSTETRICS & GYNECOLOGY

## 2023-02-17 ENCOUNTER — ANESTHESIA (OUTPATIENT)
Dept: PERIOP | Facility: HOSPITAL | Age: 78
End: 2023-02-17

## 2023-02-17 VITALS
DIASTOLIC BLOOD PRESSURE: 81 MMHG | RESPIRATION RATE: 16 BRPM | SYSTOLIC BLOOD PRESSURE: 151 MMHG | HEIGHT: 68 IN | WEIGHT: 163 LBS | HEART RATE: 88 BPM | BODY MASS INDEX: 24.71 KG/M2 | OXYGEN SATURATION: 96 % | TEMPERATURE: 97 F

## 2023-02-17 DIAGNOSIS — N83.202 LEFT OVARIAN CYST: ICD-10-CM

## 2023-02-17 DIAGNOSIS — G89.18 POST-OP PAIN: Primary | ICD-10-CM

## 2023-02-17 DIAGNOSIS — R93.89 THICKENED ENDOMETRIUM: ICD-10-CM

## 2023-02-17 RX ORDER — ACETAMINOPHEN 325 MG/1
975 TABLET ORAL EVERY 6 HOURS PRN
Status: CANCELLED | OUTPATIENT
Start: 2023-02-17

## 2023-02-17 RX ORDER — ONDANSETRON 2 MG/ML
INJECTION INTRAMUSCULAR; INTRAVENOUS AS NEEDED
Status: DISCONTINUED | OUTPATIENT
Start: 2023-02-17 | End: 2023-02-17

## 2023-02-17 RX ORDER — BUPIVACAINE HYDROCHLORIDE 2.5 MG/ML
INJECTION, SOLUTION EPIDURAL; INFILTRATION; INTRACAUDAL AS NEEDED
Status: DISCONTINUED | OUTPATIENT
Start: 2023-02-17 | End: 2023-02-17 | Stop reason: HOSPADM

## 2023-02-17 RX ORDER — PROPOFOL 10 MG/ML
INJECTION, EMULSION INTRAVENOUS AS NEEDED
Status: DISCONTINUED | OUTPATIENT
Start: 2023-02-17 | End: 2023-02-17

## 2023-02-17 RX ORDER — LIDOCAINE HYDROCHLORIDE 20 MG/ML
INJECTION, SOLUTION EPIDURAL; INFILTRATION; INTRACAUDAL; PERINEURAL AS NEEDED
Status: DISCONTINUED | OUTPATIENT
Start: 2023-02-17 | End: 2023-02-17

## 2023-02-17 RX ORDER — FENTANYL CITRATE/PF 50 MCG/ML
50 SYRINGE (ML) INJECTION
Status: DISCONTINUED | OUTPATIENT
Start: 2023-02-17 | End: 2023-02-17 | Stop reason: HOSPADM

## 2023-02-17 RX ORDER — DEXAMETHASONE SODIUM PHOSPHATE 10 MG/ML
INJECTION, SOLUTION INTRAMUSCULAR; INTRAVENOUS AS NEEDED
Status: DISCONTINUED | OUTPATIENT
Start: 2023-02-17 | End: 2023-02-17

## 2023-02-17 RX ORDER — ONDANSETRON 2 MG/ML
4 INJECTION INTRAMUSCULAR; INTRAVENOUS ONCE AS NEEDED
Status: DISCONTINUED | OUTPATIENT
Start: 2023-02-17 | End: 2023-02-17 | Stop reason: HOSPADM

## 2023-02-17 RX ORDER — OXYCODONE HYDROCHLORIDE AND ACETAMINOPHEN 5; 325 MG/1; MG/1
1-2 TABLET ORAL EVERY 6 HOURS PRN
Qty: 8 TABLET | Refills: 0 | Status: SHIPPED | OUTPATIENT
Start: 2023-02-17 | End: 2023-02-23 | Stop reason: ALTCHOICE

## 2023-02-17 RX ORDER — OXYCODONE HYDROCHLORIDE 5 MG/1
5 TABLET ORAL EVERY 4 HOURS PRN
Status: CANCELLED | OUTPATIENT
Start: 2023-02-17

## 2023-02-17 RX ORDER — IBUPROFEN 600 MG/1
600 TABLET ORAL EVERY 6 HOURS PRN
Qty: 30 TABLET | Refills: 0 | Status: SHIPPED | OUTPATIENT
Start: 2023-02-17 | End: 2023-03-04

## 2023-02-17 RX ORDER — PROPOFOL 10 MG/ML
INJECTION, EMULSION INTRAVENOUS CONTINUOUS PRN
Status: DISCONTINUED | OUTPATIENT
Start: 2023-02-17 | End: 2023-02-17

## 2023-02-17 RX ORDER — ROCURONIUM BROMIDE 10 MG/ML
INJECTION, SOLUTION INTRAVENOUS AS NEEDED
Status: DISCONTINUED | OUTPATIENT
Start: 2023-02-17 | End: 2023-02-17

## 2023-02-17 RX ORDER — SODIUM CHLORIDE, SODIUM LACTATE, POTASSIUM CHLORIDE, CALCIUM CHLORIDE 600; 310; 30; 20 MG/100ML; MG/100ML; MG/100ML; MG/100ML
INJECTION, SOLUTION INTRAVENOUS CONTINUOUS PRN
Status: DISCONTINUED | OUTPATIENT
Start: 2023-02-17 | End: 2023-02-17

## 2023-02-17 RX ORDER — ONDANSETRON 2 MG/ML
4 INJECTION INTRAMUSCULAR; INTRAVENOUS EVERY 6 HOURS PRN
Status: DISCONTINUED | OUTPATIENT
Start: 2023-02-17 | End: 2023-02-17 | Stop reason: HOSPADM

## 2023-02-17 RX ORDER — ACETAMINOPHEN 325 MG/1
650 TABLET ORAL EVERY 6 HOURS PRN
Qty: 60 TABLET | Refills: 0 | Status: SHIPPED | OUTPATIENT
Start: 2023-02-17 | End: 2023-02-23 | Stop reason: ALTCHOICE

## 2023-02-17 RX ORDER — MIDAZOLAM HYDROCHLORIDE 2 MG/2ML
INJECTION, SOLUTION INTRAMUSCULAR; INTRAVENOUS AS NEEDED
Status: DISCONTINUED | OUTPATIENT
Start: 2023-02-17 | End: 2023-02-17

## 2023-02-17 RX ORDER — IBUPROFEN 600 MG/1
600 TABLET ORAL EVERY 6 HOURS PRN
Status: CANCELLED | OUTPATIENT
Start: 2023-02-17

## 2023-02-17 RX ORDER — FENTANYL CITRATE 50 UG/ML
INJECTION, SOLUTION INTRAMUSCULAR; INTRAVENOUS AS NEEDED
Status: DISCONTINUED | OUTPATIENT
Start: 2023-02-17 | End: 2023-02-17

## 2023-02-17 RX ORDER — MAGNESIUM HYDROXIDE 1200 MG/15ML
LIQUID ORAL AS NEEDED
Status: DISCONTINUED | OUTPATIENT
Start: 2023-02-17 | End: 2023-02-17 | Stop reason: HOSPADM

## 2023-02-17 RX ADMIN — SODIUM CHLORIDE, SODIUM LACTATE, POTASSIUM CHLORIDE, AND CALCIUM CHLORIDE: .6; .31; .03; .02 INJECTION, SOLUTION INTRAVENOUS at 13:29

## 2023-02-17 RX ADMIN — FENTANYL CITRATE 50 MCG: 50 INJECTION INTRAMUSCULAR; INTRAVENOUS at 16:35

## 2023-02-17 RX ADMIN — ROCURONIUM BROMIDE 10 MG: 10 INJECTION INTRAVENOUS at 15:46

## 2023-02-17 RX ADMIN — PROPOFOL 150 MG: 10 INJECTION, EMULSION INTRAVENOUS at 14:16

## 2023-02-17 RX ADMIN — ONDANSETRON 4 MG: 2 INJECTION INTRAMUSCULAR; INTRAVENOUS at 15:57

## 2023-02-17 RX ADMIN — PROPOFOL 30 MCG/KG/MIN: 10 INJECTION, EMULSION INTRAVENOUS at 14:21

## 2023-02-17 RX ADMIN — LIDOCAINE HYDROCHLORIDE 80 MG: 20 INJECTION, SOLUTION EPIDURAL; INFILTRATION; INTRACAUDAL; PERINEURAL at 14:16

## 2023-02-17 RX ADMIN — ROCURONIUM BROMIDE 40 MG: 10 INJECTION INTRAVENOUS at 14:16

## 2023-02-17 RX ADMIN — FENTANYL CITRATE 25 MCG: 50 INJECTION INTRAMUSCULAR; INTRAVENOUS at 14:48

## 2023-02-17 RX ADMIN — SUGAMMADEX 200 MG: 100 INJECTION, SOLUTION INTRAVENOUS at 15:57

## 2023-02-17 RX ADMIN — FENTANYL CITRATE 50 MCG: 50 INJECTION INTRAMUSCULAR; INTRAVENOUS at 14:16

## 2023-02-17 RX ADMIN — DEXAMETHASONE SODIUM PHOSPHATE 10 MG: 10 INJECTION INTRAMUSCULAR; INTRAVENOUS at 14:16

## 2023-02-17 RX ADMIN — FENTANYL CITRATE 25 MCG: 50 INJECTION INTRAMUSCULAR; INTRAVENOUS at 14:39

## 2023-02-17 RX ADMIN — MIDAZOLAM 1 MG: 1 INJECTION INTRAMUSCULAR; INTRAVENOUS at 14:12

## 2023-02-17 NOTE — ANESTHESIA POSTPROCEDURE EVALUATION
Post-Op Assessment Note    No notable events documented      /77 (02/17/23 1615)    Temp 98 5 °F (36 9 °C) (02/17/23 1615)    Pulse 75 (02/17/23 1615)   Resp   12   SpO2   97

## 2023-02-17 NOTE — DISCHARGE INSTRUCTIONS
Salpingo-Oophorectomy   WHAT YOU NEED TO KNOW:   A salpingo-oophorectomy is surgery to remove one or both fallopian tubes and ovaries  The ovaries store and release eggs  The fallopian tubes carry the eggs from the ovaries to the uterus  DISCHARGE INSTRUCTIONS:   Call 911 for any of the following: You feel lightheaded, short of breath, and have chest pain  You cough up blood  Seek care immediately if:   Your arm or leg feels warm, tender, and painful  It may look swollen and red  Blood soaks through your bandage  Your stitches come apart  Call your doctor if:   You have a fever or chills  Your wound is red, swollen, or draining pus  You have pus or a foul-smelling odor coming from your vagina  You have nausea or are vomiting  You have trouble urinating or having a bowel movement  You have questions or concerns about your condition or care  Medicines: You may need any of the following:  NSAIDs , such as ibuprofen, help decrease swelling, pain, and fever  NSAIDs can cause stomach bleeding or kidney problems in certain people  If you take blood thinner medicine, always ask your healthcare provider if NSAIDs are safe for you  Always read the medicine label and follow directions  Prescription pain medicine  may be given  Ask your healthcare provider how to take this medicine safely  Some prescription pain medicines contain acetaminophen  Do not take other medicines that contain acetaminophen without talking to your healthcare provider  Too much acetaminophen may cause liver damage  Prescription pain medicine may cause constipation  Ask your healthcare provider how to prevent or treat constipation  Take your medicine as directed  Contact your healthcare provider if you think your medicine is not helping or if you have side effects  Tell your provider if you are allergic to any medicine  Keep a list of the medicines, vitamins, and herbs you take   Include the amounts, and when and why you take them  Bring the list or the pill bottles to follow-up visits  Carry your medicine list with you in case of an emergency  Care for your surgery area as directed:  When you are allowed to bathe, carefully wash around the area with soap and water  Gently dry the area and put on new, clean bandages as directed  Change your bandages when they get wet or dirty  If you have strips of medical tape, let them fall off on their own  Activity:  Ask your healthcare provider when you can return to your normal activities  Do not have sex, douche, or use tampons until your healthcare provider says it is okay  These actions may cause an infection  Do not exercise or lift anything heavy until your healthcare provider says it is okay  This may put too much stress on your incision  Ask for help: This surgery may be life-changing for you and your family  You will no longer be able to get pregnant if both of your ovaries are removed  Sudden changes in the levels of your hormones may occur and cause mood swings and depression  You may feel angry, sad, or frightened, or cry frequently and unexpectedly  These feelings are normal  Talk to your healthcare provider about where you can get support  Follow up with your doctor as directed:  Write down your questions so you remember to ask them during your visits  © Copyright [employer] 2022 Information is for End User's use only and may not be sold, redistributed or otherwise used for commercial purposes  The above information is an  only  It is not intended as medical advice for individual conditions or treatments  Talk to your doctor, nurse or pharmacist before following any medical regimen to see if it is safe and effective for you

## 2023-02-17 NOTE — INTERVAL H&P NOTE
H&P reviewed  After examining the patient I find no changes in the patients condition since the H&P had been written  Medical clearance was obtained on 2/13  Post op instr given  RTO 7-10 days  Percocet then Tylenol prn pain      Vitals:    02/17/23 1113   BP: 151/90   Pulse: 89   Resp: 18   Temp: 97 7 °F (36 5 °C)   SpO2: 98%

## 2023-02-17 NOTE — OP NOTE
OPERATIVE REPORT  PATIENT NAME: Brock Omer    :  1945  MRN: [id number]  Pt Location: [facility] OR ROOM 07    SURGERY DATE: 2023    Surgeon(s) and Role:     * Farideh Islas MD - Primary     * Andra Sandy MD - Assisting    Preop Diagnosis: Thickened endometrium [R93 89]  Left ovarian cyst [N83 202]    Post-Op Diagnosis Codes: * Thickened endometrium [R93 89]     * Left ovarian cyst [N83 202]    Procedure(s):  DILATATION AND CURETTAGE (D&C) WITH HYSTEROSCOPY  Bilateral - DIAGNOSTIC LAPAROSCOPY BILATERAL REMOVAL OF OVARIES AND FALLOPIAN TUBES  PELVIC WASHINGS     Specimen(s):  ID Type Source Tests Collected by Time Destination   1 : endocervical currettings Tissue Cervix, Endocervical TISSUE EXAM Farideh Islas MD 2023 1449    2 : endometrial polyp Tissue Polyp, Cervical/Endometrial TISSUE EXAM Farideh Islas MD 2023 1450    3 : endometrial currettings Tissue Endometrium TISSUE EXAM Farideh Islas MD 2023 1457    4 : pelvic washings for cytology Washing Pelvic Washing NON-GYNECOLOGIC CYTOLOGY Farideh Islas MD 2023 1527    5 : RIGHT OVARY AND FALLOPIAN TUBE Tissue Ovary, Right TISSUE EXAM Farideh Islas MD 2023 1546    6 : LEFT OVARY AND FALLOPIAN TUBE Tissue Ovary, Left TISSUE EXAM Farideh Islas MD 2023 1546        Estimated Blood Loss:   50cc    Operative Findings:  • External genitalia atrophic but otherwise grossly normal in appearance  No ulcerations, lacerations, or lesions  • Bimanual exam revealed uterus was anteverted and normal in size and consistency with no palpable uterine or adnexal masses  • Vagina and cervix were grossly normal in appearance without any lacerations or lesions  Vaginal atrophy and cervical stenosis noted  • Uterus sounded to 7 cm  • Hysteroscopic examination revealed thickened endometrial lining and 2 polyps, one with a cystic appearance and the other flesh colored    • On laparoscopy, grossly normal bowel, omentum, liver and upper abdomen  • Normal uterus with intramural fibroid arising from the lower left uterine body  Normal fallopian tubes bilaterally and normal right ovary  • Left ovary enlarged with a cystic component, adherent to the pelvic sidewall, encased within the mesosalpinx  • Bilateral ureters observed with vermiculation appreciated pre and post BSO  Operative Technique:  The patient was taken to the operating room  General endotracheal anesthesia (GET) was administered  Sequential compression devices were placed, and the patient was positioned on the OR table in the dorsal lithotomy position  All pressure points were padded, and a mary hugger was placed to maintain control of core body temperature  A bimanual exam was performed, and the uterus was anteverted and normal in size and consistency with no palpable uterine or adnexal masses  The patient was prepped and draped in the usual sterile fashion using chlorhexidine  A time out was performed to confirm correct patient and procedure  A Juárez catheter was inserted  A weighted speculum was inserted into the vagina, and a tara retractor was used to visualize the anterior lip of the cervix, which was then grasped with a single toothed tenaculum  The uterus was sounded to 7 cm  The cervix was serially dilated to for introduction of the hysteroscope and then Kevorkian curette was used for endocervical sampling  Hysteroscope was introduced under direct visualization using normal saline solution as the distention media  The speculum was removed from the vagina  The hysteroscope was advanced to the uterine fundus, and the entire uterine cavity was inspected in a systematic manner with findings noted above  The hysteroscope was withdrawn  Polyp forceps were then used to remove the previously visualized tissue which was sent to pathology   Sharp curetting was performed, starting at the 12 o'clock position and rotating a total of 360 degrees to cover all surfaces  Endometrial tissue was obtained and sent for pathology  A manipulator was inserted and secured to the tenaculum for uterine manipulation  Gown and gloves were exchanged and attention was turned to the abdomen  A 5 mm skin incision was made in the umbilicus with a scalpel for introduction of a 5 mm trochar under direct visualization  Good intraperitoneal location was confirmed and pneumoperitoneum was created to maximal pressure of 15 mmHg  The patient was placed in Trendelenburg and the above-mentioned findings were noted  Two additional trocars were placed in a similar fashion (5 mm on the right, 10 mm on the left) approximately 2cm superior and medial to the anterior superior iliac spine  Pelvic fluid was aspirated from the posterior cul-de-sac and additional washings were obtained and sent to pathology  The ureters were clearly identified transperitoneally on both sides  Attention was first turned to the right adnexa  The  infundibulopelvic ligament was identified and divided with the Enseal device  The right fallopian tube was grasped at its fimbriated end with a [place] grasper and elevated to visualize the mesosalpinx  Enseal device was used to ligate along the mesosalpinx, working proximally  The right utero-ovarian vessel was divided using the Enseal device  The right adnexa was transected approximately 2cm from the level of the cornua  Care was taken to confirm the ureter was a safe distance away before activating the electrocautery  The area was observed following transection and found to be hemostatic  Attention was then turned to the contralateral adnexal structures, which were amputated in similar fashion  Additional dissection was performed using the Enseal device to separate the left ovary and cyst away from the mesosalpinx and isolate the infundibulopelvic ligament  Again, care was taken to confirm the ureter was a safe distance away prior to activating electrocautery  Excellent hemostasis was obtained  The bilateral adnexal structures were removed intact using the Endocatch bag  The pelvis was irrigated and surgical sites were again noted to be hemostatic  Bilateral ureters were again identified and vermiculation was appreciated  Pneumoperitoneum was allowed to escape  Trocars were removed from the abdomen  The 10 mm fascial incision was closed with 0 Vicryl using the Delroy-Perez device under direct visualization  The skin incisions were closed using 4-0 Monocryl and covered with exofin  Attention was then turned back to the pelvis  The manipulator and tenaculum were removed  Tenaculum sites were hemostatic  Ujárez catheter was removed  At the conclusion of the procedure, all needle, sponge, and instrument counts were noted to be correct x2  Patient tolerated the procedure well and was transferred to PACU in stable condition with plan for same day discharge  She was instructed to follow up with Dr Sancho Allen as scheduled        SIGNATURE: Dyann Schaumann, MD  DATE: February 17, 2023  TIME: 4:44 PM

## 2023-02-17 NOTE — ANESTHESIA PREPROCEDURE EVALUATION
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     Endometriosis  Arthritis    GERD (gastroesophageal reflux disease)  Colitis           Physical Exam    Airway    Mallampati score: II  TM Distance: >3 FB  Neck ROM: full     Dental       Cardiovascular      Pulmonary      Other Findings        Anesthesia Plan  ASA Score- 2     Anesthesia Type- general with ASA Monitors  Additional Monitors:   Airway Plan: ETT  Plan Factors-    Chart reviewed  Induction- intravenous  Postoperative Plan-     Informed Consent- Anesthetic plan and risks discussed with patient  I personally reviewed this patient with the CRNA  Discussed and agreed on the Anesthesia Plan with the CRNA  Russell Serna

## 2023-02-21 ENCOUNTER — TELEPHONE (OUTPATIENT)
Dept: OBGYN CLINIC | Facility: CLINIC | Age: 78
End: 2023-02-21

## 2023-02-21 NOTE — TELEPHONE ENCOUNTER
Spoke to pt and this morning had dark red and scant spotting when going to bathroom  No fever  Explained probably no concern but will send over to Dr Melinda Rodríguez for her

## 2023-02-21 NOTE — TELEPHONE ENCOUNTER
Carolynn Sheth called today concerned that she had vaginal bleeding  She had bleeding the night of surgery on February 17 and that stopped  She thought there might be something wrong since it restarted today  I reassured her that nothing was wrong and that this was normal   She has only used Tylenol for analgesia since Friday  She never used the Percocet  She is sleeping 8 hours  She is doing well  She has an appointment on Thursday

## 2023-02-21 NOTE — TELEPHONE ENCOUNTER
2/17 D&C with Dr Dick Olmedo    If pt calls back can speak to anyone in triage  Lm to cb to discuss

## 2023-02-22 PROBLEM — D27.9: Status: ACTIVE | Noted: 2023-02-22

## 2023-02-22 PROBLEM — N84.0 ENDOMETRIAL POLYP: Status: ACTIVE | Noted: 2023-02-22

## 2023-02-22 PROBLEM — Z48.89 POSTOPERATIVE VISIT: Status: ACTIVE | Noted: 2023-02-22

## 2023-02-22 NOTE — PROGRESS NOTES
Assessment/Plan:  Normal postoperative visit  Return to the office in 1 year for an annual visit  Pathology revealed 2 endometrial polyps and a seromucinous cystadenoma of the left ovary  The right ovary was likewise benign  Pelvic washings are still pending       Diagnoses and all orders for this visit:    Postoperative visit    Endometrial polyps    Seromucinous cystadenoma of ovary    Subserous leiomyoma of uterus              Subjective:        Patient ID: Brandon Ryan is a 78 y o  female  Opal Maxin is doing very well  She has no complaints  She has normal bowel and urinary function  She no longer is taking any analgesics and only took plain Tylenol postoperatively  She is not sexually active  She may begin walking today and then exercising next week  I would like to see her in 1 year for an annual visit  The pathology report was reviewed  Pictures from the surgery were also reviewed and explained  She has a 3 cm subserosal fibroid in the right fundus which can be observed  The following portions of the patient's history were reviewed and updated as appropriate: She  has a past medical history of Arthritis, Colitis, Endometriosis, and GERD (gastroesophageal reflux disease)  Patient Active Problem List    Diagnosis Date Noted   • Subserous leiomyoma of uterus 02/23/2023   • Postoperative visit 02/22/2023   • Endometrial polyps 02/22/2023   • Seromucinous cystadenoma of ovary 02/22/2023   • Abnormal EKG 02/13/2023   • Neoplasm of uncertain behavior of ovary, 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   • Colitis 11/06/2022   • Increased endometrial stripe thickness 11/06/2022   • 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 01/31/2019   • Irritable bowel syndrome 01/07/2016     She  has a past surgical history that includes Tonsillectomy; Colpopexy; Blepharoptosis repair (Bilateral);  Colonoscopy (01/04/2023); pr hysteroscopy bx endometrium&/polypc w/wo d&c (N/A, 2/17/2023); and pr laparoscopy w/rmvl adnexal structures (Bilateral, 2/17/2023)  Her family history includes Atrial fibrillation in her brother and mother; Breast cancer in her paternal aunt; Dementia in her mother; Heart failure in her mother; Pancreatic cancer in her father; Sleep apnea in her brother  She  reports that she has never smoked  She has never used smokeless tobacco  She reports current alcohol use of about 7 0 standard drinks per week  She reports that she does not use drugs  Current Outpatient Medications   Medication Sig Dispense Refill   • atorvastatin (LIPITOR) 10 mg tablet TAKE 1 TABLET BY MOUTH EVERY DAY 90 tablet 3   • B Complex Vitamins (VITAMIN B COMPLEX 100 IJ)      • Calcium-Magnesium 500-250 MG TABS Take by mouth Take one tablet by mouth daily  • cholecalciferol (VITAMIN D3) 1,000 units tablet Take 1,000 Units by mouth daily     • ibuprofen (MOTRIN) 600 mg tablet Take 1 tablet (600 mg total) by mouth every 6 (six) hours as needed for mild pain for up to 15 days 30 tablet 0   • Omega-3 Fatty Acids (OMEGA 3 PO) Take by mouth Patient takes 2 tablets daily     • pantoprazole (PROTONIX) 40 mg tablet Take by mouth daily       • Probiotic Product (PROBIOTIC PO) Take by mouth Take one tablet by mouth daily  No current facility-administered medications for this visit  Current Outpatient Medications on File Prior to Visit   Medication Sig   • atorvastatin (LIPITOR) 10 mg tablet TAKE 1 TABLET BY MOUTH EVERY DAY   • B Complex Vitamins (VITAMIN B COMPLEX 100 IJ)    • Calcium-Magnesium 500-250 MG TABS Take by mouth Take one tablet by mouth daily     • cholecalciferol (VITAMIN D3) 1,000 units tablet Take 1,000 Units by mouth daily   • ibuprofen (MOTRIN) 600 mg tablet Take 1 tablet (600 mg total) by mouth every 6 (six) hours as needed for mild pain for up to 15 days   • Omega-3 Fatty Acids (OMEGA 3 PO) Take by mouth Patient takes 2 tablets daily   • pantoprazole (PROTONIX) 40 mg tablet Take by mouth daily     • Probiotic Product (PROBIOTIC PO) Take by mouth Take one tablet by mouth daily  • [DISCONTINUED] acetaminophen (TYLENOL) 325 mg tablet Take 2 tablets (650 mg total) by mouth every 6 (six) hours as needed for mild pain   • [DISCONTINUED] oxyCODONE-acetaminophen (PERCOCET) 5-325 mg per tablet Take 1-2 tablets by mouth every 6 (six) hours as needed for moderate pain for up to 10 days Max Daily Amount: 8 tablets     No current facility-administered medications on file prior to visit  She has No Known Allergies       Review of Systems   Constitutional: Negative  Respiratory: Negative for shortness of breath  Cardiovascular: Negative for chest pain  Gastrointestinal: Negative for abdominal distention, abdominal pain, constipation, nausea and vomiting  Genitourinary: Positive for vaginal bleeding  Negative for pelvic pain and vaginal discharge  Objective:    Vitals:    02/23/23 0832   BP: 130/86   BP Location: Left arm   Patient Position: Sitting   Cuff Size: Standard   Weight: 75 3 kg (166 lb)   Height: 5' 7" (1 702 m)            Physical Exam  Vitals and nursing note reviewed  Constitutional:       Appearance: Normal appearance  HENT:      Head: Normocephalic and atraumatic  Eyes:      General: No scleral icterus  Right eye: No discharge  Left eye: No discharge  Extraocular Movements: Extraocular movements intact  Abdominal:      General: Abdomen is flat  There is no distension  Palpations: Abdomen is soft  There is no mass  Tenderness: There is no abdominal tenderness  Comments: Incisions healing well  Skin:     General: Skin is warm and dry  Neurological:      Mental Status: She is alert and oriented to person, place, and time  Psychiatric:         Mood and Affect: Mood normal          Behavior: Behavior normal          Thought Content:  Thought content normal          Judgment: Judgment normal

## 2023-02-23 ENCOUNTER — OFFICE VISIT (OUTPATIENT)
Dept: OBGYN CLINIC | Facility: CLINIC | Age: 78
End: 2023-02-23

## 2023-02-23 VITALS
DIASTOLIC BLOOD PRESSURE: 86 MMHG | WEIGHT: 166 LBS | HEIGHT: 67 IN | BODY MASS INDEX: 26.06 KG/M2 | SYSTOLIC BLOOD PRESSURE: 130 MMHG

## 2023-02-23 DIAGNOSIS — D25.2 SUBSEROUS LEIOMYOMA OF UTERUS: ICD-10-CM

## 2023-02-23 DIAGNOSIS — D27.9: ICD-10-CM

## 2023-02-23 DIAGNOSIS — N84.0 ENDOMETRIAL POLYP: ICD-10-CM

## 2023-02-23 DIAGNOSIS — Z48.89 POSTOPERATIVE VISIT: Primary | ICD-10-CM

## 2023-02-23 NOTE — LETTER
February 23, 2023     Sherice Sanchez MD  [address] [address]    Patient: Fred Sandy   YOB: 1945   Date of Visit: 2/23/2023       [facility],    Thank you for referring Nirav Knight  She underwent a hysteroscopy with D&C and endometrial polypectomy as well as diagnostic laparotomy and bilateral salpingo-oophorectomy for an abnormal left ovary  Her preoperative  was 16  Pelvic washings are still pending but the final pathology report revealed 2 benign endometrial polyps and a serum mucinous cyst of the left ovary  The right ovary was benign as well  At for this consultation  If you have questions, please do not hesitate to call me  I asked her to return to see me for an annual visit in 1 year  I will be retiring in June 2024  At least that is the plan right now  Take care,        Caleb Reed MD        CC: No Recipients  Caleb Reed MD  2/23/2023  8:56 AM  Incomplete  Assessment/Plan:      Pathology revealed 2 endometrial polyps and a seromucinous cystadenoma of the left ovary  The right ovary was likewise benign  Pelvic washings are still pending      Diagnoses and all orders for this visit:    Postoperative visit    Endometrial polyps    Seromucinous cystadenoma of ovary    Subserous leiomyoma of uterus             Subjective:       Patient ID: Fred Sandy is a 78 y o  female  Veronia Evette is doing very well  She has no complaints  She has normal bowel and urinary function  She no longer is taking any analgesics and only took plain Tylenol postoperatively  She is not sexually active  She may begin walking today and then exercising next week  I would like to see her in 1 year for an annual visit  The pathology report was reviewed  Pictures from the surgery were also reviewed and explained  She has a 3 cm subserosal fibroid in the right fundus which can be observed        The following portions of the patient's history were reviewed and updated as appropriate: She  has a past medical history of Arthritis, Colitis, Endometriosis, and GERD (gastroesophageal reflux disease)  Patient Active Problem List    Diagnosis Date Noted   • Subserous leiomyoma of uterus 02/23/2023   • Postoperative visit 02/22/2023   • Endometrial polyps 02/22/2023   • Seromucinous cystadenoma of ovary 02/22/2023   • Abnormal EKG 02/13/2023   • Neoplasm of uncertain behavior of ovary, 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   • Colitis 11/06/2022   • Increased endometrial stripe thickness 11/06/2022   • 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 01/31/2019   • Irritable bowel syndrome 01/07/2016     She  has a past surgical history that includes Tonsillectomy; Colpopexy; Blepharoptosis repair (Bilateral); Colonoscopy (01/04/2023); pr hysteroscopy bx endometrium&/polypc w/wo d&c (N/A, 2/17/2023); and pr laparoscopy w/rmvl adnexal structures (Bilateral, 2/17/2023)  Her family history includes Atrial fibrillation in her brother and mother; Breast cancer in her paternal aunt; Dementia in her mother; Heart failure in her mother; Pancreatic cancer in her father; Sleep apnea in her brother  She  reports that she has never smoked  She has never used smokeless tobacco  She reports current alcohol use of about 7 0 standard drinks per week  She reports that she does not use drugs  Current Outpatient Medications   Medication Sig Dispense Refill   • atorvastatin (LIPITOR) 10 mg tablet TAKE 1 TABLET BY MOUTH EVERY DAY 90 tablet 3   • B Complex Vitamins (VITAMIN B COMPLEX 100 IJ)      • Calcium-Magnesium 500-250 MG TABS Take by mouth Take one tablet by mouth daily       • cholecalciferol (VITAMIN D3) 1,000 units tablet Take 1,000 Units by mouth daily     • ibuprofen (MOTRIN) 600 mg tablet Take 1 tablet (600 mg total) by mouth every 6 (six) hours as needed for mild pain for up to 15 days 30 tablet 0   • Omega-3 Fatty Acids (OMEGA 3 PO) Take by mouth Patient takes 2 tablets daily     • pantoprazole (PROTONIX) 40 mg tablet Take by mouth daily       • Probiotic Product (PROBIOTIC PO) Take by mouth Take one tablet by mouth daily  No current facility-administered medications for this visit  Current Outpatient Medications on File Prior to Visit   Medication Sig   • atorvastatin (LIPITOR) 10 mg tablet TAKE 1 TABLET BY MOUTH EVERY DAY   • B Complex Vitamins (VITAMIN B COMPLEX 100 IJ)    • Calcium-Magnesium 500-250 MG TABS Take by mouth Take one tablet by mouth daily  • cholecalciferol (VITAMIN D3) 1,000 units tablet Take 1,000 Units by mouth daily   • ibuprofen (MOTRIN) 600 mg tablet Take 1 tablet (600 mg total) by mouth every 6 (six) hours as needed for mild pain for up to 15 days   • Omega-3 Fatty Acids (OMEGA 3 PO) Take by mouth Patient takes 2 tablets daily   • pantoprazole (PROTONIX) 40 mg tablet Take by mouth daily     • Probiotic Product (PROBIOTIC PO) Take by mouth Take one tablet by mouth daily  • [DISCONTINUED] acetaminophen (TYLENOL) 325 mg tablet Take 2 tablets (650 mg total) by mouth every 6 (six) hours as needed for mild pain   • [DISCONTINUED] oxyCODONE-acetaminophen (PERCOCET) 5-325 mg per tablet Take 1-2 tablets by mouth every 6 (six) hours as needed for moderate pain for up to 10 days Max Daily Amount: 8 tablets     No current facility-administered medications on file prior to visit  She has No Known Allergies       Review of Systems   Constitutional: Negative  Respiratory: Negative for shortness of breath  Cardiovascular: Negative for chest pain  Gastrointestinal: Negative for abdominal distention, abdominal pain, constipation, nausea and vomiting  Genitourinary: Positive for vaginal bleeding  Negative for pelvic pain and vaginal discharge          Objective:    Vitals:    02/23/23 0832   BP: 130/86   BP Location: Left arm   Patient Position: Sitting   Cuff Size: Standard   Weight: 75 3 kg (166 lb)   Height: 5' 7" (1 702 m)           Physical Exam  Vitals and nursing note reviewed  Constitutional:       Appearance: Normal appearance  HENT:      Head: Normocephalic and atraumatic  Eyes:      General: No scleral icterus  Right eye: No discharge  Left eye: No discharge  Extraocular Movements: Extraocular movements intact  Abdominal:      General: Abdomen is flat  There is no distension  Palpations: Abdomen is soft  There is no mass  Tenderness: There is no abdominal tenderness  Comments: Incisions healing well  Skin:     General: Skin is warm and dry  Neurological:      Mental Status: She is alert and oriented to person, place, and time  Psychiatric:         Mood and Affect: Mood normal          Behavior: Behavior normal          Thought Content: Thought content normal          Judgment: Judgment normal           Kimberly Prose  2/23/2023  8:03 AM  Sign when Signing Visit  Patient is here for follow up for Jim Knight MD  2/22/2023  1:40 PM  Sign when Signing Visit  Assessment/Plan:      Allergy revealed 2 9 endometrial polyps and a seromucinous cystadenoma of the left ovary  The right ovary was likewise benign  Pelvic washings are still pending      Diagnoses and all orders for this visit:    Postoperative visit    Endometrial polyps    Seromucinous cystadenoma of ovary             Subjective:       Patient ID: Chelo Almendarez is a 78 y o  female  HPI    The following portions of the patient's history were reviewed and updated as appropriate: She  has a past medical history of Arthritis, Colitis, Endometriosis, and GERD (gastroesophageal reflux disease)    Patient Active Problem List    Diagnosis Date Noted   • Postoperative visit 02/22/2023   • Endometrial polyps 02/22/2023   • Seromucinous cystadenoma of ovary 02/22/2023   • Abnormal EKG 02/13/2023   • Neoplasm of uncertain behavior of ovary, 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   • Colitis 11/06/2022   • Increased endometrial stripe thickness 11/06/2022   • 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 01/31/2019   • Irritable bowel syndrome 01/07/2016     She  has a past surgical history that includes Tonsillectomy; Colpopexy; Blepharoptosis repair (Bilateral); Colonoscopy (01/04/2023); pr hysteroscopy bx endometrium&/polypc w/wo d&c (N/A, 2/17/2023); and pr laparoscopy w/rmvl adnexal structures (Bilateral, 2/17/2023)  Her family history includes Atrial fibrillation in her brother and mother; Breast cancer in her paternal aunt; Dementia in her mother; Heart failure in her mother; Pancreatic cancer in her father; Sleep apnea in her brother  She  reports that she has never smoked  She has never used smokeless tobacco  She reports current alcohol use of about 7 0 standard drinks per week  She reports that she does not use drugs  Current Outpatient Medications   Medication Sig Dispense Refill   • acetaminophen (TYLENOL) 325 mg tablet Take 2 tablets (650 mg total) by mouth every 6 (six) hours as needed for mild pain 60 tablet 0   • atorvastatin (LIPITOR) 10 mg tablet TAKE 1 TABLET BY MOUTH EVERY DAY 90 tablet 3   • B Complex Vitamins (VITAMIN B COMPLEX 100 IJ)      • Calcium-Magnesium 500-250 MG TABS Take by mouth Take one tablet by mouth daily  • cholecalciferol (VITAMIN D3) 1,000 units tablet Take 1,000 Units by mouth daily     • ibuprofen (MOTRIN) 600 mg tablet Take 1 tablet (600 mg total) by mouth every 6 (six) hours as needed for mild pain for up to 15 days 30 tablet 0   • Omega-3 Fatty Acids (OMEGA 3 PO) Take by mouth Patient takes 2 tablets daily     • oxyCODONE-acetaminophen (PERCOCET) 5-325 mg per tablet Take 1-2 tablets by mouth every 6 (six) hours as needed for moderate pain for up to 10 days Max Daily Amount: 8 tablets 8 tablet 0   • pantoprazole (PROTONIX) 40 mg tablet Take by mouth daily       • Probiotic Product (PROBIOTIC PO) Take by mouth Take one tablet by mouth daily  No current facility-administered medications for this visit       Current Outpatient Medications on File Prior to Visit   Medication Sig   • acetaminophen (TYLENOL) 325 mg tablet Take 2 tablets (650 mg total) by mouth every 6 (six) hours as needed for mild pain   • atorvastatin (LIPITOR) 10 mg tablet TAKE 1 TABLET BY MOUTH EVERY DAY   • B Complex Vitamins (VITAMIN B COMPLEX 100 IJ)    • Calcium-Magnesium 500-250 MG TABS Take by mouth Take one tablet by mouth daily  • cholecalciferol (VITAMIN D3) 1,000 units tablet Take 1,000 Units by mouth daily   • ibuprofen (MOTRIN) 600 mg tablet Take 1 tablet (600 mg total) by mouth every 6 (six) hours as needed for mild pain for up to 15 days   • Omega-3 Fatty Acids (OMEGA 3 PO) Take by mouth Patient takes 2 tablets daily   • oxyCODONE-acetaminophen (PERCOCET) 5-325 mg per tablet Take 1-2 tablets by mouth every 6 (six) hours as needed for moderate pain for up to 10 days Max Daily Amount: 8 tablets   • pantoprazole (PROTONIX) 40 mg tablet Take by mouth daily     • Probiotic Product (PROBIOTIC PO) Take by mouth Take one tablet by mouth daily  No current facility-administered medications on file prior to visit  She has No Known Allergies       Review of Systems     Objective: There were no vitals filed for this visit          Physical Exam

## 2023-02-23 NOTE — LETTER
Leigh Theodore,    Thank you for referring Wong Plascencia  She underwent a hysteroscopy with D&C and endometrial polypectomy as well as diagnostic laparotomy and bilateral salpingo-oophorectomy for an abnormal left ovary  Her preoperative  was 16  Pelvic washings are still pending but the final pathology report revealed 2 benign endometrial polyps and a seromucinous cyst of the left ovary  The right ovary was benign as well  If you have questions, please do not hesitate to call me  I asked her to return to see me for an annual visit in 1 year  I will be retiring in June 2024  At least that is the plan right now           Take care,        Rey Johnson MD

## 2024-02-07 ENCOUNTER — RA CDI HCC (OUTPATIENT)
Dept: OTHER | Facility: HOSPITAL | Age: 79
End: 2024-02-07

## 2024-02-14 ENCOUNTER — OFFICE VISIT (OUTPATIENT)
Dept: FAMILY MEDICINE CLINIC | Facility: CLINIC | Age: 79
End: 2024-02-14
Payer: COMMERCIAL

## 2024-02-14 VITALS
WEIGHT: 166.2 LBS | BODY MASS INDEX: 25.19 KG/M2 | SYSTOLIC BLOOD PRESSURE: 132 MMHG | HEIGHT: 68 IN | RESPIRATION RATE: 16 BRPM | OXYGEN SATURATION: 97 % | HEART RATE: 80 BPM | DIASTOLIC BLOOD PRESSURE: 78 MMHG | TEMPERATURE: 99.5 F

## 2024-02-14 DIAGNOSIS — Z13.820 OSTEOPOROSIS SCREENING: ICD-10-CM

## 2024-02-14 DIAGNOSIS — Z00.00 MEDICARE ANNUAL WELLNESS VISIT, SUBSEQUENT: Primary | ICD-10-CM

## 2024-02-14 DIAGNOSIS — Z78.0 ASYMPTOMATIC MENOPAUSAL STATE: ICD-10-CM

## 2024-02-14 DIAGNOSIS — Z12.31 ENCOUNTER FOR SCREENING MAMMOGRAM FOR BREAST CANCER: ICD-10-CM

## 2024-02-14 DIAGNOSIS — K22.70 BARRETT'S ESOPHAGUS WITHOUT DYSPLASIA: ICD-10-CM

## 2024-02-14 DIAGNOSIS — Z23 ENCOUNTER FOR IMMUNIZATION: ICD-10-CM

## 2024-02-14 DIAGNOSIS — K21.9 GASTROESOPHAGEAL REFLUX DISEASE WITHOUT ESOPHAGITIS: ICD-10-CM

## 2024-02-14 PROBLEM — R94.31 ABNORMAL EKG: Status: RESOLVED | Noted: 2023-02-13 | Resolved: 2024-02-14

## 2024-02-14 PROBLEM — N84.0 ENDOMETRIAL POLYP: Status: RESOLVED | Noted: 2023-02-22 | Resolved: 2024-02-14

## 2024-02-14 PROBLEM — Z48.89 POSTOPERATIVE VISIT: Status: RESOLVED | Noted: 2023-02-22 | Resolved: 2024-02-14

## 2024-02-14 PROBLEM — D27.9: Status: RESOLVED | Noted: 2023-02-22 | Resolved: 2024-02-14

## 2024-02-14 PROBLEM — K52.9 COLITIS: Status: RESOLVED | Noted: 2022-11-06 | Resolved: 2024-02-14

## 2024-02-14 PROBLEM — R93.89 INCREASED ENDOMETRIAL STRIPE THICKNESS: Status: RESOLVED | Noted: 2022-11-06 | Resolved: 2024-02-14

## 2024-02-14 PROBLEM — D39.12: Status: RESOLVED | Noted: 2023-01-10 | Resolved: 2024-02-14

## 2024-02-14 PROBLEM — D25.2 SUBSEROUS LEIOMYOMA OF UTERUS: Status: RESOLVED | Noted: 2023-02-23 | Resolved: 2024-02-14

## 2024-02-14 PROCEDURE — G0439 PPPS, SUBSEQ VISIT: HCPCS | Performed by: FAMILY MEDICINE

## 2024-02-14 RX ORDER — MELATONIN/PYRIDOXINE HCL (B6) 5 MG-10 MG
100 TABLET,IMMED, EXTENDED RELEASE, BIPHASIC ORAL DAILY
COMMUNITY

## 2024-02-14 RX ORDER — PANTOPRAZOLE SODIUM 40 MG/1
40 TABLET, DELAYED RELEASE ORAL DAILY
Start: 2024-02-14

## 2024-02-14 NOTE — PROGRESS NOTES
Assessment and Plan:     Problem List Items Addressed This Visit        Digestive    Armadno's esophagus without dysplasia     Egd next year  On pantoprazole   Sees Dr Parks         Relevant Medications    pantoprazole (PROTONIX) 40 mg tablet    GERD (gastroesophageal reflux disease)     On pantoprazole         Relevant Medications    pantoprazole (PROTONIX) 40 mg tablet   Other Visit Diagnoses     Medicare annual wellness visit, subsequent    -  Primary    Relevant Orders    CBC and differential    Comprehensive metabolic panel    Lipid panel    Encounter for screening mammogram for breast cancer        Relevant Orders    Mammo screening bilateral w 3d & cad    Encounter for immunization        Osteoporosis screening        Relevant Orders    DXA bone density spine hip and pelvis    Asymptomatic menopausal state        Relevant Orders    DXA bone density spine hip and pelvis             Preventive health issues were discussed with patient, and age appropriate screening tests were ordered as noted in patient's After Visit Summary.  Personalized health advice and appropriate referrals for health education or preventive services given if needed, as noted in patient's After Visit Summary.     History of Present Illness:     Patient presents for a Medicare Wellness Visit    HPI   Here for medicare wellness  Feels well overall      Patient Care Team:  Nicole Davenport MD as PCP - General (Family Medicine)  Nicole Davenport MD (Family Medicine)     Review of Systems:     Review of Systems   Constitutional: Negative.    HENT: Negative.     Eyes: Negative.    Respiratory: Negative.     Cardiovascular: Negative.    Gastrointestinal: Negative.    Endocrine: Negative.    Genitourinary: Negative.    Musculoskeletal: Negative.    Skin: Negative.    Allergic/Immunologic: Negative.    Neurological: Negative.    Hematological: Negative.    Psychiatric/Behavioral: Negative.          Problem List:     Patient Active Problem List    Diagnosis   • 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   • Irritable bowel syndrome   • GERD (gastroesophageal reflux disease)      Past Medical and Surgical History:     Past Medical History:   Diagnosis Date   • Arthritis    • Colitis    • Endometriosis    • GERD (gastroesophageal reflux disease)      Past Surgical History:   Procedure Laterality Date   • BELPHAROPTOSIS REPAIR Bilateral    • COLONOSCOPY  01/04/2023    polyp- 5 years   • COLPOPEXY      laparoscopic   • MI HYSTEROSCOPY BX ENDOMETRIUM&/POLYPC W/WO D&C N/A 2/17/2023    Procedure: DILATATION AND CURETTAGE (D&C) WITH HYSTEROSCOPY;  Surgeon: Pablo Pappas MD;  Location: [facility];  Service: Gynecology   • MI LAPAROSCOPY W/RMVL ADNEXAL STRUCTURES Bilateral 2/17/2023    Procedure: DIAGNOSTIC LAPAROSCOPY BILATERAL REMOVAL OF OVARIES AND FALLOPIAN TUBES, PELVIC WASHINGS AND ANY OTHER NECESSARY PROCEDURES;  Surgeon: Pablo Pappas MD;  Location: [facility];  Service: Gynecology   • TONSILLECTOMY        Family History:     Family History   Problem Relation Age of Onset   • Heart failure Mother         Death at 88 years   • Dementia Mother    • Atrial fibrillation Mother    • Pancreatic cancer Father    • Sleep apnea Brother    • Atrial fibrillation Brother    • Breast cancer Paternal Aunt       Social History:     Social History     Socioeconomic History   • Marital status: /Civil Union     Spouse name: None   • Number of children: None   • Years of education: None   • Highest education level: None   Occupational History   • None   Tobacco Use   • Smoking status: Former     Types: Cigarettes   • Smokeless tobacco: Never   Vaping Use   • Vaping status: Never Used   Substance and Sexual Activity   • Alcohol use: Yes     Alcohol/week: 5.0 standard drinks of alcohol     Types: 5 Glasses of wine per week   • Drug use: No   • Sexual activity: Not Currently   Other Topics Concern   • None   Social History Narrative   • None     Social Determinants of Health      Financial Resource Strain: Low Risk  (2/7/2024)    Overall Financial Resource Strain (CARDIA)    • Difficulty of Paying Living Expenses: Not hard at all   Food Insecurity: Not on file   Transportation Needs: No Transportation Needs (2/7/2024)    PRAPARE - Transportation    • Lack of Transportation (Medical): No    • Lack of Transportation (Non-Medical): No   Physical Activity: Not on file   Stress: Not on file   Social Connections: Not on file   Intimate Partner Violence: Not on file   Housing Stability: Not on file      Medications and Allergies:     Current Outpatient Medications   Medication Sig Dispense Refill   • B Complex Vitamins (VITAMIN B COMPLEX 100 IJ)      • Calcium-Magnesium 500-250 MG TABS Take by mouth Take one tablet by mouth daily.     • cholecalciferol (VITAMIN D3) 1,000 units tablet Take 5,000 Units by mouth daily     • Coenzyme Q10 (CoQ-10) 100 MG capsule Take 100 mg by mouth daily     • Omega-3 Fatty Acids (OMEGA 3 PO) Take by mouth Patient takes 2 tablets daily     • pantoprazole (PROTONIX) 40 mg tablet Take 1 tablet (40 mg total) by mouth daily     • Probiotic Product (PROBIOTIC PO) Take by mouth Take one tablet by mouth daily.       No current facility-administered medications for this visit.     No Known Allergies   Immunizations:     Immunization History   Administered Date(s) Administered   • COVID-19 PFIZER VACCINE 0.3 ML IM 03/02/2021, 03/22/2021, 12/17/2021   • INFLUENZA 10/18/2018, 12/15/2018   • Influenza, high dose seasonal 0.7 mL 02/09/2022, 11/14/2022   • Pneumococcal Conjugate 13-Valent 02/05/2020   • Pneumococcal Conjugate PCV 7 01/24/2015   • Pneumococcal Polysaccharide PPV23 01/31/2019   • Tdap 01/24/2014   • Zoster 05/14/2014, 01/24/2015   • Zoster Vaccine Recombinant 03/06/2022, 05/24/2022      Health Maintenance:         Topic Date Due   • Breast Cancer Screening: Mammogram  Never done   • Hepatitis C Screening  Completed   • Colorectal Cancer Screening  Discontinued          Topic Date Due   • Influenza Vaccine (1) 09/01/2023   • COVID-19 Vaccine (4 - 2023-24 season) 09/01/2023      Medicare Screening Tests and Risk Assessments:     Keila is here for her Subsequent Wellness visit. Last Medicare Wellness visit information reviewed, patient interviewed, no change since last AWV.     Health Risk Assessment:   Patient rates overall health as very good. Patient feels that their physical health rating is slightly better. Patient is very satisfied with their life. Eyesight was rated as same. Hearing was rated as same. Patient feels that their emotional and mental health rating is slightly better. Patients states they are never, rarely angry. Patient states they are never, rarely unusually tired/fatigued. Pain experienced in the last 7 days has been none. Patient states that she has experienced no weight loss or gain in last 6 months.     Depression Screening:   PHQ-2 Score: 0      Fall Risk Screening:   In the past year, patient has experienced: no history of falling in past year      Urinary Incontinence Screening:   Patient has not leaked urine accidently in the last six months.     Home Safety:  Patient does not have trouble with stairs inside or outside of their home. Patient has working smoke alarms and has working carbon monoxide detector. Home safety hazards include: none.     Nutrition:   Current diet is Regular and Low Cholesterol.     Medications:   Patient is currently taking over-the-counter supplements. OTC medications include: see medication list. Patient is able to manage medications.     Activities of Daily Living (ADLs)/Instrumental Activities of Daily Living (IADLs):   Walk and transfer into and out of bed and chair?: Yes  Dress and groom yourself?: Yes    Bathe or shower yourself?: Yes    Feed yourself? Yes  Do your laundry/housekeeping?: Yes  Manage your money, pay your bills and track your expenses?: Yes  Make your own meals?: Yes    Do your own shopping?:  Yes    Previous Hospitalizations:   Any hospitalizations or ED visits within the last 12 months?: Yes    How many hospitalizations have you had in the last year?: 1-2    Advance Care Planning:   Living will: No    Durable POA for healthcare: No    Advanced directive: No      PREVENTIVE SCREENINGS      Cardiovascular Screening:    General: Screening Current      Diabetes Screening:     General: Screening Current      Cervical Cancer Screening:    General: Screening Not Indicated      Lung Cancer Screening:     General: Screening Not Indicated      Hepatitis C Screening:    General: Screening Current    Screening, Brief Intervention, and Referral to Treatment (SBIRT)    Screening  Typical number of drinks in a day: 1  Typical number of drinks in a week: 5  Interpretation: Low risk drinking behavior.    AUDIT-C Screenin) How often did you have a drink containing alcohol in the past year? 4 or more times a week  2) How many drinks did you have on a typical day when you were drinking in the past year? 1 to 2  3) How often did you have 6 or more drinks on one occasion in the past year? never    AUDIT-C Score: 4  Interpretation: Score 3-12 (female): POSITIVE screen for alcohol misuse    AUDIT Screenin) How often during the last year have you found that you were not able to stop drinking once you had started? 0 - never  5) How often during the last year have you failed to do what was normally expected from you because of drinking? 0 - never  6) How often during the last year have you needed a first drink in the morning to get yourself going after a heavy drinking session? 0 - never  7) How often during the last year have you had a feeling of guilt or remorse after drinking? 0 - never  8) How often during the last year have you been unable to remember what happened the night before because you had been drinking? 0 - never  9) Have you or someone else been injured as a result of your drinking? 0 - no  10) Has a  "relative or friend or a doctor or another health worker been concerned about your drinking or suggested you cut down? 0 - no    AUDIT Score: 4  Interpretation: Low risk alcohol consumption    Single Item Drug Screening:  How often have you used an illegal drug (including marijuana) or a prescription medication for non-medical reasons in the past year? never    Single Item Drug Screen Score: 0  Interpretation: Negative screen for possible drug use disorder    No results found.     Physical Exam:     /78 (BP Location: Left arm, Patient Position: Sitting, Cuff Size: Standard)   Pulse 80   Temp 99.5 °F (37.5 °C) (Tympanic)   Resp 16   Ht 5' 8\" (1.727 m)   Wt 75.4 kg (166 lb 3.2 oz)   SpO2 97%   BMI 25.27 kg/m²     Physical Exam  Vitals and nursing note reviewed.   Constitutional:       General: She is not in acute distress.     Appearance: Normal appearance. She is well-developed.   HENT:      Head: Normocephalic and atraumatic.      Right Ear: Tympanic membrane normal.      Left Ear: Tympanic membrane normal.      Nose: Nose normal.      Mouth/Throat:      Mouth: Mucous membranes are moist.   Eyes:      Conjunctiva/sclera: Conjunctivae normal.   Cardiovascular:      Rate and Rhythm: Normal rate and regular rhythm.      Pulses: Normal pulses.      Heart sounds: Normal heart sounds. No murmur heard.  Pulmonary:      Effort: Pulmonary effort is normal. No respiratory distress.      Breath sounds: Normal breath sounds.   Abdominal:      Palpations: Abdomen is soft.      Tenderness: There is no abdominal tenderness.   Musculoskeletal:         General: No swelling.      Cervical back: Normal range of motion and neck supple.   Skin:     General: Skin is warm and dry.      Capillary Refill: Capillary refill takes less than 2 seconds.   Neurological:      General: No focal deficit present.      Mental Status: She is alert and oriented to person, place, and time.   Psychiatric:         Mood and Affect: Mood normal. "          Nicole Davenport MD

## 2024-02-14 NOTE — PATIENT INSTRUCTIONS
Medicare Preventive Visit Patient Instructions  Thank you for completing your Welcome to Medicare Visit or Medicare Annual Wellness Visit today. Your next wellness visit will be due in one year (2/14/2025).  The screening/preventive services that you may require over the next 5-10 years are detailed below. Some tests may not apply to you based off risk factors and/or age. Screening tests ordered at today's visit but not completed yet may show as past due. Also, please note that scanned in results may not display below.  Preventive Screenings:  Service Recommendations Previous Testing/Comments   Colorectal Cancer Screening  * Colonoscopy    * Fecal Occult Blood Test (FOBT)/Fecal Immunochemical Test (FIT)  * Fecal DNA/Cologuard Test  * Flexible Sigmoidoscopy Age: 45-75 years old   Colonoscopy: every 10 years (may be performed more frequently if at higher risk)  OR  FOBT/FIT: every 1 year  OR  Cologuard: every 3 years  OR  Sigmoidoscopy: every 5 years  Screening may be recommended earlier than age 45 if at higher risk for colorectal cancer. Also, an individualized decision between you and your healthcare provider will decide whether screening between the ages of 76-85 would be appropriate. Colonoscopy: 03/29/2018  FOBT/FIT: Not on file  Cologuard: Not on file  Sigmoidoscopy: Not on file          Breast Cancer Screening Age: 40+ years old  Frequency: every 1-2 years  Not required if history of left and right mastectomy Mammogram: Not on file        Cervical Cancer Screening Between the ages of 21-29, pap smear recommended once every 3 years.   Between the ages of 30-65, can perform pap smear with HPV co-testing every 5 years.   Recommendations may differ for women with a history of total hysterectomy, cervical cancer, or abnormal pap smears in past. Pap Smear: Not on file        Hepatitis C Screening Once for adults born between 1945 and 1965  More frequently in patients at high risk for Hepatitis C Hep C Antibody:  02/19/2019        Diabetes Screening 1-2 times per year if you're at risk for diabetes or have pre-diabetes Fasting glucose: 97 mg/dL (2/7/2023)  A1C: No results in last 5 years (No results in last 5 years)      Cholesterol Screening Once every 5 years if you don't have a lipid disorder. May order more often based on risk factors. Lipid panel: 02/07/2023          Other Preventive Screenings Covered by Medicare:  Abdominal Aortic Aneurysm (AAA) Screening: covered once if your at risk. You're considered to be at risk if you have a family history of AAA.  Lung Cancer Screening: covers low dose CT scan once per year if you meet all of the following conditions: (1) Age 55-77; (2) No signs or symptoms of lung cancer; (3) Current smoker or have quit smoking within the last 15 years; (4) You have a tobacco smoking history of at least 20 pack years (packs per day multiplied by number of years you smoked); (5) You get a written order from a healthcare provider.  Glaucoma Screening: covered annually if you're considered high risk: (1) You have diabetes OR (2) Family history of glaucoma OR (3)  aged 50 and older OR (4)  American aged 65 and older  Osteoporosis Screening: covered every 2 years if you meet one of the following conditions: (1) You're estrogen deficient and at risk for osteoporosis based off medical history and other findings; (2) Have a vertebral abnormality; (3) On glucocorticoid therapy for more than 3 months; (4) Have primary hyperparathyroidism; (5) On osteoporosis medications and need to assess response to drug therapy.   Last bone density test (DXA Scan): Not on file.  HIV Screening: covered annually if you're between the age of 15-65. Also covered annually if you are younger than 15 and older than 65 with risk factors for HIV infection. For pregnant patients, it is covered up to 3 times per pregnancy.    Immunizations:  Immunization Recommendations   Influenza Vaccine Annual  influenza vaccination during flu season is recommended for all persons aged >= 6 months who do not have contraindications   Pneumococcal Vaccine   * Pneumococcal conjugate vaccine = PCV13 (Prevnar 13), PCV15 (Vaxneuvance), PCV20 (Prevnar 20)  * Pneumococcal polysaccharide vaccine = PPSV23 (Pneumovax) Adults 19-65 yo with certain risk factors or if 65+ yo  If never received any pneumonia vaccine: recommend Prevnar 20 (PCV20)  Give PCV20 if previously received 1 dose of PCV13 or PPSV23   Hepatitis B Vaccine 3 dose series if at intermediate or high risk (ex: diabetes, end stage renal disease, liver disease)   Respiratory syncytial virus (RSV) Vaccine - COVERED BY MEDICARE PART D  * RSVPreF3 (Arexvy) CDC recommends that adults 60 years of age and older may receive a single dose of RSV vaccine using shared clinical decision-making (SCDM)   Tetanus (Td) Vaccine - COST NOT COVERED BY MEDICARE PART B Following completion of primary series, a booster dose should be given every 10 years to maintain immunity against tetanus. Td may also be given as tetanus wound prophylaxis.   Tdap Vaccine - COST NOT COVERED BY MEDICARE PART B Recommended at least once for all adults. For pregnant patients, recommended with each pregnancy.   Shingles Vaccine (Shingrix) - COST NOT COVERED BY MEDICARE PART B  2 shot series recommended in those 19 years and older who have or will have weakened immune systems or those 50 years and older     Health Maintenance Due:      Topic Date Due   • Breast Cancer Screening: Mammogram  Never done   • Hepatitis C Screening  Completed   • Colorectal Cancer Screening  Discontinued     Immunizations Due:      Topic Date Due   • Influenza Vaccine (1) 09/01/2023   • COVID-19 Vaccine (4 - 2023-24 season) 09/01/2023     Advance Directives   What are advance directives?  Advance directives are legal documents that state your wishes and plans for medical care. These plans are made ahead of time in case you lose your  ability to make decisions for yourself. Advance directives can apply to any medical decision, such as the treatments you want, and if you want to donate organs.   What are the types of advance directives?  There are many types of advance directives, and each state has rules about how to use them. You may choose a combination of any of the following:  Living will:  This is a written record of the treatment you want. You can also choose which treatments you do not want, which to limit, and which to stop at a certain time. This includes surgery, medicine, IV fluid, and tube feedings.   Durable power of  for healthcare (DPAHC):  This is a written record that states who you want to make healthcare choices for you when you are unable to make them for yourself. This person, called a proxy, is usually a family member or a friend. You may choose more than 1 proxy.  Do not resuscitate (DNR) order:  A DNR order is used in case your heart stops beating or you stop breathing. It is a request not to have certain forms of treatment, such as CPR. A DNR order may be included in other types of advance directives.  Medical directive:  This covers the care that you want if you are in a coma, near death, or unable to make decisions for yourself. You can list the treatments you want for each condition. Treatment may include pain medicine, surgery, blood transfusions, dialysis, IV or tube feedings, and a ventilator (breathing machine).  Values history:  This document has questions about your views, beliefs, and how you feel and think about life. This information can help others choose the care that you would choose.  Why are advance directives important?  An advance directive helps you control your care. Although spoken wishes may be used, it is better to have your wishes written down. Spoken wishes can be misunderstood, or not followed. Treatments may be given even if you do not want them. An advance directive may make it easier  for your family to make difficult choices about your care.   Weight Management   Why it is important to manage your weight:  Being overweight increases your risk of health conditions such as heart disease, high blood pressure, type 2 diabetes, and certain types of cancer. It can also increase your risk for osteoarthritis, sleep apnea, and other respiratory problems. Aim for a slow, steady weight loss. Even a small amount of weight loss can lower your risk of health problems.  How to lose weight safely:  A safe and healthy way to lose weight is to eat fewer calories and get regular exercise. You can lose up about 1 pound a week by decreasing the number of calories you eat by 500 calories each day.   Healthy meal plan for weight management:  A healthy meal plan includes a variety of foods, contains fewer calories, and helps you stay healthy. A healthy meal plan includes the following:  Eat whole-grain foods more often.  A healthy meal plan should contain fiber. Fiber is the part of grains, fruits, and vegetables that is not broken down by your body. Whole-grain foods are healthy and provide extra fiber in your diet. Some examples of whole-grain foods are whole-wheat breads and pastas, oatmeal, brown rice, and bulgur.  Eat a variety of vegetables every day.  Include dark, leafy greens such as spinach, kale, symone greens, and mustard greens. Eat yellow and orange vegetables such as carrots, sweet potatoes, and winter squash.   Eat a variety of fruits every day.  Choose fresh or canned fruit (canned in its own juice or light syrup) instead of juice. Fruit juice has very little or no fiber.  Eat low-fat dairy foods.  Drink fat-free (skim) milk or 1% milk. Eat fat-free yogurt and low-fat cottage cheese. Try low-fat cheeses such as mozzarella and other reduced-fat cheeses.  Choose meat and other protein foods that are low in fat.  Choose beans or other legumes such as split peas or lentils. Choose fish, skinless poultry  (chicken or turkey), or lean cuts of red meat (beef or pork). Before you cook meat or poultry, cut off any visible fat.   Use less fat and oil.  Try baking foods instead of frying them. Add less fat, such as margarine, sour cream, regular salad dressing and mayonnaise to foods. Eat fewer high-fat foods. Some examples of high-fat foods include french fries, doughnuts, ice cream, and cakes.  Eat fewer sweets.  Limit foods and drinks that are high in sugar. This includes candy, cookies, regular soda, and sweetened drinks.  Exercise:  Exercise at least 30 minutes per day on most days of the week. Some examples of exercise include walking, biking, dancing, and swimming. You can also fit in more physical activity by taking the stairs instead of the elevator or parking farther away from stores. Ask your healthcare provider about the best exercise plan for you.      © Copyright [employer] 2018 Information is for End User's use only and may not be sold, redistributed or otherwise used for commercial purposes. All illustrations and images included in CareNotes® are the copyrighted property of [employer]., Inc[employer] or [employer]

## 2024-10-16 ENCOUNTER — HOSPITAL ENCOUNTER (OUTPATIENT)
Facility: HOSPITAL | Age: 79
Discharge: HOME | End: 2024-10-16
Payer: COMMERCIAL

## 2024-10-16 VITALS — HEIGHT: 68 IN | WEIGHT: 161.4 LBS | BODY MASS INDEX: 24.46 KG/M2

## 2024-10-16 DIAGNOSIS — Z13.820 OSTEOPOROSIS SCREENING: ICD-10-CM

## 2024-10-16 DIAGNOSIS — Z78.0 ASYMPTOMATIC MENOPAUSAL STATE: ICD-10-CM

## 2024-10-16 PROCEDURE — 77080 DXA BONE DENSITY AXIAL: CPT

## 2024-10-18 ENCOUNTER — TELEPHONE (OUTPATIENT)
Dept: FAMILY MEDICINE CLINIC | Facility: CLINIC | Age: 79
End: 2024-10-18

## 2024-10-18 NOTE — TELEPHONE ENCOUNTER
Patient made aware of Dexa results and notations.  Patient stated she will look into her options and call the office back.

## 2024-10-18 NOTE — TELEPHONE ENCOUNTER
----- Message from Nicole Davenport MD sent at 10/18/2024  7:59 AM EDT -----  Please let patient know that her dexa scan showed osteopenia ( low bone density) -her fracture risk of her hips are 4.3% - we recommend treating patient with hip fracture risk > 3% with medication with fosamax once a week pill or prolia injection every 6 months   Please let me know her thoughts  Continue vitamin D , calcium and weight bearing exercises    Dr davenport

## 2024-11-15 ENCOUNTER — TRANSCRIBE ORDERS (OUTPATIENT)
Dept: GASTROENTEROLOGY | Facility: CLINIC | Age: 79
End: 2024-11-15

## 2024-11-24 ENCOUNTER — HOSPITAL ENCOUNTER (INPATIENT)
Facility: HOSPITAL | Age: 79
LOS: 9 days | Discharge: HOME HEALTH | DRG: 522 | End: 2024-12-03
Attending: SURGERY | Admitting: SURGERY
Payer: COMMERCIAL

## 2024-11-24 ENCOUNTER — APPOINTMENT (INPATIENT)
Dept: RADIOLOGY | Facility: HOSPITAL | Age: 79
DRG: 522 | End: 2024-11-24
Payer: COMMERCIAL

## 2024-11-24 ENCOUNTER — APPOINTMENT (EMERGENCY)
Dept: RADIOLOGY | Facility: HOSPITAL | Age: 79
DRG: 522 | End: 2024-11-24
Payer: COMMERCIAL

## 2024-11-24 DIAGNOSIS — S42.402A CLOSED FRACTURE OF LEFT ELBOW, INITIAL ENCOUNTER: ICD-10-CM
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
## 2024-11-24 DIAGNOSIS — S72.002A LEFT DISPLACED FEMORAL NECK FRACTURE (HCC): Primary | ICD-10-CM

## 2024-11-24 LAB
ABO GROUP BLD: NORMAL
ALBUMIN SERPL BCG-MCNC: 4.3 G/DL (ref 3.5–5)
ALP SERPL-CCNC: 52 U/L (ref 34–104)
ALT SERPL W P-5'-P-CCNC: 18 U/L (ref 7–52)
ANION GAP SERPL CALCULATED.3IONS-SCNC: 14 MMOL/L (ref 4–13)
APTT PPP: 28 SECONDS (ref 23–34)
AST SERPL W P-5'-P-CCNC: 20 U/L (ref 13–39)
BASOPHILS # BLD AUTO: 0.05 THOUSANDS/ΜL (ref 0–0.1)
BASOPHILS NFR BLD AUTO: 0 % (ref 0–1)
BILIRUB SERPL-MCNC: 0.74 MG/DL (ref 0.2–1)
BLD GP AB SCN SERPL QL: NEGATIVE
BUN SERPL-MCNC: 12 MG/DL (ref 5–25)
CALCIUM SERPL-MCNC: 9.4 MG/DL (ref 8.4–10.2)
CHLORIDE SERPL-SCNC: 102 MMOL/L (ref 96–108)
CO2 SERPL-SCNC: 23 MMOL/L (ref 21–32)
CREAT SERPL-MCNC: 0.7 MG/DL (ref 0.6–1.3)
EOSINOPHIL # BLD AUTO: 0.03 THOUSAND/ΜL (ref 0–0.61)
EOSINOPHIL NFR BLD AUTO: 0 % (ref 0–6)
ERYTHROCYTE [DISTWIDTH] IN BLOOD BY AUTOMATED COUNT: 12.5 % (ref 11.6–15.1)
GFR SERPL CREATININE-BSD FRML MDRD: 83 ML/MIN/1.73SQ M
GLUCOSE SERPL-MCNC: 142 MG/DL (ref 65–140)
HCT VFR BLD AUTO: 45 % (ref 34.8–46.1)
HGB BLD-MCNC: 15 G/DL (ref 11.5–15.4)
IMM GRANULOCYTES # BLD AUTO: 0.06 THOUSAND/UL (ref 0–0.2)
IMM GRANULOCYTES NFR BLD AUTO: 0 % (ref 0–2)
INR PPP: 0.96 (ref 0.85–1.19)
LYMPHOCYTES # BLD AUTO: 1.61 THOUSANDS/ΜL (ref 0.6–4.47)
LYMPHOCYTES NFR BLD AUTO: 12 % (ref 14–44)
MCH RBC QN AUTO: 32.1 PG (ref 26.8–34.3)
MCHC RBC AUTO-ENTMCNC: 33.3 G/DL (ref 31.4–37.4)
MCV RBC AUTO: 96 FL (ref 82–98)
MONOCYTES # BLD AUTO: 0.8 THOUSAND/ΜL (ref 0.17–1.22)
MONOCYTES NFR BLD AUTO: 6 % (ref 4–12)
NEUTROPHILS # BLD AUTO: 11.21 THOUSANDS/ΜL (ref 1.85–7.62)
NEUTS SEG NFR BLD AUTO: 82 % (ref 43–75)
NRBC BLD AUTO-RTO: 0 /100 WBCS
PLATELET # BLD AUTO: 292 THOUSANDS/UL (ref 149–390)
PMV BLD AUTO: 9 FL (ref 8.9–12.7)
POTASSIUM SERPL-SCNC: 3.6 MMOL/L (ref 3.5–5.3)
PROT SERPL-MCNC: 7 G/DL (ref 6.4–8.4)
PROTHROMBIN TIME: 13.1 SECONDS (ref 12.3–15)
RBC # BLD AUTO: 4.68 MILLION/UL (ref 3.81–5.12)
RH BLD: POSITIVE
SODIUM SERPL-SCNC: 139 MMOL/L (ref 135–147)
SPECIMEN EXPIRATION DATE: NORMAL
WBC # BLD AUTO: 13.76 THOUSAND/UL (ref 4.31–10.16)

## 2024-11-24 PROCEDURE — 36415 COLL VENOUS BLD VENIPUNCTURE: CPT | Performed by: SURGERY

## 2024-11-24 PROCEDURE — 73560 X-RAY EXAM OF KNEE 1 OR 2: CPT

## 2024-11-24 PROCEDURE — 85730 THROMBOPLASTIN TIME PARTIAL: CPT | Performed by: SURGERY

## 2024-11-24 PROCEDURE — 93308 TTE F-UP OR LMTD: CPT | Performed by: SURGERY

## 2024-11-24 PROCEDURE — 85025 COMPLETE CBC W/AUTO DIFF WBC: CPT | Performed by: SURGERY

## 2024-11-24 PROCEDURE — 71045 X-RAY EXAM CHEST 1 VIEW: CPT

## 2024-11-24 PROCEDURE — 73110 X-RAY EXAM OF WRIST: CPT

## 2024-11-24 PROCEDURE — 80053 COMPREHEN METABOLIC PANEL: CPT | Performed by: SURGERY

## 2024-11-24 PROCEDURE — 73090 X-RAY EXAM OF FOREARM: CPT

## 2024-11-24 PROCEDURE — 99285 EMERGENCY DEPT VISIT HI MDM: CPT

## 2024-11-24 PROCEDURE — 76705 ECHO EXAM OF ABDOMEN: CPT | Performed by: SURGERY

## 2024-11-24 PROCEDURE — 86850 RBC ANTIBODY SCREEN: CPT | Performed by: SURGERY

## 2024-11-24 PROCEDURE — 73700 CT LOWER EXTREMITY W/O DYE: CPT

## 2024-11-24 PROCEDURE — 86900 BLOOD TYPING SEROLOGIC ABO: CPT | Performed by: SURGERY

## 2024-11-24 PROCEDURE — 73070 X-RAY EXAM OF ELBOW: CPT

## 2024-11-24 PROCEDURE — 85610 PROTHROMBIN TIME: CPT | Performed by: SURGERY

## 2024-11-24 PROCEDURE — 72170 X-RAY EXAM OF PELVIS: CPT

## 2024-11-24 PROCEDURE — 70450 CT HEAD/BRAIN W/O DYE: CPT

## 2024-11-24 PROCEDURE — 99223 1ST HOSP IP/OBS HIGH 75: CPT | Performed by: SURGERY

## 2024-11-24 PROCEDURE — 73080 X-RAY EXAM OF ELBOW: CPT

## 2024-11-24 PROCEDURE — 96374 THER/PROPH/DIAG INJ IV PUSH: CPT

## 2024-11-24 PROCEDURE — 86923 COMPATIBILITY TEST ELECTRIC: CPT

## 2024-11-24 PROCEDURE — 73552 X-RAY EXAM OF FEMUR 2/>: CPT

## 2024-11-24 PROCEDURE — 73502 X-RAY EXAM HIP UNI 2-3 VIEWS: CPT

## 2024-11-24 PROCEDURE — EDAIR PR ED AIR: Performed by: EMERGENCY MEDICINE

## 2024-11-24 PROCEDURE — 86901 BLOOD TYPING SEROLOGIC RH(D): CPT | Performed by: SURGERY

## 2024-11-24 PROCEDURE — 73200 CT UPPER EXTREMITY W/O DYE: CPT

## 2024-11-24 RX ORDER — CEFAZOLIN SODIUM 2 G/50ML
2000 SOLUTION INTRAVENOUS EVERY 8 HOURS
Status: COMPLETED | OUTPATIENT
Start: 2024-11-24 | End: 2024-11-25

## 2024-11-24 RX ORDER — POTASSIUM CHLORIDE 1500 MG/1
40 TABLET, EXTENDED RELEASE ORAL ONCE
Status: COMPLETED | OUTPATIENT
Start: 2024-11-24 | End: 2024-11-24

## 2024-11-24 RX ORDER — SODIUM CHLORIDE, SODIUM LACTATE, POTASSIUM CHLORIDE, CALCIUM CHLORIDE 600; 310; 30; 20 MG/100ML; MG/100ML; MG/100ML; MG/100ML
75 INJECTION, SOLUTION INTRAVENOUS CONTINUOUS
Status: DISCONTINUED | OUTPATIENT
Start: 2024-11-25 | End: 2024-11-25

## 2024-11-24 RX ORDER — ONDANSETRON 2 MG/ML
4 INJECTION INTRAMUSCULAR; INTRAVENOUS EVERY 6 HOURS PRN
Status: DISCONTINUED | OUTPATIENT
Start: 2024-11-24 | End: 2024-12-03 | Stop reason: HOSPADM

## 2024-11-24 RX ORDER — ACETAMINOPHEN 325 MG/1
650 TABLET ORAL EVERY 6 HOURS SCHEDULED
Status: DISCONTINUED | OUTPATIENT
Start: 2024-11-24 | End: 2024-12-03 | Stop reason: HOSPADM

## 2024-11-24 RX ORDER — FENTANYL CITRATE 50 UG/ML
50 INJECTION, SOLUTION INTRAMUSCULAR; INTRAVENOUS ONCE
Refills: 0 | Status: COMPLETED | OUTPATIENT
Start: 2024-11-24 | End: 2024-11-24

## 2024-11-24 RX ORDER — SODIUM CHLORIDE, SODIUM GLUCONATE, SODIUM ACETATE, POTASSIUM CHLORIDE, MAGNESIUM CHLORIDE, SODIUM PHOSPHATE, DIBASIC, AND POTASSIUM PHOSPHATE .53; .5; .37; .037; .03; .012; .00082 G/100ML; G/100ML; G/100ML; G/100ML; G/100ML; G/100ML; G/100ML
125 INJECTION, SOLUTION INTRAVENOUS CONTINUOUS
Status: DISCONTINUED | OUTPATIENT
Start: 2024-11-24 | End: 2024-11-26

## 2024-11-24 RX ORDER — ENOXAPARIN SODIUM 100 MG/ML
30 INJECTION SUBCUTANEOUS EVERY 12 HOURS
Status: DISCONTINUED | OUTPATIENT
Start: 2024-11-24 | End: 2024-12-03 | Stop reason: HOSPADM

## 2024-11-24 RX ORDER — CEFAZOLIN SODIUM 2 G/50ML
2000 SOLUTION INTRAVENOUS ONCE
Status: CANCELLED | OUTPATIENT
Start: 2024-11-24 | End: 2024-11-24

## 2024-11-24 RX ORDER — POLYETHYLENE GLYCOL 3350 17 G/17G
17 POWDER, FOR SOLUTION ORAL DAILY
Status: DISCONTINUED | OUTPATIENT
Start: 2024-11-25 | End: 2024-12-03 | Stop reason: HOSPADM

## 2024-11-24 RX ORDER — HYDROMORPHONE HCL/PF 1 MG/ML
0.2 SYRINGE (ML) INJECTION EVERY 4 HOURS PRN
Refills: 0 | Status: DISCONTINUED | OUTPATIENT
Start: 2024-11-24 | End: 2024-12-03 | Stop reason: HOSPADM

## 2024-11-24 RX ORDER — OXYCODONE HYDROCHLORIDE 5 MG/1
5 TABLET ORAL EVERY 4 HOURS PRN
Refills: 0 | Status: DISCONTINUED | OUTPATIENT
Start: 2024-11-24 | End: 2024-12-03 | Stop reason: HOSPADM

## 2024-11-24 RX ORDER — SENNOSIDES 8.6 MG
2 TABLET ORAL DAILY
Status: DISCONTINUED | OUTPATIENT
Start: 2024-11-25 | End: 2024-12-03 | Stop reason: HOSPADM

## 2024-11-24 RX ORDER — PANTOPRAZOLE SODIUM 40 MG/1
40 TABLET, DELAYED RELEASE ORAL
Status: DISCONTINUED | OUTPATIENT
Start: 2024-11-25 | End: 2024-12-03 | Stop reason: HOSPADM

## 2024-11-24 RX ADMIN — SODIUM CHLORIDE, SODIUM GLUCONATE, SODIUM ACETATE, POTASSIUM CHLORIDE, MAGNESIUM CHLORIDE, SODIUM PHOSPHATE, DIBASIC, AND POTASSIUM PHOSPHATE 125 ML/HR: .53; .5; .37; .037; .03; .012; .00082 INJECTION, SOLUTION INTRAVENOUS at 18:43

## 2024-11-24 RX ADMIN — ACETAMINOPHEN 650 MG: 325 TABLET, FILM COATED ORAL at 18:42

## 2024-11-24 RX ADMIN — Medication 2.5 MG: at 20:52

## 2024-11-24 RX ADMIN — POTASSIUM CHLORIDE 40 MEQ: 1500 TABLET, EXTENDED RELEASE ORAL at 18:42

## 2024-11-24 RX ADMIN — FENTANYL CITRATE 50 MCG: 50 INJECTION INTRAMUSCULAR; INTRAVENOUS at 16:58

## 2024-11-24 RX ADMIN — FENTANYL CITRATE 50 MCG: 50 INJECTION INTRAMUSCULAR; INTRAVENOUS at 16:46

## 2024-11-24 RX ADMIN — CEFAZOLIN SODIUM 2000 MG: 2 SOLUTION INTRAVENOUS at 20:52

## 2024-11-24 RX ADMIN — ENOXAPARIN SODIUM 30 MG: 30 INJECTION SUBCUTANEOUS at 20:53

## 2024-11-24 RX ADMIN — TRANEXAMIC ACID 850 MG: 100 INJECTION, SOLUTION INTRAVENOUS at 18:03
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
## 2024-11-24 NOTE — ASSESSMENT & PLAN NOTE
XR left elbow (AP, lateral) demonstrates left elbow fracture.     Plan:  -Appreciate orthopedics recommendations
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
## 2024-11-24 NOTE — ASSESSMENT & PLAN NOTE
XR left hip/pelvis demonstrates a closed left femoral neck fracture.    Plan:  NPO, last ate 2 hours prior to arrival  Appreciates orthopedics recommendations  TXA administered

## 2024-11-25 ENCOUNTER — APPOINTMENT (INPATIENT)
Dept: RADIOLOGY | Facility: HOSPITAL | Age: 79
DRG: 522 | End: 2024-11-25
Payer: COMMERCIAL

## 2024-11-25 ENCOUNTER — ANESTHESIA (INPATIENT)
Dept: PERIOP | Facility: HOSPITAL | Age: 79
DRG: 522 | End: 2024-11-25
Payer: COMMERCIAL

## 2024-11-25 ENCOUNTER — ANESTHESIA EVENT (INPATIENT)
Dept: PERIOP | Facility: HOSPITAL | Age: 79
DRG: 522 | End: 2024-11-25
Payer: COMMERCIAL

## 2024-11-25 PROBLEM — M85.89 OSTEOPENIA OF MULTIPLE SITES: Status: ACTIVE | Noted: 2024-11-25

## 2024-11-25 LAB
25(OH)D3 SERPL-MCNC: 29 NG/ML (ref 30–100)
ABO GROUP BLD: NORMAL
ANION GAP SERPL CALCULATED.3IONS-SCNC: 9 MMOL/L (ref 4–13)
ATRIAL RATE: 76 BPM
BUN SERPL-MCNC: 10 MG/DL (ref 5–25)
CALCIUM SERPL-MCNC: 8.7 MG/DL (ref 8.4–10.2)
CHLORIDE SERPL-SCNC: 105 MMOL/L (ref 96–108)
CO2 SERPL-SCNC: 24 MMOL/L (ref 21–32)
CREAT SERPL-MCNC: 0.67 MG/DL (ref 0.6–1.3)
ERYTHROCYTE [DISTWIDTH] IN BLOOD BY AUTOMATED COUNT: 12.6 % (ref 11.6–15.1)
GFR SERPL CREATININE-BSD FRML MDRD: 85 ML/MIN/1.73SQ M
GLUCOSE SERPL-MCNC: 105 MG/DL (ref 65–140)
HCT VFR BLD AUTO: 40.7 % (ref 34.8–46.1)
HGB BLD-MCNC: 13.1 G/DL (ref 11.5–15.4)
MAGNESIUM SERPL-MCNC: 2.1 MG/DL (ref 1.9–2.7)
MCH RBC QN AUTO: 31.5 PG (ref 26.8–34.3)
MCHC RBC AUTO-ENTMCNC: 32.2 G/DL (ref 31.4–37.4)
MCV RBC AUTO: 98 FL (ref 82–98)
P AXIS: 70 DEGREES
PHOSPHATE SERPL-MCNC: 3.1 MG/DL (ref 2.3–4.1)
PLATELET # BLD AUTO: 256 THOUSANDS/UL (ref 149–390)
PMV BLD AUTO: 9 FL (ref 8.9–12.7)
POTASSIUM SERPL-SCNC: 3.9 MMOL/L (ref 3.5–5.3)
PR INTERVAL: 212 MS
PTH-INTACT SERPL-MCNC: 36.8 PG/ML (ref 12–88)
QRS AXIS: 95 DEGREES
QRSD INTERVAL: 96 MS
QT INTERVAL: 396 MS
QTC INTERVAL: 445 MS
RBC # BLD AUTO: 4.16 MILLION/UL (ref 3.81–5.12)
RH BLD: POSITIVE
SODIUM SERPL-SCNC: 138 MMOL/L (ref 135–147)
T WAVE AXIS: 77 DEGREES
TSH SERPL DL<=0.05 MIU/L-ACNC: 2.02 UIU/ML (ref 0.45–4.5)
VENTRICULAR RATE: 76 BPM
WBC # BLD AUTO: 8.28 THOUSAND/UL (ref 4.31–10.16)

## 2024-11-25 PROCEDURE — C1776 JOINT DEVICE (IMPLANTABLE): HCPCS | Performed by: ORTHOPAEDIC SURGERY

## 2024-11-25 PROCEDURE — NC001 PR NO CHARGE: Performed by: ORTHOPAEDIC SURGERY

## 2024-11-25 PROCEDURE — 80048 BASIC METABOLIC PNL TOTAL CA: CPT

## 2024-11-25 PROCEDURE — 84443 ASSAY THYROID STIM HORMONE: CPT

## 2024-11-25 PROCEDURE — 0PSL04Z REPOSITION LEFT ULNA WITH INTERNAL FIXATION DEVICE, OPEN APPROACH: ICD-10-PCS | Performed by: ORTHOPAEDIC SURGERY

## 2024-11-25 PROCEDURE — 99223 1ST HOSP IP/OBS HIGH 75: CPT | Performed by: INTERNAL MEDICINE

## 2024-11-25 PROCEDURE — 24685 OPTX ULNAR FX PROX END W/FIX: CPT | Performed by: ORTHOPAEDIC SURGERY

## 2024-11-25 PROCEDURE — 93010 ELECTROCARDIOGRAM REPORT: CPT | Performed by: INTERNAL MEDICINE

## 2024-11-25 PROCEDURE — 99223 1ST HOSP IP/OBS HIGH 75: CPT | Performed by: ORTHOPAEDIC SURGERY

## 2024-11-25 PROCEDURE — 0SRB02Z REPLACEMENT OF LEFT HIP JOINT WITH METAL ON POLYETHYLENE SYNTHETIC SUBSTITUTE, OPEN APPROACH: ICD-10-PCS | Performed by: ORTHOPAEDIC SURGERY

## 2024-11-25 PROCEDURE — 36415 COLL VENOUS BLD VENIPUNCTURE: CPT

## 2024-11-25 PROCEDURE — 83735 ASSAY OF MAGNESIUM: CPT

## 2024-11-25 PROCEDURE — C1713 ANCHOR/SCREW BN/BN,TIS/BN: HCPCS | Performed by: ORTHOPAEDIC SURGERY

## 2024-11-25 PROCEDURE — 73070 X-RAY EXAM OF ELBOW: CPT

## 2024-11-25 PROCEDURE — 84100 ASSAY OF PHOSPHORUS: CPT

## 2024-11-25 PROCEDURE — 93005 ELECTROCARDIOGRAM TRACING: CPT

## 2024-11-25 PROCEDURE — 99232 SBSQ HOSP IP/OBS MODERATE 35: CPT | Performed by: SURGERY

## 2024-11-25 PROCEDURE — 83970 ASSAY OF PARATHORMONE: CPT

## 2024-11-25 PROCEDURE — 27130 TOTAL HIP ARTHROPLASTY: CPT | Performed by: ORTHOPAEDIC SURGERY

## 2024-11-25 PROCEDURE — 85027 COMPLETE CBC AUTOMATED: CPT

## 2024-11-25 PROCEDURE — 82306 VITAMIN D 25 HYDROXY: CPT

## 2024-11-25 DEVICE — PINNACLE POROCOAT ACETABULAR SHELL SECTOR II 50MM OD
Type: IMPLANTABLE DEVICE | Site: HIP | Status: FUNCTIONAL
Brand: PINNACLE POROCOAT

## 2024-11-25 DEVICE — 3.5MM LOCKING SCREW SLF-TPNG W/STARDRIVE(TM) RECESS 20MM: Type: IMPLANTABLE DEVICE | Site: ELBOW | Status: FUNCTIONAL

## 2024-11-25 DEVICE — ARTICUL/EZE FEMORAL HEAD DIAMETER 32MM +5 12/14 TAPER
Type: IMPLANTABLE DEVICE | Site: HIP | Status: FUNCTIONAL
Brand: ARTICUL/EZE

## 2024-11-25 DEVICE — PINNACLE CANCELLOUS BONE SCREW 6.5MM X 35MM
Type: IMPLANTABLE DEVICE | Site: HIP | Status: FUNCTIONAL
Brand: PINNACLE

## 2024-11-25 DEVICE — 3.5MM LOCKING SCREW SLF-TPNG W/STARDRIVE(TM) RECESS 16MM: Type: IMPLANTABLE DEVICE | Site: ELBOW | Status: FUNCTIONAL

## 2024-11-25 DEVICE — CORAIL HIP SYSTEM CEMENTLESS FEMORAL STEM 12/14 AMT 135 DEGREES KS SIZE 12 HA COATED STANDARD NO COLLAR
Type: IMPLANTABLE DEVICE | Site: HIP | Status: FUNCTIONAL
Brand: CORAIL

## 2024-11-25 DEVICE — 4.0MM CANCELLOUS BONE SCREW FULLY THREADED/60MM: Type: IMPLANTABLE DEVICE | Site: ELBOW | Status: FUNCTIONAL

## 2024-11-25 DEVICE — 3.5MM LCP OLECRANON PLATE 2 HOLES/RIGHT/86MM
Type: IMPLANTABLE DEVICE | Site: ELBOW | Status: FUNCTIONAL
Brand: LCP

## 2024-11-25 DEVICE — 3.5MM CORTEX SCREW SELF-TAPPING 20MM: Type: IMPLANTABLE DEVICE | Site: ELBOW | Status: FUNCTIONAL

## 2024-11-25 DEVICE — PINNACLE HIP SOLUTIONS ALTRX POLYETHYLENE ACETABULAR LINER +4 10 DEGREES 32MM ID 50MM OD
Type: IMPLANTABLE DEVICE | Site: HIP | Status: FUNCTIONAL
Brand: PINNACLE ALTRX

## 2024-11-25 RX ORDER — HYDROMORPHONE HCL/PF 1 MG/ML
SYRINGE (ML) INJECTION AS NEEDED
Status: DISCONTINUED | OUTPATIENT
Start: 2024-11-25 | End: 2024-11-25

## 2024-11-25 RX ORDER — FENTANYL CITRATE/PF 50 MCG/ML
25 SYRINGE (ML) INJECTION
Status: DISCONTINUED | OUTPATIENT
Start: 2024-11-25 | End: 2024-11-25 | Stop reason: HOSPADM

## 2024-11-25 RX ORDER — GABAPENTIN 100 MG/1
100 CAPSULE ORAL EVERY 8 HOURS
Status: DISCONTINUED | OUTPATIENT
Start: 2024-11-25 | End: 2024-12-03 | Stop reason: HOSPADM

## 2024-11-25 RX ORDER — CEFAZOLIN SODIUM 1 G/50ML
1000 SOLUTION INTRAVENOUS EVERY 8 HOURS
Status: COMPLETED | OUTPATIENT
Start: 2024-11-26 | End: 2024-11-26

## 2024-11-25 RX ORDER — BUPIVACAINE HYDROCHLORIDE 5 MG/ML
INJECTION, SOLUTION EPIDURAL; INTRACAUDAL
Status: COMPLETED | OUTPATIENT
Start: 2024-11-25 | End: 2024-11-25

## 2024-11-25 RX ORDER — CEFAZOLIN SODIUM 2 G/50ML
SOLUTION INTRAVENOUS AS NEEDED
Status: DISCONTINUED | OUTPATIENT
Start: 2024-11-25 | End: 2024-11-25

## 2024-11-25 RX ORDER — ONDANSETRON 2 MG/ML
4 INJECTION INTRAMUSCULAR; INTRAVENOUS ONCE
Status: COMPLETED | OUTPATIENT
Start: 2024-11-25 | End: 2024-11-25

## 2024-11-25 RX ORDER — PROMETHAZINE HYDROCHLORIDE 25 MG/ML
12.5 INJECTION, SOLUTION INTRAMUSCULAR; INTRAVENOUS ONCE AS NEEDED
Status: DISCONTINUED | OUTPATIENT
Start: 2024-11-25 | End: 2024-11-25 | Stop reason: HOSPADM

## 2024-11-25 RX ORDER — MIDAZOLAM HYDROCHLORIDE 2 MG/2ML
INJECTION, SOLUTION INTRAMUSCULAR; INTRAVENOUS AS NEEDED
Status: DISCONTINUED | OUTPATIENT
Start: 2024-11-25 | End: 2024-11-25

## 2024-11-25 RX ORDER — METHOCARBAMOL 500 MG/1
500 TABLET, FILM COATED ORAL EVERY 6 HOURS SCHEDULED
Status: DISCONTINUED | OUTPATIENT
Start: 2024-11-25 | End: 2024-12-03 | Stop reason: HOSPADM

## 2024-11-25 RX ORDER — TRANEXAMIC ACID 100 MG/ML
INJECTION, SOLUTION INTRAVENOUS AS NEEDED
Status: DISCONTINUED | OUTPATIENT
Start: 2024-11-25 | End: 2024-11-25

## 2024-11-25 RX ORDER — FENTANYL CITRATE 50 UG/ML
INJECTION, SOLUTION INTRAMUSCULAR; INTRAVENOUS AS NEEDED
Status: DISCONTINUED | OUTPATIENT
Start: 2024-11-25 | End: 2024-11-25

## 2024-11-25 RX ORDER — ONDANSETRON 2 MG/ML
INJECTION INTRAMUSCULAR; INTRAVENOUS AS NEEDED
Status: DISCONTINUED | OUTPATIENT
Start: 2024-11-25 | End: 2024-11-25

## 2024-11-25 RX ORDER — LIDOCAINE HYDROCHLORIDE 20 MG/ML
INJECTION, SOLUTION EPIDURAL; INFILTRATION; INTRACAUDAL; PERINEURAL AS NEEDED
Status: DISCONTINUED | OUTPATIENT
Start: 2024-11-25 | End: 2024-11-25

## 2024-11-25 RX ORDER — ONDANSETRON 2 MG/ML
4 INJECTION INTRAMUSCULAR; INTRAVENOUS ONCE AS NEEDED
Status: DISCONTINUED | OUTPATIENT
Start: 2024-11-25 | End: 2024-11-25 | Stop reason: HOSPADM

## 2024-11-25 RX ORDER — CALCIUM CARBONATE 500 MG/1
1000 TABLET, CHEWABLE ORAL DAILY PRN
Status: DISCONTINUED | OUTPATIENT
Start: 2024-11-25 | End: 2024-12-03 | Stop reason: HOSPADM

## 2024-11-25 RX ORDER — HYDROMORPHONE HCL/PF 1 MG/ML
0.5 SYRINGE (ML) INJECTION
Status: DISCONTINUED | OUTPATIENT
Start: 2024-11-25 | End: 2024-11-25 | Stop reason: HOSPADM

## 2024-11-25 RX ORDER — MEPERIDINE HYDROCHLORIDE 25 MG/ML
12.5 INJECTION INTRAMUSCULAR; INTRAVENOUS; SUBCUTANEOUS
Status: DISCONTINUED | OUTPATIENT
Start: 2024-11-25 | End: 2024-11-25 | Stop reason: HOSPADM

## 2024-11-25 RX ORDER — PROPOFOL 10 MG/ML
INJECTION, EMULSION INTRAVENOUS AS NEEDED
Status: DISCONTINUED | OUTPATIENT
Start: 2024-11-25 | End: 2024-11-25

## 2024-11-25 RX ORDER — ALBUTEROL SULFATE 0.83 MG/ML
2.5 SOLUTION RESPIRATORY (INHALATION) ONCE AS NEEDED
Status: DISCONTINUED | OUTPATIENT
Start: 2024-11-25 | End: 2024-11-25 | Stop reason: HOSPADM

## 2024-11-25 RX ORDER — ROCURONIUM BROMIDE 10 MG/ML
INJECTION, SOLUTION INTRAVENOUS AS NEEDED
Status: DISCONTINUED | OUTPATIENT
Start: 2024-11-25 | End: 2024-11-25

## 2024-11-25 RX ORDER — DOCUSATE SODIUM 100 MG/1
100 CAPSULE, LIQUID FILLED ORAL 2 TIMES DAILY
Status: DISCONTINUED | OUTPATIENT
Start: 2024-11-25 | End: 2024-12-03 | Stop reason: HOSPADM

## 2024-11-25 RX ORDER — LORAZEPAM 2 MG/ML
INJECTION INTRAMUSCULAR
Status: DISCONTINUED
Start: 2024-11-25 | End: 2024-11-25 | Stop reason: WASHOUT

## 2024-11-25 RX ADMIN — ROCURONIUM BROMIDE 20 MG: 10 INJECTION, SOLUTION INTRAVENOUS at 16:25

## 2024-11-25 RX ADMIN — ENOXAPARIN SODIUM 30 MG: 30 INJECTION SUBCUTANEOUS at 20:57

## 2024-11-25 RX ADMIN — ROCURONIUM BROMIDE 20 MG: 10 INJECTION, SOLUTION INTRAVENOUS at 15:15

## 2024-11-25 RX ADMIN — Medication 2.5 MG: at 11:02

## 2024-11-25 RX ADMIN — SODIUM CHLORIDE, SODIUM LACTATE, POTASSIUM CHLORIDE, AND CALCIUM CHLORIDE: .6; .31; .03; .02 INJECTION, SOLUTION INTRAVENOUS at 14:27

## 2024-11-25 RX ADMIN — SODIUM CHLORIDE, SODIUM LACTATE, POTASSIUM CHLORIDE, AND CALCIUM CHLORIDE: .6; .31; .03; .02 INJECTION, SOLUTION INTRAVENOUS at 15:36

## 2024-11-25 RX ADMIN — ENOXAPARIN SODIUM 30 MG: 30 INJECTION SUBCUTANEOUS at 08:06

## 2024-11-25 RX ADMIN — OXYCODONE HYDROCHLORIDE 5 MG: 5 TABLET ORAL at 00:19

## 2024-11-25 RX ADMIN — ACETAMINOPHEN 650 MG: 325 TABLET, FILM COATED ORAL at 11:03

## 2024-11-25 RX ADMIN — CEFAZOLIN SODIUM 1000 MG: 1 SOLUTION INTRAVENOUS at 23:54

## 2024-11-25 RX ADMIN — POLYETHYLENE GLYCOL 3350 17 G: 17 POWDER, FOR SOLUTION ORAL at 08:06

## 2024-11-25 RX ADMIN — PHENYLEPHRINE HYDROCHLORIDE 40 MCG/MIN: 10 INJECTION INTRAVENOUS at 14:54

## 2024-11-25 RX ADMIN — ONDANSETRON 4 MG: 2 INJECTION INTRAMUSCULAR; INTRAVENOUS at 18:54

## 2024-11-25 RX ADMIN — ONDANSETRON 4 MG: 2 INJECTION INTRAMUSCULAR; INTRAVENOUS at 17:04

## 2024-11-25 RX ADMIN — ACETAMINOPHEN 650 MG: 325 TABLET, FILM COATED ORAL at 23:55

## 2024-11-25 RX ADMIN — METHOCARBAMOL 500 MG: 500 TABLET ORAL at 23:54

## 2024-11-25 RX ADMIN — ACETAMINOPHEN 650 MG: 325 TABLET, FILM COATED ORAL at 05:18

## 2024-11-25 RX ADMIN — PROPOFOL 150 MG: 10 INJECTION, EMULSION INTRAVENOUS at 14:35

## 2024-11-25 RX ADMIN — OXYCODONE HYDROCHLORIDE 5 MG: 5 TABLET ORAL at 07:41

## 2024-11-25 RX ADMIN — MIDAZOLAM 2 MG: 1 INJECTION INTRAMUSCULAR; INTRAVENOUS at 14:29

## 2024-11-25 RX ADMIN — SUGAMMADEX 200 MG: 100 INJECTION, SOLUTION INTRAVENOUS at 17:04

## 2024-11-25 RX ADMIN — ROCURONIUM BROMIDE 50 MG: 10 INJECTION, SOLUTION INTRAVENOUS at 14:35

## 2024-11-25 RX ADMIN — LIDOCAINE HYDROCHLORIDE 100 MG: 20 INJECTION, SOLUTION EPIDURAL; INFILTRATION; INTRACAUDAL; PERINEURAL at 14:35

## 2024-11-25 RX ADMIN — SENNOSIDES 17.2 MG: 8.6 TABLET, FILM COATED ORAL at 08:06

## 2024-11-25 RX ADMIN — TRANEXAMIC ACID 1 G: 100 INJECTION, SOLUTION INTRAVENOUS at 14:45

## 2024-11-25 RX ADMIN — CEFAZOLIN SODIUM 2000 MG: 2 SOLUTION INTRAVENOUS at 05:19

## 2024-11-25 RX ADMIN — ACETAMINOPHEN 650 MG: 325 TABLET, FILM COATED ORAL at 00:19

## 2024-11-25 RX ADMIN — FENTANYL CITRATE 100 MCG: 50 INJECTION INTRAMUSCULAR; INTRAVENOUS at 14:35

## 2024-11-25 RX ADMIN — HYDROMORPHONE HYDROCHLORIDE 0.5 MG: 1 INJECTION, SOLUTION INTRAMUSCULAR; INTRAVENOUS; SUBCUTANEOUS at 15:12

## 2024-11-25 RX ADMIN — PANTOPRAZOLE SODIUM 40 MG: 40 TABLET, DELAYED RELEASE ORAL at 05:18

## 2024-11-25 RX ADMIN — BUPIVACAINE HYDROCHLORIDE 20 ML: 5 INJECTION, SOLUTION EPIDURAL; INTRACAUDAL; PERINEURAL at 14:10

## 2024-11-25 RX ADMIN — CEFAZOLIN SODIUM 2000 MG: 2 SOLUTION INTRAVENOUS at 14:41

## 2024-11-25 RX ADMIN — GABAPENTIN 100 MG: 100 CAPSULE ORAL at 23:54

## 2024-11-25 NOTE — ASSESSMENT & PLAN NOTE
Status post Left  Left ARTHROPLASTY HIP TOTAL, LEFT, OPEN REDUCTION W/ INTERNAL FIXATION (ORIF) ELBOW, LEFT DOS 11/25/24 stable postoperatively    WBAT LLE, posterior hip precautions, abduction pillow in bed  NWB LUE -- sling for comfort, anterior slab to stay on until POD 7  PT/OT -- okay to use platform walker for ambulation for LUE  Incentive spirometry  Pain control  DVT ppx: lovenox 28 days postoperatively  Diet: okay for diet per primary team  Monitor for acute blood loss anemia and administer or recommend IVF/prbc as indicated for greater than 2 gram Hgb drop or Hgb < 7

## 2024-11-25 NOTE — ANESTHESIA PROCEDURE NOTES
Peripheral Block    Patient location during procedure: holding area  Start time: 11/25/2024 2:10 PM  Reason for block: at surgeon's request and post-op pain management  Staffing  Performed by: Kaden Basurto MD  Authorized by: Emanuel Bose MD    Preanesthetic Checklist  Completed: patient identified, IV checked, site marked, risks and benefits discussed, surgical consent, monitors and equipment checked, pre-op evaluation and timeout performed  Peripheral Block  Patient position: sitting  Prep: ChloraPrep  Patient monitoring: frequent blood pressure checks, continuous pulse oximetry and heart rate  Block type: Supraclavicular  Laterality: left  Injection technique: single-shot  Procedures: ultrasound guided, Ultrasound guidance required for the procedure to increase accuracy and safety of medication placement and decrease risk of complications.  Ultrasound permanent image saved  bupivacaine (PF) (MARCAINE) 0.5 % injection 20 mL - Perineural   20 mL - 11/25/2024 2:10:00 PM  Needle  Needle type: Stimuplex   Needle gauge: 20 G  Needle length: 4 in  Needle localization: anatomical landmarks and ultrasound guidance  Assessment  Injection assessment: incremental injection, frequent aspiration, injected with ease, negative aspiration, negative for heart rate change, no paresthesia on injection, no symptoms of intraneural/intravenous injection and needle tip visualized at all times  Paresthesia pain: none  Post-procedure:  site cleaned  patient tolerated the procedure well with no immediate complications  Additional Notes  Left supraclavicular block with 20cc Bupivacaine 0.5%: ultrasound guidance - upper, middle, and lower trunks identified, surrounding area infiltrated with local successfully, no acute complications noted.

## 2024-11-25 NOTE — ANESTHESIA POSTPROCEDURE EVALUATION
Post-Op Assessment Note    CV Status:  Stable    Pain management: adequate       Mental Status:  Alert and awake   Hydration Status:  Euvolemic   PONV Controlled:  Controlled   Airway Patency:  Patent     Post Op Vitals Reviewed: Yes    No anethesia notable event occurred.    Staff: CRNA           Last Filed PACU Vitals:  Vitals Value Taken Time   Temp 97.6 °F (36.4 °C) 11/25/24 1735   Pulse 102 11/25/24 1737   /93 11/25/24 1736   Resp 12 11/25/24 1737   SpO2 97 % 11/25/24 1737   Vitals shown include unfiled device data.    Modified Stephanie:  No data recorded

## 2024-11-25 NOTE — OP NOTE
OPERATIVE REPORT  PATIENT NAME: Analia Evans  : 1945  MRN: [id number]  Pt Location:  [facility] OR ROOM 09    Surgery Date: 2024    Surgeons and Role:     * Akash Contreras MD - Primary     * Stuart Gutiérrez MD     Preop Diagnosis:  Left displaced femoral neck fracture (HCC) [S72.002A]  Closed fracture of left elbow, initial encounter [S42.402A]    Post-Op Diagnosis Codes:     * Left displaced femoral neck fracture (HCC) [S72.002A]     * Closed fracture of left elbow, initial encounter [S42.402A]    Procedure(s):  Left - ARTHROPLASTY HIP TOTAL. LEFT  Left - OPEN REDUCTION W/ INTERNAL FIXATION (ORIF) ELBOW. LEFT    Specimens:  * No specimens in log *    Estimated Blood Loss:   150 mL    Drains:  Urethral Catheter Latex 16 Fr. (Active)   Number of days: 0       External Urinary Catheter (Active)   Collection Container Canister and suction tubing (For Female) 243   Securement Method for Male Tape 24 0549   Interventions Removed and skin assessed;Pericare performed;Device changed 24 0549   Number of days: 1       Anesthesia Type:   Choice     Operative Indications:  Left displaced femoral neck fracture (HCC) [S72.002A]  Closed fracture of left elbow, initial encounter [S42.402A]    Operative Findings:  Depuy NAVEED   Cup-50mm metal   Liner-10 degree lipped poly   Head/neck-32 +5mm metal   Femur-12   Wire X 1  Elbow-synthes olecranon plate    Complications:   None      Hip Approach: Posterior    Chronic Narcotic Use:  No      Procedure and Technique:  Following the induction medical level of general anesthesia, a Lutz catheter sterilely due to the patient's bladder.  Antibiotics administered.  She is then placed in the right lateral cubitus, left side up position.  An axillary roll was placed underneath the right axilla.  The left hip and lateral thigh were then prepped made sterilely.  A posterolateral approach was created in order to gain access to the hip.  Full-thickness flaps were  raised to get to the tensor fascia.  This was split, expose the deep layer of the hip.  With the hip in internal rotation, the piriformis tendon was identified, transected, and retracted in a posterior fashion.  The remainder the short external rotators were sectioned as well.  A T-type capsulotomy was used to open up the hip.  The proximal femur was then delivered in the wound.  A freshen up cut was performed on the base of the femoral neck.  In accordance with department guidelines, a wire was placed around the proximal femur protected.  Due to the short neck left, this wire was placed distal to the lesser trochanter.  A posterior capsulectomy, anterior capsulotomy were then created in order to circumference expose the acetabulum.  Reaming started at 50 and with 1 pass a bit of bleeding cancellous bone was encountered.  The 50 trial was inserted and noted fit well, therefore the 50 mm insert was then hammered into place.  A single screw was then placed within the posterior superior quadrant for additional fixation.  The 10 degree lipped liner was then snapped into position.  The proximal femur was then prepared for insertion of press-fit component.  The box osteotome was used with proximal femur.  The patient's canal sequentially broached.  With a #12 and a 32+5 femoral head and neck, it was located, taken through range of motion cuff adequate stable.  The trial components removed if the hip was carefully dislocated.  The insert components were assembled and introduced to the patient's left hip in standard fashion.  Hip was once more located, taken through a range of motion, found a quite stable.  Satisfied with the extent of surgery, the wounds were flushed with saline and closed.  A Betadine soak initiated.  The piriformis tendon was reapproximated to the greater trochanter number Vicryl suture.  The tensor fascia was then closed with number Vicryl suture.  The subcu tissues were closed with a mixture #1 for  deep layer, 2-0 Vicryl for the subcutaneous tissues, and skin staples for the skin.  Sterile dressings were applied.  An abduction pillow placed.  She was then taken out of the lateral decubitus position placed supine on the OR table.  The left upper extremity then underwent a separate sterile prep and drape.  A posterior based incision was created to gain access to the extensor mechanism.  The displaced olecranon fracture was identified.  And underwent direct open reduction, and well reduced a locking proximal ulna plate was applied in standard fashion.  Final fluoroscopic films document a well aligned fracture, and good hardware position.  Satisfied with the extent of surgery, the wounds then flushed with saline and closed.  Medial and lateral retinacular tears were closed with number Vicryl suture.  The subcu tissues were closed with 2-0 Vicryl suture.  The skin was then closed with 2-0 nylon suture in mattress fashion.  Sterile dressing applied.  An anterior fiberglass Michael by the elbow in mid flexion.  The splint was lacunar.  She was then awakened from general anesthesia, and taken recovery in stable condition with plans to include physical therapy for weightbearing to tolerance in the left lower extremity.  She can use a platform walker and bear weight across her left forearm to help assist in her recovery.  She will require DVT prophylaxis as well   I was present for the entire procedure.    Patient Disposition:  PACU               SIGNATURE: Akash Contreras MD  DATE: November 25, 2024  TIME: 5:25 PM

## 2024-11-25 NOTE — PROGRESS NOTES
Pastoral Care Progress Note          Chaplaincy Interventions Utilized:   Empowerment: Clarified, confirmed, or reviewed information from treatment team     Exploration: Explored emotional needs & resources and Explored spiritual needs & resources    Collaboration: Encouraged adherence to treatment plan     Relationship Building: Cultivated a relationship of care and support, Listened empathically, and Hospitality    Ritual:        11/25/24 1400   Clinical Encounter Type   Visited With Patient   Routine Visit Follow-up             Chaplaincy Outcomes Achieved:  Expressed gratitude     spent time chatting with the patient as she awaited surgery.  Patient is very optimistic and upbeat.        Spiritual Coping Strategies Utilized:        remains available.

## 2024-11-25 NOTE — ASSESSMENT & PLAN NOTE
XR left hip/pelvis demonstrates a closed left femoral neck fracture.    Plan:  OR today with orthopedic surgery for total left hip arthoplasty, ORIF left elbow  NPO prior to OR  Appreciates orthopedics recommendations  NWB YOSEPH and JUAREZ  TXA administered  F/U endocrinology consult for fragility fx L femoral neck  PT/OT  Case management
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
## 2024-11-25 NOTE — DISCHARGE INSTR - AVS FIRST PAGE
Discharge Instructions - Orthopedics  Analia Evans 79 y.o. female MRN: [id number]  Unit/Bed#: Operating Room    Weight Bearing Status:                                           Weight Bearing as tolerated to left lower extremity with assistive devices.   Non weight bearing to left upper extremity, in sling and splint at all times until follow up appointment - you may apply pressure to the arm in order to ambulate with a walker    Be sure to maintain posterior hip precautions: no bending at waist, no crossing legs, on internally rotating surgical leg    Pain Management/Medications  You may resume your usual medications.  Please take the following medications:  Anti-coagulation (blood clot prevention) - Complete Lovenox injections for 28 days.   Pain medication:  Oxycodone 5 m tablet every 6 hours as needed for severe pain  Robaxin (Muscle relaxer) 500 m tablet up to 3 times a day as needed for mild pain and muscle discomfort  Tylenol 1000 mg: up to three times daily as needed for mild to moderate pain. Do not exceed 3000mg daily   Continue applying ice on and off for about 20 minutes as needed.  Continue to elevate your operative leg, with ankle above the level of your heart when possible.  Continue to elevate your operative arm, above the level of your heart when possible.   If you have questions or pain concerns, please contact the office.   If you need refills of your medications prior to your next office visit, please contact the office.      Showering/Dressing Instructions:   Do not shower until first follow up appointment.  Leave bandage covering surgical incision on until seen in office.  Remain in splint and sling to left arm at all times until follow-up appointment.    No baths, swimming, or submerging your leg until cleared to do so.      Driving Instructions:  No driving until cleared by Orthopaedic Surgery.    PT/OT:  Continue PT/OT on outpatient basis as directed    Appt Instructions:   Please  call the clinic at [phone] to schedule a follow up appointment for about 10 days from discharge.     Please contact the office if you experience any of the following:  Excessive bleeding (bleeding through your dressing)  Fever greater than 101 degrees F after 48 hours (low grade fevers the day or two after surgery are normal)  Persistent nausea or vomiting  Decreased sensation or discoloration of the operative limb  Pain or swelling that is getting worse and not better with medication    Miscellaneous:  Advise use of abduction pillow (pink pillow) for 6 weeks after surgery when sleeping.    Advise against any dental cleanings or procedures for 3 months after surgery.   - If there is a dental emergency, please contact the office for further instructions.

## 2024-11-25 NOTE — PROGRESS NOTES
Progress Note - Orthopedics   Name: Analia Evans 79 y.o. female I MRN: [id number]  Unit/Bed#: OR POOL I Date of Admission: 11/24/2024   Date of Service: 11/25/2024 I Hospital Day: 1    Assessment & Plan  Left displaced femoral neck fracture (HCC)  Status post Left  Left ARTHROPLASTY HIP TOTAL, LEFT, OPEN REDUCTION W/ INTERNAL FIXATION (ORIF) ELBOW, LEFT DOS 11/25/24 stable postoperatively    WBAT LLE, posterior hip precautions, abduction pillow in bed  NWB LUE -- sling for comfort, anterior slab to stay on until POD 7  PT/OT -- okay to use platform walker for ambulation for LUE  Incentive spirometry  Pain control  DVT ppx: lovenox 28 days postoperatively  Diet: okay for diet per primary team  Monitor for acute blood loss anemia and administer or recommend IVF/prbc as indicated for greater than 2 gram Hgb drop or Hgb < 7    Closed fracture of left elbow  See above  Osteopenia of multiple sites      Please contact the SecureChat role for the Orthopedics service with any questions/concerns.    Subjective   No acute events, no acute distress. Denies fever/chills, SOB. Pain well controlled.     Objective      Temp:  [97.6 °F (36.4 °C)] 97.6 °F (36.4 °C)  HR:  [] 97  BP: (110-185)/(60-93) 173/91  Resp:  [12-20] 16  SpO2:  [95 %-98 %] 97 %  O2 Device: Nasal cannula  Nasal Cannula O2 Flow Rate (L/min):  [2 L/min] 2 L/min  O2 Device: Nasal cannula          I/O         11/23 0701  11/24 0700 11/24 0701  11/25 0700 11/25 0701  11/26 0700    I.V. (mL/kg)   1700 (20.1)    IV Piggyback  100 50    Total Intake(mL/kg)  100 (1.2) 1750 (20.7)    Urine (mL/kg/hr)   200 (0.2)    Blood   150    Total Output   350    Net  +100 +1400                 Lines/Drains/Airways       Active Status       Name Placement date Placement time Site Days    Urethral Catheter Latex 16 Fr. 11/25/24  1445  Latex  less than 1    External Urinary Catheter 11/24/24  1913  -- less than 1                  Physical Exam   Musculoskeletal: Left Upper  "Extremity  Dressing C/D/I  - anterior slab and sling  No focal TTP  Diminished sensation due to nerve block  Weakness with motor of hand due to nerve block  2+ rad pulse      Musculoskeletal: Left Lower Extremity  Dressing C/D/I  TTP savi-incisionally  Sensation intact to light touch adelina/saph/sp/dp/tib  Motor intact EHL/FHL, ankle DF/PF  2+ DP pulse  No calf swelling or ttp        Lab Results: I have reviewed the following results:  Recent Labs     11/24/24  1648 11/25/24  0502   WBC 13.76* 8.28   HGB 15.0 13.1   HCT 45.0 40.7    256   BUN 12 10   CREATININE 0.70 0.67   PTT 28  --    INR 0.96  --      Blood Culture:  No results found for: \"BLOODCX\"  Wound Culture: No results found for: \"WOUNDCULT\"      "
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
## 2024-11-25 NOTE — PROGRESS NOTES
Progress Note - Orthopedics   Name: Analia Evans 79 y.o. female I MRN: [id number]  Unit/Bed#: [facility] 630-01 I Date of Admission: 11/24/2024   Date of Service: 11/26/2024 I Hospital Day: 2    Assessment & Plan  Left displaced femoral neck fracture (HCC)  Status post Left  Left ARTHROPLASTY HIP TOTAL, LEFT, OPEN REDUCTION W/ INTERNAL FIXATION (ORIF) ELBOW, LEFT DOS 11/25/24 stable postoperatively    WBAT LLE, posterior hip precautions, abduction pillow in bed  NWB LUE -- sling for comfort, anterior slab to stay on until POD 7  PT/OT -- okay to use platform walker for ambulation for LUE  Incentive spirometry  Pain control  DVT ppx: lovenox 28 days postoperatively  Diet: okay for diet per primary team  Monitor for acute blood loss anemia and administer or recommend IVF/prbc as indicated for greater than 2 gram Hgb drop or Hgb < 7    Closed fracture of left elbow  See above  Osteopenia of multiple sites      Please contact the SecureChat role for the Orthopedics service with any questions/concerns.    Subjective   No acute events, no acute distress. Denies fever/chills, SOB. Pain well controlled.     Objective      Temp:  [97.6 °F (36.4 °C)-100.6 °F (38.1 °C)] 100.6 °F (38.1 °C)  HR:  [] 85  BP: (105-185)/(64-96) 111/65  Resp:  [15-19] 16  SpO2:  [96 %-98 %] 96 %  O2 Device: None (Room air)  Nasal Cannula O2 Flow Rate (L/min):  [2 L/min] 2 L/min  O2 Device: None (Room air)          I/O         11/24 0701  11/25 0700 11/25 0701  11/26 0700    I.V. (mL/kg)  1700 (20.1)    IV Piggyback 100 50    Total Intake(mL/kg) 100 (1.2) 1750 (20.7)    Urine (mL/kg/hr)  700 (0.3)    Blood  150    Total Output  850    Net +100 +900                    Physical Exam   Musculoskeletal: LLE and Left Upper Extremity  Dressing C/D/I - anterior slab and sling  No focal TTP  Diminished sensation due to nerve block  Weakness with motor due to nerve block, able to move all digits but detailed sensorimotor exam limited  2+ rad pulse  Dressing  "c/d/I LLE mepilex  Ttp savi incisionally LLE  Motor intact EHL/FHL, PF/DF  SILT sp/dp/s/s/t  2+ dp pulse  No calf ttp or erythema      Lab Results: I have reviewed the following results:  Recent Labs     11/24/24  1648 11/25/24  0502   WBC 13.76* 8.28   HGB 15.0 13.1   HCT 45.0 40.7    256   BUN 12 10   CREATININE 0.70 0.67   PTT 28  --    INR 0.96  --      Blood Culture:  No results found for: \"BLOODCX\"  Wound Culture: No results found for: \"WOUNDCULT\"      "

## 2024-11-25 NOTE — ANESTHESIA PREPROCEDURE EVALUATION
Procedure:  ARTHROPLASTY HIP TOTAL, LEFT (Left: Hip)  OPEN REDUCTION W/ INTERNAL FIXATION (ORIF) ELBOW, LEFT (Left: Elbow)    Relevant Problems   No relevant active problems        Physical Exam    Airway    Mallampati score: I  TM Distance: >3 FB  Neck ROM: full     Dental       Cardiovascular  Cardiovascular exam normal    Pulmonary  Pulmonary exam normal     Other Findings  post-pubertal.      Anesthesia Plan  ASA Score- 1     Anesthesia Type- general with ASA Monitors.         Additional Monitors:     Airway Plan: ETT.           Plan Factors-Exercise tolerance (METS): >4 METS.    Chart reviewed. EKG reviewed. Imaging results reviewed. Existing labs reviewed. Patient summary reviewed.    Patient is not a current smoker.  Patient did not smoke on day of surgery.    Obstructive sleep apnea risk education given perioperatively.        Induction- intravenous.    Postoperative Plan- Plan for postoperative opioid use. Planned trial extubation    Perioperative Resuscitation Plan - Level 1 - Full Code.       Informed Consent- Anesthetic plan and risks discussed with patient.  I personally reviewed this patient with the CRNA. Discussed and agreed on the Anesthesia Plan with the CRNA..
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
## 2024-11-25 NOTE — ANESTHESIA POSTPROCEDURE EVALUATION
Post-Op Assessment Note            No anethesia notable event occurred.            Last Filed PACU Vitals:  Vitals Value Taken Time   Temp 97.6 °F (36.4 °C) 11/25/24 1812   Pulse 79 11/25/24 1812   /96 11/25/24 1812   Resp 15 11/25/24 1812   SpO2 97 % 11/25/24 1812       Modified Stephanie:  Activity: 2 (11/25/2024  6:12 PM)  Respiration: 2 (11/25/2024  6:12 PM)  Circulation: 2 (11/25/2024  6:12 PM)  Consciousness: 2 (11/25/2024  6:12 PM)  Oxygen Saturation: 2 (11/25/2024  6:12 PM)  Modified Stephanie Score: 10 (11/25/2024  6:12 PM)
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
## 2024-11-26 LAB
ABO GROUP BLD BPU: NORMAL
ABO GROUP BLD BPU: NORMAL
ANION GAP SERPL CALCULATED.3IONS-SCNC: 7 MMOL/L (ref 4–13)
BASOPHILS # BLD AUTO: 0.02 THOUSANDS/ΜL (ref 0–0.1)
BASOPHILS NFR BLD AUTO: 0 % (ref 0–1)
BPU ID: NORMAL
BPU ID: NORMAL
BUN SERPL-MCNC: 11 MG/DL (ref 5–25)
CALCIUM SERPL-MCNC: 8.3 MG/DL (ref 8.4–10.2)
CHLORIDE SERPL-SCNC: 103 MMOL/L (ref 96–108)
CO2 SERPL-SCNC: 25 MMOL/L (ref 21–32)
CREAT SERPL-MCNC: 0.62 MG/DL (ref 0.6–1.3)
CROSSMATCH: NORMAL
CROSSMATCH: NORMAL
EOSINOPHIL # BLD AUTO: 0 THOUSAND/ΜL (ref 0–0.61)
EOSINOPHIL NFR BLD AUTO: 0 % (ref 0–6)
ERYTHROCYTE [DISTWIDTH] IN BLOOD BY AUTOMATED COUNT: 12.5 % (ref 11.6–15.1)
GFR SERPL CREATININE-BSD FRML MDRD: 87 ML/MIN/1.73SQ M
GLUCOSE SERPL-MCNC: 119 MG/DL (ref 65–140)
HCT VFR BLD AUTO: 34.4 % (ref 34.8–46.1)
HGB BLD-MCNC: 11.3 G/DL (ref 11.5–15.4)
IMM GRANULOCYTES # BLD AUTO: 0.04 THOUSAND/UL (ref 0–0.2)
IMM GRANULOCYTES NFR BLD AUTO: 0 % (ref 0–2)
LYMPHOCYTES # BLD AUTO: 1.38 THOUSANDS/ΜL (ref 0.6–4.47)
LYMPHOCYTES NFR BLD AUTO: 14 % (ref 14–44)
MCH RBC QN AUTO: 32.1 PG (ref 26.8–34.3)
MCHC RBC AUTO-ENTMCNC: 32.8 G/DL (ref 31.4–37.4)
MCV RBC AUTO: 98 FL (ref 82–98)
MONOCYTES # BLD AUTO: 0.89 THOUSAND/ΜL (ref 0.17–1.22)
MONOCYTES NFR BLD AUTO: 9 % (ref 4–12)
NEUTROPHILS # BLD AUTO: 7.86 THOUSANDS/ΜL (ref 1.85–7.62)
NEUTS SEG NFR BLD AUTO: 77 % (ref 43–75)
NRBC BLD AUTO-RTO: 0 /100 WBCS
PLATELET # BLD AUTO: 271 THOUSANDS/UL (ref 149–390)
PMV BLD AUTO: 9.5 FL (ref 8.9–12.7)
POTASSIUM SERPL-SCNC: 4.5 MMOL/L (ref 3.5–5.3)
RBC # BLD AUTO: 3.52 MILLION/UL (ref 3.81–5.12)
SODIUM SERPL-SCNC: 135 MMOL/L (ref 135–147)
UNIT DISPENSE STATUS: NORMAL
UNIT DISPENSE STATUS: NORMAL
UNIT PRODUCT CODE: NORMAL
UNIT PRODUCT CODE: NORMAL
UNIT PRODUCT VOLUME: 350 ML
UNIT PRODUCT VOLUME: 350 ML
UNIT RH: NORMAL
UNIT RH: NORMAL
WBC # BLD AUTO: 10.19 THOUSAND/UL (ref 4.31–10.16)

## 2024-11-26 PROCEDURE — 99232 SBSQ HOSP IP/OBS MODERATE 35: CPT | Performed by: SURGERY

## 2024-11-26 PROCEDURE — NC001 PR NO CHARGE: Performed by: ORTHOPAEDIC SURGERY

## 2024-11-26 PROCEDURE — 97163 PT EVAL HIGH COMPLEX 45 MIN: CPT

## 2024-11-26 PROCEDURE — 85025 COMPLETE CBC W/AUTO DIFF WBC: CPT

## 2024-11-26 PROCEDURE — 99223 1ST HOSP IP/OBS HIGH 75: CPT | Performed by: ANESTHESIOLOGY

## 2024-11-26 PROCEDURE — 97167 OT EVAL HIGH COMPLEX 60 MIN: CPT

## 2024-11-26 PROCEDURE — 80048 BASIC METABOLIC PNL TOTAL CA: CPT

## 2024-11-26 RX ADMIN — DOCUSATE SODIUM 100 MG: 100 CAPSULE, LIQUID FILLED ORAL at 17:19

## 2024-11-26 RX ADMIN — METHOCARBAMOL 500 MG: 500 TABLET ORAL at 05:26

## 2024-11-26 RX ADMIN — METHOCARBAMOL 500 MG: 500 TABLET ORAL at 12:03

## 2024-11-26 RX ADMIN — METHOCARBAMOL 500 MG: 500 TABLET ORAL at 17:19

## 2024-11-26 RX ADMIN — POLYETHYLENE GLYCOL 3350 17 G: 17 POWDER, FOR SOLUTION ORAL at 08:58

## 2024-11-26 RX ADMIN — ENOXAPARIN SODIUM 30 MG: 30 INJECTION SUBCUTANEOUS at 08:57

## 2024-11-26 RX ADMIN — PANTOPRAZOLE SODIUM 40 MG: 40 TABLET, DELAYED RELEASE ORAL at 05:26

## 2024-11-26 RX ADMIN — GABAPENTIN 100 MG: 100 CAPSULE ORAL at 08:57

## 2024-11-26 RX ADMIN — ACETAMINOPHEN 650 MG: 325 TABLET, FILM COATED ORAL at 17:19

## 2024-11-26 RX ADMIN — GABAPENTIN 100 MG: 100 CAPSULE ORAL at 17:19

## 2024-11-26 RX ADMIN — OXYCODONE HYDROCHLORIDE 5 MG: 5 TABLET ORAL at 20:45

## 2024-11-26 RX ADMIN — ENOXAPARIN SODIUM 30 MG: 30 INJECTION SUBCUTANEOUS at 20:45

## 2024-11-26 RX ADMIN — SENNOSIDES 17.2 MG: 8.6 TABLET, FILM COATED ORAL at 08:57

## 2024-11-26 RX ADMIN — CEFAZOLIN SODIUM 1000 MG: 1 SOLUTION INTRAVENOUS at 08:57

## 2024-11-26 RX ADMIN — OXYCODONE HYDROCHLORIDE 5 MG: 5 TABLET ORAL at 08:57

## 2024-11-26 RX ADMIN — ACETAMINOPHEN 650 MG: 325 TABLET, FILM COATED ORAL at 12:03

## 2024-11-26 RX ADMIN — DOCUSATE SODIUM 100 MG: 100 CAPSULE, LIQUID FILLED ORAL at 08:57

## 2024-11-26 RX ADMIN — ACETAMINOPHEN 650 MG: 325 TABLET, FILM COATED ORAL at 05:25

## 2024-11-26 NOTE — ASSESSMENT & PLAN NOTE
Status post Left  Left ARTHROPLASTY HIP TOTAL, LEFT, OPEN REDUCTION W/ INTERNAL FIXATION (ORIF) ELBOW, LEFT DOS 11/25/24 stable postoperatively    WBAT LLE, posterior hip precautions, abduction pillow in bed  NWB LUE -- sling for comfort, anterior slab to stay on until POD 7 -- okay to remove and begin range of motion at that point  PT/OT -- okay to use platform walker for ambulation for LUE  Incentive spirometry  Pain control  DVT ppx: lovenox 28 days postoperatively  Diet: okay for diet per primary team  Monitor for acute blood loss anemia and administer or recommend IVF/prbc as indicated for greater than 2 gram Hgb drop or Hgb < 7

## 2024-11-26 NOTE — ASSESSMENT & PLAN NOTE
XR left elbow (AP, lateral) demonstrates left elbow fracture.     Plan:  - Appreciate orthopedics recommendations  - NWB LUE  -Sling for comfort, anterior slab to stay on until postop day 7  - okay to use platform walker for ambulation for LUE
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
## 2024-11-26 NOTE — ASSESSMENT & PLAN NOTE
S/p ORIF of Lt. Elbow with pre-op supraclavicular block on 11/25/24     Continue multimodal analgesia   - PO tylenol 650mg Q6H  - Gabapentin 100mg Q8H   - PO robaxin 500mg Q6H   - IV dilaudid 0.2mg Q4H prn for breakthrough pain   - Oxycodone 2.5/5mg Q4H prn for mod/severe pain
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
## 2024-11-26 NOTE — QUICK NOTE
"Post Op Check Note - Surgery Resident  Analia Evans 79 y.o. female MRN: [id number]  Unit/Bed#: [facility] 630-01 Encounter: [id number]    ASSESSMENT:  Analia Evans is a 79 y.o. female who is status post left hip arthroplasty, left elbow ORIF.    Subjective: throat feels dry otherwise feels well overall. Pain well controlled.     Physical Exam:  GEN: NAD  CV: RRR  Lung: Normal effort  Ab: Soft, NT/ND  Neuro: A+Ox3  Incisions: LUE with dressing and sling in place. 2+ radial pulses. Left hip dressing C/D/I. SILT intact. Motor strength 5/5. 2+ DP pulses.    Blood pressure 149/91, pulse 82, temperature 97.6 °F (36.4 °C), resp. rate 15, height 5' 8\" (1.727 m), weight 84.6 kg (186 lb 8.2 oz), SpO2 97%.,Body mass index is 28.36 kg/m².      Intake/Output Summary (Last 24 hours) at 11/25/2024 2029  Last data filed at 11/25/2024 1739  Gross per 24 hour   Intake 1750 ml   Output 350 ml   Net 1400 ml       Invasive Devices       Peripheral Intravenous Line  Duration             Peripheral IV 11/24/24 Right Antecubital 1 day              Drain  Duration             External Urinary Catheter 1 day    Urethral Catheter Latex 16 Fr. <1 day                    VTE Pharmacologic Prophylaxis: Enoxaparin (Lovenox)            "
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
## 2024-11-26 NOTE — PROGRESS NOTES
Pastoral Care Progress Note          Chaplaincy Interventions Utilized:   Empowerment: Clarified, confirmed, or reviewed information from treatment team  and Encouraged focus on present    Exploration: Explored emotional needs & resources and Explored spiritual needs & resources    Collaboration: Encouraged adherence to treatment plan     Relationship Building: Cultivated a relationship of care and support, Listened empathically, and Hospitality    Ritual:      11/26/24 1200   Clinical Encounter Type   Visited With Patient   Routine Visit Follow-up             Chaplaincy Outcomes Achieved:  Expressed gratitude     went to visit the patient but she needed a nurse's help.   got her nurse for her.   went back a bit later and visited with the patient.  We chatted for quite some time and  listened and offered support and encouragement.       Spiritual Coping Strategies Utilized:      remains available.
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
## 2024-11-27 LAB
ANION GAP SERPL CALCULATED.3IONS-SCNC: 6 MMOL/L (ref 4–13)
BASOPHILS # BLD AUTO: 0.04 THOUSANDS/ΜL (ref 0–0.1)
BASOPHILS NFR BLD AUTO: 1 % (ref 0–1)
BUN SERPL-MCNC: 12 MG/DL (ref 5–25)
CALCIUM SERPL-MCNC: 8.1 MG/DL (ref 8.4–10.2)
CHLORIDE SERPL-SCNC: 105 MMOL/L (ref 96–108)
CO2 SERPL-SCNC: 28 MMOL/L (ref 21–32)
CREAT SERPL-MCNC: 0.64 MG/DL (ref 0.6–1.3)
EOSINOPHIL # BLD AUTO: 0.07 THOUSAND/ΜL (ref 0–0.61)
EOSINOPHIL NFR BLD AUTO: 1 % (ref 0–6)
ERYTHROCYTE [DISTWIDTH] IN BLOOD BY AUTOMATED COUNT: 12.7 % (ref 11.6–15.1)
GFR SERPL CREATININE-BSD FRML MDRD: 86 ML/MIN/1.73SQ M
GLUCOSE SERPL-MCNC: 105 MG/DL (ref 65–140)
HCT VFR BLD AUTO: 30 % (ref 34.8–46.1)
HGB BLD-MCNC: 9.7 G/DL (ref 11.5–15.4)
IMM GRANULOCYTES # BLD AUTO: 0.03 THOUSAND/UL (ref 0–0.2)
IMM GRANULOCYTES NFR BLD AUTO: 0 % (ref 0–2)
LYMPHOCYTES # BLD AUTO: 2.04 THOUSANDS/ΜL (ref 0.6–4.47)
LYMPHOCYTES NFR BLD AUTO: 27 % (ref 14–44)
MCH RBC QN AUTO: 32.1 PG (ref 26.8–34.3)
MCHC RBC AUTO-ENTMCNC: 32.3 G/DL (ref 31.4–37.4)
MCV RBC AUTO: 99 FL (ref 82–98)
MONOCYTES # BLD AUTO: 0.84 THOUSAND/ΜL (ref 0.17–1.22)
MONOCYTES NFR BLD AUTO: 11 % (ref 4–12)
NEUTROPHILS # BLD AUTO: 4.65 THOUSANDS/ΜL (ref 1.85–7.62)
NEUTS SEG NFR BLD AUTO: 60 % (ref 43–75)
NRBC BLD AUTO-RTO: 0 /100 WBCS
PLATELET # BLD AUTO: 238 THOUSANDS/UL (ref 149–390)
PMV BLD AUTO: 9.4 FL (ref 8.9–12.7)
POTASSIUM SERPL-SCNC: 4 MMOL/L (ref 3.5–5.3)
RBC # BLD AUTO: 3.02 MILLION/UL (ref 3.81–5.12)
SODIUM SERPL-SCNC: 139 MMOL/L (ref 135–147)
WBC # BLD AUTO: 7.67 THOUSAND/UL (ref 4.31–10.16)

## 2024-11-27 PROCEDURE — 85025 COMPLETE CBC W/AUTO DIFF WBC: CPT

## 2024-11-27 PROCEDURE — 97535 SELF CARE MNGMENT TRAINING: CPT

## 2024-11-27 PROCEDURE — 80048 BASIC METABOLIC PNL TOTAL CA: CPT

## 2024-11-27 PROCEDURE — NC001 PR NO CHARGE: Performed by: ORTHOPAEDIC SURGERY

## 2024-11-27 PROCEDURE — 99232 SBSQ HOSP IP/OBS MODERATE 35: CPT | Performed by: SURGERY

## 2024-11-27 RX ADMIN — PANTOPRAZOLE SODIUM 40 MG: 40 TABLET, DELAYED RELEASE ORAL at 05:38

## 2024-11-27 RX ADMIN — METHOCARBAMOL 500 MG: 500 TABLET ORAL at 00:18

## 2024-11-27 RX ADMIN — METHOCARBAMOL 500 MG: 500 TABLET ORAL at 16:57

## 2024-11-27 RX ADMIN — DOCUSATE SODIUM 100 MG: 100 CAPSULE, LIQUID FILLED ORAL at 16:57

## 2024-11-27 RX ADMIN — ACETAMINOPHEN 650 MG: 325 TABLET, FILM COATED ORAL at 12:42

## 2024-11-27 RX ADMIN — DOCUSATE SODIUM 100 MG: 100 CAPSULE, LIQUID FILLED ORAL at 09:04

## 2024-11-27 RX ADMIN — SENNOSIDES 17.2 MG: 8.6 TABLET, FILM COATED ORAL at 09:04

## 2024-11-27 RX ADMIN — METHOCARBAMOL 500 MG: 500 TABLET ORAL at 05:38

## 2024-11-27 RX ADMIN — GABAPENTIN 100 MG: 100 CAPSULE ORAL at 09:04

## 2024-11-27 RX ADMIN — ACETAMINOPHEN 650 MG: 325 TABLET, FILM COATED ORAL at 16:57

## 2024-11-27 RX ADMIN — POLYETHYLENE GLYCOL 3350 17 G: 17 POWDER, FOR SOLUTION ORAL at 09:04

## 2024-11-27 RX ADMIN — OXYCODONE HYDROCHLORIDE 5 MG: 5 TABLET ORAL at 21:40

## 2024-11-27 RX ADMIN — ENOXAPARIN SODIUM 30 MG: 30 INJECTION SUBCUTANEOUS at 21:40

## 2024-11-27 RX ADMIN — METHOCARBAMOL 500 MG: 500 TABLET ORAL at 12:42

## 2024-11-27 RX ADMIN — ACETAMINOPHEN 650 MG: 325 TABLET, FILM COATED ORAL at 05:37

## 2024-11-27 RX ADMIN — GABAPENTIN 100 MG: 100 CAPSULE ORAL at 16:57

## 2024-11-27 RX ADMIN — ACETAMINOPHEN 650 MG: 325 TABLET, FILM COATED ORAL at 00:18

## 2024-11-27 RX ADMIN — GABAPENTIN 100 MG: 100 CAPSULE ORAL at 00:27

## 2024-11-27 RX ADMIN — ENOXAPARIN SODIUM 30 MG: 30 INJECTION SUBCUTANEOUS at 09:04
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
## 2024-11-27 NOTE — ARC ADMISSION
[facility]  met with patient and spouse at bedside. Reviewed [facility] program, acute rehab criteria and approval process, insurance authorization process, as well as [facility] locations and preferences. Patient's preferred [facility] location is [facility] .  [facility] Rehab folder left with patient and all questions answered. Patient was made aware that [facility] Reviewer will keep their  updated regarding referral status.
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
## 2024-11-27 NOTE — PLAN OF CARE
Problem: OCCUPATIONAL THERAPY ADULT  Goal: Performs self-care activities at highest level of function for planned discharge setting.  See evaluation for individualized goals.  Description: Treatment Interventions: ADL retraining, Functional transfer training, Endurance training, Patient/family training, Equipment evaluation/education, Compensatory technique education, Energy conservation, Activityengagement          See flowsheet documentation for full assessment, interventions and recommendations.   Outcome: Progressing  Note: Limitation: Decreased ADL status, Decreased Safe judgement during ADL, Decreased endurance, Decreased self-care trans, Decreased high-level ADLs  Prognosis: Good  Assessment: Pt seen for Occupational Therapy session with focus on activity tolerance, bed mob, functional transfers/stand pivot to bedside chair for pt engagement in LB self-care tasks and use of long handle adaptive equipment. Pt cleared by RN/  for pt participated in OT session. Pt presented supine/HOB raised pt awake/alert and agreeable to participate in therapy following pt identifiers confirmed. Pt did not report a therapy goal this session however pt was pleasant and cooperative with all therapy requests.  Pt required assist for donning socks with sock aid 2* attempting to complete one-handed unsuccessful at this time due to pt limited functional L UE. Pt able to demonstrate successful donning pants with dressing stick however pt requires increased time allowed. She was able to tolerate set up at bedside chair for participation with adaptive equipment. Pt remains appropriate for  I (Maximum Resources) Pt set up to bedside chair post session, chair alarm activated and all needs within pt reach     Rehab Resource Intensity Level, OT: I (Maximum Resource Intensity)

## 2024-11-27 NOTE — [facility] ADMISSION
Patient looks appropriate to come to [facility] pending medical stability, LOF at discharge, bed availability and insurance authorization.  [facility] admissions will continue to follow patient for progression of care and discharge readiness.

## 2024-11-27 NOTE — RESTORATIVE TECHNICIAN NOTE
Restorative Technician Note      Patient Name: Analia Evans     Restorative Tech Visit Date: 11/27/24  Note Type: Mobility  Patient Position Upon Consult: Bedside chair  Activity Performed: Ambulated  Assistive Device: Other (Comment) (HHAx1)  Patient Position at End of Consult: All needs within reach; Bedside chair      ANNA Liu
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
## 2024-11-27 NOTE — PROGRESS NOTES
Patient:    MRN:  [id number]    Haja Request ID:  [id number]    Level of care reserved:  Inpatient Rehab Facility    Partner Reserved:  [facility] - ([place]), [place] [id number] [phone]    Clinical needs requested:    Geography searched:  10 miles around [id number]    Start of Service:    Request sent:  4:47pm EST on 11/26/2024 by Dwayne Berkowitz    Partner reserved:  10:23am EST on 11/27/2024 by Dwayne Berkowitz    Choice list shared:  10:23am EST on 11/27/2024 by Dwayne Berkowitz
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
## 2024-11-27 NOTE — ASSESSMENT & PLAN NOTE
XR left hip/pelvis demonstrates a closed left femoral neck fracture. POD1 ORIF left elbow, total arthroplasty left hip.    Plan:  Appreciate orthopedics recommendations  WBAT LLE, posterior precautions, abduction pillow in bed  TXA administered  F/U endocrinology consult for fragility fx L femoral neck  PT/OT  Case management  Lovenox 28 days postoperatively  PT/OT level 1  Case management for dispo planning  Febrile overnight to 101.3, monitor fever curve and wbc over next 24h

## 2024-11-27 NOTE — PLAN OF CARE
Problem: PAIN - ADULT  Goal: Verbalizes/displays adequate comfort level or baseline comfort level  Description: Interventions:  - Encourage patient to monitor pain and request assistance  - Assess pain using appropriate pain scale  - Administer analgesics based on type and severity of pain and evaluate response  - Implement non-pharmacological measures as appropriate and evaluate response  - Consider cultural and social influences on pain and pain management  - Notify physician/advanced practitioner if interventions unsuccessful or patient reports new pain  Outcome: Progressing     Problem: INFECTION - ADULT  Goal: Absence or prevention of progression during hospitalization  Description: INTERVENTIONS:  - Assess and monitor for signs and symptoms of infection  - Monitor lab/diagnostic results  - Monitor all insertion sites, i.e. indwelling lines, tubes, and drains  - Monitor endotracheal if appropriate and nasal secretions for changes in amount and color  - [place] appropriate cooling/warming therapies per order  - Administer medications as ordered  - Instruct and encourage patient and family to use good hand hygiene technique  - Identify and instruct in appropriate isolation precautions for identified infection/condition  Outcome: Progressing     Problem: SAFETY ADULT  Goal: Patient will remain free of falls  Description: INTERVENTIONS:  - Educate patient/family on patient safety including physical limitations  - Instruct patient to call for assistance with activity   - Consult OT/PT to assist with strengthening/mobility   - Keep Call bell within reach  - Keep bed low and locked with side rails adjusted as appropriate  - Keep care items and personal belongings within reach  - Initiate and maintain comfort rounds  - Make Fall Risk Sign visible to staff  Problem: Knowledge Deficit  Goal: Patient/family/caregiver demonstrates understanding of disease process, treatment plan, medications, and discharge  instructions  Description: Complete learning assessment and assess knowledge base.  Interventions:  - Provide teaching at level of understanding  - Provide teaching via preferred learning methods  Outcome: Progressing     Problem: DISCHARGE PLANNING  Goal: Discharge to home or other facility with appropriate resources  Description: INTERVENTIONS:  - Identify barriers to discharge w/patient and caregiver  - Arrange for needed discharge resources and transportation as appropriate  - Identify discharge learning needs (meds, wound care, etc.)  - Arrange for interpretive services to assist at discharge as needed  - Refer to Case Management Department for coordinating discharge planning if the patient needs post-hospital services based on physician/advanced practitioner order or complex needs related to functional status, cognitive ability, or social support system  Outcome: Progressing     - Apply yellow socks and bracelet for high fall risk patients  - Consider moving patient to room near nurses station  Outcome: Progressing

## 2024-11-28 LAB
ANION GAP SERPL CALCULATED.3IONS-SCNC: 8 MMOL/L (ref 4–13)
BASOPHILS # BLD AUTO: 0.04 THOUSANDS/ΜL (ref 0–0.1)
BASOPHILS NFR BLD AUTO: 1 % (ref 0–1)
BUN SERPL-MCNC: 11 MG/DL (ref 5–25)
CALCIUM SERPL-MCNC: 8.1 MG/DL (ref 8.4–10.2)
CHLORIDE SERPL-SCNC: 105 MMOL/L (ref 96–108)
CO2 SERPL-SCNC: 27 MMOL/L (ref 21–32)
CREAT SERPL-MCNC: 0.58 MG/DL (ref 0.6–1.3)
EOSINOPHIL # BLD AUTO: 0.2 THOUSAND/ΜL (ref 0–0.61)
EOSINOPHIL NFR BLD AUTO: 3 % (ref 0–6)
ERYTHROCYTE [DISTWIDTH] IN BLOOD BY AUTOMATED COUNT: 12.4 % (ref 11.6–15.1)
GFR SERPL CREATININE-BSD FRML MDRD: 89 ML/MIN/1.73SQ M
GLUCOSE SERPL-MCNC: 106 MG/DL (ref 65–140)
HCT VFR BLD AUTO: 28.3 % (ref 34.8–46.1)
HGB BLD-MCNC: 9.2 G/DL (ref 11.5–15.4)
IMM GRANULOCYTES # BLD AUTO: 0.06 THOUSAND/UL (ref 0–0.2)
IMM GRANULOCYTES NFR BLD AUTO: 1 % (ref 0–2)
LYMPHOCYTES # BLD AUTO: 2.01 THOUSANDS/ΜL (ref 0.6–4.47)
LYMPHOCYTES NFR BLD AUTO: 27 % (ref 14–44)
MCH RBC QN AUTO: 32.1 PG (ref 26.8–34.3)
MCHC RBC AUTO-ENTMCNC: 32.5 G/DL (ref 31.4–37.4)
MCV RBC AUTO: 99 FL (ref 82–98)
MONOCYTES # BLD AUTO: 0.75 THOUSAND/ΜL (ref 0.17–1.22)
MONOCYTES NFR BLD AUTO: 10 % (ref 4–12)
NEUTROPHILS # BLD AUTO: 4.53 THOUSANDS/ΜL (ref 1.85–7.62)
NEUTS SEG NFR BLD AUTO: 58 % (ref 43–75)
NRBC BLD AUTO-RTO: 0 /100 WBCS
PLATELET # BLD AUTO: 230 THOUSANDS/UL (ref 149–390)
PMV BLD AUTO: 9.4 FL (ref 8.9–12.7)
POTASSIUM SERPL-SCNC: 3.9 MMOL/L (ref 3.5–5.3)
RBC # BLD AUTO: 2.87 MILLION/UL (ref 3.81–5.12)
SODIUM SERPL-SCNC: 140 MMOL/L (ref 135–147)
WBC # BLD AUTO: 7.59 THOUSAND/UL (ref 4.31–10.16)

## 2024-11-28 PROCEDURE — 97530 THERAPEUTIC ACTIVITIES: CPT

## 2024-11-28 PROCEDURE — 97116 GAIT TRAINING THERAPY: CPT

## 2024-11-28 PROCEDURE — 99232 SBSQ HOSP IP/OBS MODERATE 35: CPT | Performed by: SURGERY

## 2024-11-28 PROCEDURE — 85025 COMPLETE CBC W/AUTO DIFF WBC: CPT

## 2024-11-28 PROCEDURE — NC001 PR NO CHARGE: Performed by: ORTHOPAEDIC SURGERY

## 2024-11-28 PROCEDURE — 80048 BASIC METABOLIC PNL TOTAL CA: CPT

## 2024-11-28 RX ADMIN — GABAPENTIN 100 MG: 100 CAPSULE ORAL at 09:39

## 2024-11-28 RX ADMIN — ENOXAPARIN SODIUM 30 MG: 30 INJECTION SUBCUTANEOUS at 21:00

## 2024-11-28 RX ADMIN — ACETAMINOPHEN 650 MG: 325 TABLET, FILM COATED ORAL at 11:13

## 2024-11-28 RX ADMIN — ACETAMINOPHEN 650 MG: 325 TABLET, FILM COATED ORAL at 00:44

## 2024-11-28 RX ADMIN — PANTOPRAZOLE SODIUM 40 MG: 40 TABLET, DELAYED RELEASE ORAL at 06:02

## 2024-11-28 RX ADMIN — GABAPENTIN 100 MG: 100 CAPSULE ORAL at 17:09

## 2024-11-28 RX ADMIN — ACETAMINOPHEN 650 MG: 325 TABLET, FILM COATED ORAL at 06:02

## 2024-11-28 RX ADMIN — METHOCARBAMOL 500 MG: 500 TABLET ORAL at 06:02

## 2024-11-28 RX ADMIN — SENNOSIDES 17.2 MG: 8.6 TABLET, FILM COATED ORAL at 09:39

## 2024-11-28 RX ADMIN — METHOCARBAMOL 500 MG: 500 TABLET ORAL at 17:09

## 2024-11-28 RX ADMIN — METHOCARBAMOL 500 MG: 500 TABLET ORAL at 11:13

## 2024-11-28 RX ADMIN — DOCUSATE SODIUM 100 MG: 100 CAPSULE, LIQUID FILLED ORAL at 17:09

## 2024-11-28 RX ADMIN — OXYCODONE HYDROCHLORIDE 5 MG: 5 TABLET ORAL at 21:05

## 2024-11-28 RX ADMIN — POLYETHYLENE GLYCOL 3350 17 G: 17 POWDER, FOR SOLUTION ORAL at 09:39

## 2024-11-28 RX ADMIN — METHOCARBAMOL 500 MG: 500 TABLET ORAL at 00:44

## 2024-11-28 RX ADMIN — ACETAMINOPHEN 650 MG: 325 TABLET, FILM COATED ORAL at 17:09

## 2024-11-28 RX ADMIN — DOCUSATE SODIUM 100 MG: 100 CAPSULE, LIQUID FILLED ORAL at 09:39

## 2024-11-28 RX ADMIN — GABAPENTIN 100 MG: 100 CAPSULE ORAL at 00:51

## 2024-11-28 RX ADMIN — ENOXAPARIN SODIUM 30 MG: 30 INJECTION SUBCUTANEOUS at 09:39

## 2024-11-28 RX ADMIN — Medication 2.5 MG: at 16:07

## 2024-11-28 NOTE — PLAN OF CARE
Problem: PAIN - ADULT  Goal: Verbalizes/displays adequate comfort level or baseline comfort level  Description: Interventions:  - Encourage patient to monitor pain and request assistance  - Assess pain using appropriate pain scale  - Administer analgesics based on type and severity of pain and evaluate response  - Implement non-pharmacological measures as appropriate and evaluate response  - Consider cultural and social influences on pain and pain management  - Notify physician/advanced practitioner if interventions unsuccessful or patient reports new pain  Outcome: Progressing     Problem: INFECTION - ADULT  Goal: Absence or prevention of progression during hospitalization  Description: INTERVENTIONS:  - Assess and monitor for signs and symptoms of infection  - Monitor lab/diagnostic results  - Monitor all insertion sites, i.e. indwelling lines, tubes, and drains  - Monitor endotracheal if appropriate and nasal secretions for changes in amount and color  - [place] appropriate cooling/warming therapies per order  - Administer medications as ordered  - Instruct and encourage patient and family to use good hand hygiene technique  - Identify and instruct in appropriate isolation precautions for identified infection/condition  Outcome: Progressing     Problem: Potential for Falls  Goal: Patient will remain free of falls  Description: INTERVENTIONS:  - Educate patient/family on patient safety including physical limitations  - Instruct patient to call for assistance with activity   - Consult OT/PT to assist with strengthening/mobility   - Keep Call bell within reach  - Keep bed low and locked with side rails adjusted as appropriate  - Keep care items and personal belongings within reach  - Initiate and maintain comfort rounds  - Make Fall Risk Sign visible to staff  Problem: Knowledge Deficit  Goal: Patient/family/caregiver demonstrates understanding of disease process, treatment plan, medications, and discharge  instructions  Description: Complete learning assessment and assess knowledge base.  Interventions:  - Provide teaching at level of understanding  - Provide teaching via preferred learning methods  Outcome: Progressing     Problem: DISCHARGE PLANNING  Goal: Discharge to home or other facility with appropriate resources  Description: INTERVENTIONS:  - Identify barriers to discharge w/patient and caregiver  - Arrange for needed discharge resources and transportation as appropriate  - Identify discharge learning needs (meds, wound care, etc.)  - Arrange for interpretive services to assist at discharge as needed  - Refer to Case Management Department for coordinating discharge planning if the patient needs post-hospital services based on physician/advanced practitioner order or complex needs related to functional status, cognitive ability, or social support system  Outcome: Progressing     - Apply yellow socks and bracelet for high fall risk patients  - Consider moving patient to room near nurses station  Outcome: Progressing
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
## 2024-11-28 NOTE — PHYSICAL THERAPY NOTE
Physical Therapy Progress Note      11/28/24 1144   PT Last Visit   PT Visit Date 11/28/24   Note Type   Note Type Treatment   Pain Assessment   Pain Assessment Tool 0-10   Pain Score 8   Hospital Pain Intervention(s) Ambulation/increased activity;Repositioned   Restrictions/Precautions   Weight Bearing Precautions Per Order Yes   LUE Weight Bearing Per Order (S)  NWB   LLE Weight Bearing Per Order WBAT   Braces or Orthoses Sling   Other Precautions WBS;THR  (Posterior hip precautions)   General   Chart Reviewed Yes   Family/Caregiver Present No   Cognition   Overall Cognitive Status WFL   Arousal/Participation Alert;Cooperative   Comments Patient is pleasant and cooperative.   Bed Mobility   Supine to Sit 4  Minimal assistance   Additional items Assist x 1;Increased time required;HOB elevated   Transfers   Sit to Stand 4  Minimal assistance   Additional items Assist x 1;Increased time required   Stand to Sit 4  Minimal assistance   Additional items Assist x 1;Increased time required   Ambulation/Elevation   Gait pattern Excessively slow;Short stride;Decreased foot clearance;Improper Weight shift   Gait Assistance 3  Moderate assist   Additional items Assist x 1  (HHA)   Assistive Device Other (Comment)  (HHA on right)   Distance 20 feet x1, 30 feet x1, 20 feet x1 30 feet x1, with standing rest breaks   Balance   Static Sitting Fair   Static Standing Fair -   Ambulatory Poor +   Endurance Deficit   Endurance Deficit Yes   Endurance Deficit Description Fatigue, some pain, but improving each day   Activity Tolerance   Activity Tolerance Patient tolerated treatment well;Patient limited by pain   Nurse Made Aware Appropriate to see per RN   Exercises   Knee AROM Long Arc Quad Sitting;10 reps;AAROM;AROM;Bilateral  (L AAROM, R AROM)   Assessment   Prognosis Excellent   Problem List Decreased strength;Decreased range of motion;Decreased endurance;Impaired balance;Decreased mobility;Orthopedic restrictions;Pain    Assessment Patient lying supine in  bed, agreeable and eager to participate in therapy. She was able to transfer with min A to sit EOB, as she is unable to use LUE. She was able to stand with min A and HHA for ambulation. She did ambulate further with less pain this visit but does require standing rest breaks due to some pain, fatigue and weakness. She is slowly progressing towards goals, but would continue to benefit from skilled PT to maximize functional independence.   Goals   Patient Goals To get better and continue to have less pain   STG Expiration Date 12/10/24   Plan   Treatment/Interventions Functional transfer training;Therapeutic exercise;Endurance training;Bed mobility;Gait training;Spoke to nursing   PT Frequency 3-5x/wk   Discharge Recommendation   Rehab Resource Intensity Level, PT I (Maximum Resource Intensity)   Equipment Recommended   (Platform walker if able)   AM-PAC Basic Mobility Inpatient   Turning in Flat Bed Without Bedrails 3   Lying on Back to Sitting on Edge of Flat Bed Without Bedrails 3   Moving Bed to Chair 2   Standing Up From Chair Using Arms 2   Walk in Room 2   Climb 3-5 Stairs With Railing 1   Basic Mobility Inpatient Raw Score 13   Basic Mobility Standardized Score 33.99   [facility] Highest Level Of Mobility   -[facility] Goal 4: Move to chair/commode   -HLM Achieved 7: Walk 25 feet or more         Alicia Tiwari, PTA
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
## 2024-11-28 NOTE — PROGRESS NOTES
Progress Note - Orthopedics   Name: Analia Evans 79 y.o. female I MRN: [id number]  [facility] I Date of Admission: 11/24/2024   Date of Service: 11/29/2024 I Hospital Day: 5    Assessment & Plan  Left displaced femoral neck fracture (HCC)  Status post Left  Left ARTHROPLASTY HIP TOTAL, LEFT, OPEN REDUCTION W/ INTERNAL FIXATION (ORIF) ELBOW, LEFT DOS 11/25/24 stable postoperatively    WBAT LLE, posterior hip precautions, abduction pillow in bed  NWB LUE -- sling for comfort, anterior slab to stay on until POD 7 -- okay to remove and begin range of motion at that point  PT/OT -- okay to use platform walker for ambulation for LUE  Incentive spirometry  Pain control  DVT ppx: lovenox 28 days postoperatively  Diet: okay for diet per primary team  Monitor for acute blood loss anemia and administer or recommend IVF/prbc as indicated for greater than 2 gram Hgb drop or Hgb < 7    Closed fracture of left elbow  See above  Osteopenia of multiple sites      Please contact the SecureChat role for the Orthopedics service with any questions/concerns.    Subjective   No acute events, no acute distress. Denies fever/chills, SOB. Pain well controlled. Has been ambulating without much difficulty. Has had gas but no BM yet.    Objective      Temp:  [98.6 °F (37 °C)-99.4 °F (37.4 °C)] 98.9 °F (37.2 °C)  HR:  [77-93] 77  BP: (103-126)/(56-79) 124/69  Resp:  [16-18] 16  SpO2:  [93 %-96 %] 96 %  O2 Device: None (Room air)  O2 Device: None (Room air)          I/O         11/27 0701  11/28 0700 11/28 0701  11/29 0700    P.O. 480 1542    Total Intake(mL/kg) 480 (5.7) 1542 (18.2)    Urine (mL/kg/hr) 800 (0.4) 275 (0.1)    Total Output 800 275    Net -320 +1267          Unmeasured Urine Occurrence 4 x 3 x            Physical Exam   Musculoskeletal: left upper and lower  Dressing C/D/I   TTP savi-incisionally  Sensation intact to light touch adelina/saph/sp/dp/tib  Motor intact m/r/u/ain/pin  Digits warm well perfused with brisk cap  "refill  No calf swelling or ttp  SILT and motor intact RLE sp/dp/s/s/t and EHL/FHL PF/DF      Lab Results: I have reviewed the following results:  Recent Labs     11/27/24  0534 11/28/24  0541   WBC 7.67 7.59   HGB 9.7* 9.2*   HCT 30.0* 28.3*    230   BUN 12 11   CREATININE 0.64 0.58*     Blood Culture:  No results found for: \"BLOODCX\"  Wound Culture: No results found for: \"WOUNDCULT\"  "
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
## 2024-11-28 NOTE — PLAN OF CARE
Problem: PHYSICAL THERAPY ADULT  Goal: Performs mobility at highest level of function for planned discharge setting.  See evaluation for individualized goals.  Description: Treatment/Interventions: Functional transfer training, LE strengthening/ROM, Elevations, Therapeutic exercise, Endurance training, Patient/family training, Equipment eval/education, Bed mobility, Gait training, Continued evaluation, Spoke to nursing, OT  Equipment Recommended: Walker       See flowsheet documentation for full assessment, interventions and recommendations.  Outcome: Progressing  Note: Prognosis: Excellent  Problem List: Decreased strength, Decreased range of motion, Decreased endurance, Impaired balance, Decreased mobility, Orthopedic restrictions, Pain  Assessment: Patient lying supine in  bed, agreeable and eager to participate in therapy. She was able to transfer with min A to sit EOB, as she is unable to use LUE. She was able to stand with min A and HHA for ambulation. She did ambulate further with less pain this visit but does require standing rest breaks due to some pain, fatigue and weakness. She is slowly progressing towards goals, but would continue to benefit from skilled PT to maximize functional independence.        Rehab Resource Intensity Level, PT: I (Maximum Resource Intensity)    See flowsheet documentation for full assessment.
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
## 2024-11-29 LAB
ANION GAP SERPL CALCULATED.3IONS-SCNC: 8 MMOL/L (ref 4–13)
BUN SERPL-MCNC: 12 MG/DL (ref 5–25)
CALCIUM SERPL-MCNC: 8.7 MG/DL (ref 8.4–10.2)
CHLORIDE SERPL-SCNC: 104 MMOL/L (ref 96–108)
CO2 SERPL-SCNC: 28 MMOL/L (ref 21–32)
CREAT SERPL-MCNC: 0.59 MG/DL (ref 0.6–1.3)
GFR SERPL CREATININE-BSD FRML MDRD: 88 ML/MIN/1.73SQ M
GLUCOSE SERPL-MCNC: 97 MG/DL (ref 65–140)
POTASSIUM SERPL-SCNC: 4.1 MMOL/L (ref 3.5–5.3)
SODIUM SERPL-SCNC: 140 MMOL/L (ref 135–147)

## 2024-11-29 PROCEDURE — 99231 SBSQ HOSP IP/OBS SF/LOW 25: CPT | Performed by: SURGERY

## 2024-11-29 PROCEDURE — 80048 BASIC METABOLIC PNL TOTAL CA: CPT

## 2024-11-29 PROCEDURE — NC001 PR NO CHARGE: Performed by: ORTHOPAEDIC SURGERY

## 2024-11-29 PROCEDURE — 97530 THERAPEUTIC ACTIVITIES: CPT

## 2024-11-29 RX ORDER — BISACODYL 10 MG
10 SUPPOSITORY, RECTAL RECTAL DAILY
Status: DISCONTINUED | OUTPATIENT
Start: 2024-11-29 | End: 2024-12-03 | Stop reason: HOSPADM

## 2024-11-29 RX ORDER — SODIUM PHOSPHATE, DIBASIC AND SODIUM PHOSPHATE, MONOBASIC 3.5; 9.5 G/66ML; G/66ML
1 ENEMA RECTAL ONCE
Status: DISCONTINUED | OUTPATIENT
Start: 2024-11-29 | End: 2024-11-29

## 2024-11-29 RX ADMIN — GABAPENTIN 100 MG: 100 CAPSULE ORAL at 00:46

## 2024-11-29 RX ADMIN — ACETAMINOPHEN 650 MG: 325 TABLET, FILM COATED ORAL at 00:46

## 2024-11-29 RX ADMIN — ACETAMINOPHEN 650 MG: 325 TABLET, FILM COATED ORAL at 17:02

## 2024-11-29 RX ADMIN — POLYETHYLENE GLYCOL 3350 17 G: 17 POWDER, FOR SOLUTION ORAL at 08:08

## 2024-11-29 RX ADMIN — METHOCARBAMOL 500 MG: 500 TABLET ORAL at 05:18

## 2024-11-29 RX ADMIN — GABAPENTIN 100 MG: 100 CAPSULE ORAL at 16:58

## 2024-11-29 RX ADMIN — METHOCARBAMOL 500 MG: 500 TABLET ORAL at 00:46

## 2024-11-29 RX ADMIN — ACETAMINOPHEN 650 MG: 325 TABLET, FILM COATED ORAL at 05:18

## 2024-11-29 RX ADMIN — ENOXAPARIN SODIUM 30 MG: 30 INJECTION SUBCUTANEOUS at 21:16

## 2024-11-29 RX ADMIN — ENOXAPARIN SODIUM 30 MG: 30 INJECTION SUBCUTANEOUS at 08:08

## 2024-11-29 RX ADMIN — ACETAMINOPHEN 650 MG: 325 TABLET, FILM COATED ORAL at 11:15

## 2024-11-29 RX ADMIN — GABAPENTIN 100 MG: 100 CAPSULE ORAL at 10:00

## 2024-11-29 RX ADMIN — Medication 3 MG: at 21:16

## 2024-11-29 RX ADMIN — PANTOPRAZOLE SODIUM 40 MG: 40 TABLET, DELAYED RELEASE ORAL at 05:18

## 2024-11-29 RX ADMIN — DOCUSATE SODIUM 100 MG: 100 CAPSULE, LIQUID FILLED ORAL at 17:03

## 2024-11-29 RX ADMIN — METHOCARBAMOL 500 MG: 500 TABLET ORAL at 17:03

## 2024-11-29 RX ADMIN — DOCUSATE SODIUM 100 MG: 100 CAPSULE, LIQUID FILLED ORAL at 08:08

## 2024-11-29 RX ADMIN — SENNOSIDES 17.2 MG: 8.6 TABLET, FILM COATED ORAL at 08:08

## 2024-11-29 RX ADMIN — METHOCARBAMOL 500 MG: 500 TABLET ORAL at 11:15

## 2024-11-29 RX ADMIN — MINERAL OIL 1 ENEMA: 100 ENEMA RECTAL at 11:15

## 2024-11-29 NOTE — ASSESSMENT & PLAN NOTE
XR left elbow (AP, lateral) demonstrates left elbow fracture.     Plan:  - Appreciate orthopedics recommendations  - NWB LUE  - Sling for comfort, anterior slab to stay on until postop day 7  - okay to use platform walker for ambulation for LUE

## 2024-11-29 NOTE — PLAN OF CARE
Problem: Potential for Falls  Goal: Patient will remain free of falls  Description: INTERVENTIONS:  - Educate patient/family on patient safety including physical limitations  - Instruct patient to call for assistance with activity   - Consult OT/PT to assist with strengthening/mobility   - Keep Call bell within reach  - Keep bed low and locked with side rails adjusted as appropriate  - Keep care items and personal belongings within reach  - Initiate and maintain comfort rounds  - Make Fall Risk Sign visible to staff  - Offer Toileting every 1 Hours, in advance of need  - Initiate/Maintain alarm  - Obtain necessary fall risk management equipment  - Apply yellow socks and bracelet for high fall risk patients  - Consider moving patient to room near nurses station  Outcome: Progressing     Problem: PAIN - ADULT  Goal: Verbalizes/displays adequate comfort level or baseline comfort level  Description: Interventions:  - Encourage patient to monitor pain and request assistance  - Assess pain using appropriate pain scale  - Administer analgesics based on type and severity of pain and evaluate response  - Implement non-pharmacological measures as appropriate and evaluate response  - Consider cultural and social influences on pain and pain management  - Notify physician/advanced practitioner if interventions unsuccessful or patient reports new pain  Outcome: Progressing     Problem: INFECTION - ADULT  Goal: Absence or prevention of progression during hospitalization  Description: INTERVENTIONS:  - Assess and monitor for signs and symptoms of infection  - Monitor lab/diagnostic results  - Monitor all insertion sites, i.e. indwelling lines, tubes, and drains  - Monitor endotracheal if appropriate and nasal secretions for changes in amount and color  - [place] appropriate cooling/warming therapies per order  - Administer medications as ordered  - Instruct and encourage patient and family to use good hand hygiene technique  -  Identify and instruct in appropriate isolation precautions for identified infection/condition  Outcome: Progressing  Goal: Absence of fever/infection during neutropenic period  Description: INTERVENTIONS:  - Monitor WBC    Outcome: Progressing     Problem: SAFETY ADULT  Goal: Patient will remain free of falls  Description: INTERVENTIONS:  - Educate patient/family on patient safety including physical limitations  - Instruct patient to call for assistance with activity   - Consult OT/PT to assist with strengthening/mobility   - Keep Call bell within reach  - Keep bed low and locked with side rails adjusted as appropriate  - Keep care items and personal belongings within reach  - Initiate and maintain comfort rounds  - Make Fall Risk Sign visible to staff  - Offer Toileting every 1 Hours, in advance of need  - Initiate/Maintain alarm  - Obtain necessary fall risk management equipment  - Apply yellow socks and bracelet for high fall risk patients  - Consider moving patient to room near nurses station  Outcome: Progressing  Goal: Maintain or return to baseline ADL function  Description: INTERVENTIONS:  -  Assess patient's ability to carry out ADLs; assess patient's baseline for ADL function and identify physical deficits which impact ability to perform ADLs (bathing, care of mouth/teeth, toileting, grooming, dressing, etc.)  - Assess/evaluate cause of self-care deficits   - Assess range of motion  - Assess patient's mobility; develop plan if impaired  - Assess patient's need for assistive devices and provide as appropriate  - Encourage maximum independence but intervene and supervise when necessary  - Involve family in performance of ADLs  - Assess for home care needs following discharge   - Consider OT consult to assist with ADL evaluation and planning for discharge  - Provide patient education as appropriate  Outcome: Progressing  Goal: Maintains/Returns to pre admission functional level  Description: INTERVENTIONS:  -  Perform AM-PAC 6 Click Basic Mobility/ Daily Activity assessment daily.  - Set and communicate daily mobility goal to care team and patient/family/caregiver.   - Collaborate with rehabilitation services on mobility goals if consulted  - Perform Range of Motion 3 times a day.  - Reposition patient every 2 hours.  - Dangle patient 3 times a day  - Stand patient 3 times a day  - Ambulate patient 3 times a day  - Out of bed to chair 3 times a day   - Out of bed for meals 3 times a day  - Out of bed for toileting  - Record patient progress and toleration of activity level   Outcome: Progressing     Problem: DISCHARGE PLANNING  Goal: Discharge to home or other facility with appropriate resources  Description: INTERVENTIONS:  - Identify barriers to discharge w/patient and caregiver  - Arrange for needed discharge resources and transportation as appropriate  - Identify discharge learning needs (meds, wound care, etc.)  - Arrange for interpretive services to assist at discharge as needed  - Refer to Case Management Department for coordinating discharge planning if the patient needs post-hospital services based on physician/advanced practitioner order or complex needs related to functional status, cognitive ability, or social support system  Outcome: Progressing     Problem: Knowledge Deficit  Goal: Patient/family/caregiver demonstrates understanding of disease process, treatment plan, medications, and discharge instructions  Description: Complete learning assessment and assess knowledge base.  Interventions:  - Provide teaching at level of understanding  - Provide teaching via preferred learning methods  Outcome: Progressing     Problem: SKIN/TISSUE INTEGRITY - ADULT  Goal: Incision(s), wounds(s) or drain site(s) healing without S/S of infection  Description: INTERVENTIONS  - Assess and document dressing, incision, wound bed, drain sites and surrounding tissue  - Provide patient and family education  - Perform skin  care/dressing changes as ordered  Outcome: Progressing     Problem: MUSCULOSKELETAL - ADULT  Goal: Maintain or return mobility to safest level of function  Description: INTERVENTIONS:  - Assess patient's ability to carry out ADLs; assess patient's baseline for ADL function and identify physical deficits which impact ability to perform ADLs (bathing, care of mouth/teeth, toileting, grooming, dressing, etc.)  - Assess/evaluate cause of self-care deficits   - Assess range of motion  - Assess patient's mobility  - Assess patient's need for assistive devices and provide as appropriate  - Encourage maximum independence but intervene and supervise when necessary  - Involve family in performance of ADLs  - Assess for home care needs following discharge   - Consider OT consult to assist with ADL evaluation and planning for discharge  - Provide patient education as appropriate  Outcome: Progressing  Goal: Maintain proper alignment of affected body part  Description: INTERVENTIONS:  - Support, maintain and protect limb and body alignment  - Provide patient/ family with appropriate education  Outcome: Progressing
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
## 2024-11-29 NOTE — CASE MANAGEMENT
[facility] received request for authorization from Care Manager.  Authorization request submitted for: Acute Rehab  Facility Name: [facility] NPI: [id number]   Facility MD: Ashley DePadua NPI: [id number]  Authorization initiated by contacting insurance: Kaylee   Via: [employer]   Clinicals submitted via Portal attachment   Pending Reference #: [id number]     Care Manager notified: Dwayne Berkowitz     Updates to authorization status will be noted in chart. Please reach out to CM for updates on any clinical information.
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
## 2024-11-29 NOTE — PLAN OF CARE
Problem: OCCUPATIONAL THERAPY ADULT  Goal: Performs self-care activities at highest level of function for planned discharge setting.  See evaluation for individualized goals.  Description: Treatment Interventions: ADL retraining, Functional transfer training, Endurance training, Patient/family training, Equipment evaluation/education, Compensatory technique education, Energy conservation, Activityengagement          See flowsheet documentation for full assessment, interventions and recommendations.   Outcome: Progressing  Note: Limitation: Decreased ADL status, Decreased Safe judgement during ADL, Decreased endurance, Decreased self-care trans, Decreased high-level ADLs  Prognosis: Good  Assessment: Pt seen on this date for OT session focusing on ADL retraining, body mechanics, transfer retraining, increasing activity tolerance/endurance  to increase ability to participate in ADL/functional tasks. Pt was found in bed and was left in chair w/ all needs within reach, chair alarm on. Pt completed bed mob w mod ax1, sts and fm w min ax1 c HHA. Grooming tasks completed w S while pt sitting in chair, using RUE to brush hair. Pt required mod a for lb dressing of donning socks- required assistance for adjusting. Pt w/ improvements in endurance, transfer ability, adl task completion, however is still limited 2* decreased ADL/High-level ADL status, decreased activity tolerance/endurance, decreased self-care trans.   The patient's raw score on the AM-PAC Daily Activity Inpatient Short Form is 14. A raw score of less than 19 suggests the patient may benefit from discharge to post-acute rehabilitation services. Please refer to the recommendation of the Occupational Therapist for safe discharge planning.   Recommending pt D/C to level 1  when medically stable. Pt will continue to benefit from acute OT services to meet goals.     Rehab Resource Intensity Level, OT: I (Maximum Resource Intensity)
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
## 2024-11-30 PROCEDURE — 99232 SBSQ HOSP IP/OBS MODERATE 35: CPT | Performed by: SURGERY

## 2024-11-30 PROCEDURE — 97116 GAIT TRAINING THERAPY: CPT

## 2024-11-30 RX ADMIN — DOCUSATE SODIUM 100 MG: 100 CAPSULE, LIQUID FILLED ORAL at 17:06

## 2024-11-30 RX ADMIN — ACETAMINOPHEN 650 MG: 325 TABLET, FILM COATED ORAL at 05:04

## 2024-11-30 RX ADMIN — PANTOPRAZOLE SODIUM 40 MG: 40 TABLET, DELAYED RELEASE ORAL at 05:04

## 2024-11-30 RX ADMIN — METHOCARBAMOL 500 MG: 500 TABLET ORAL at 05:04

## 2024-11-30 RX ADMIN — METHOCARBAMOL 500 MG: 500 TABLET ORAL at 01:11

## 2024-11-30 RX ADMIN — ENOXAPARIN SODIUM 30 MG: 30 INJECTION SUBCUTANEOUS at 21:30

## 2024-11-30 RX ADMIN — GABAPENTIN 100 MG: 100 CAPSULE ORAL at 09:55

## 2024-11-30 RX ADMIN — ACETAMINOPHEN 650 MG: 325 TABLET, FILM COATED ORAL at 01:11

## 2024-11-30 RX ADMIN — ACETAMINOPHEN 650 MG: 325 TABLET, FILM COATED ORAL at 12:29

## 2024-11-30 RX ADMIN — ENOXAPARIN SODIUM 30 MG: 30 INJECTION SUBCUTANEOUS at 08:33

## 2024-11-30 RX ADMIN — GABAPENTIN 100 MG: 100 CAPSULE ORAL at 01:11

## 2024-11-30 RX ADMIN — ACETAMINOPHEN 650 MG: 325 TABLET, FILM COATED ORAL at 17:06

## 2024-11-30 RX ADMIN — METHOCARBAMOL 500 MG: 500 TABLET ORAL at 12:30

## 2024-11-30 RX ADMIN — Medication 3 MG: at 21:30

## 2024-11-30 RX ADMIN — GABAPENTIN 100 MG: 100 CAPSULE ORAL at 17:06

## 2024-11-30 RX ADMIN — METHOCARBAMOL 500 MG: 500 TABLET ORAL at 17:06

## 2024-11-30 NOTE — PLAN OF CARE
Problem: Potential for Falls  Goal: Patient will remain free of falls  Description: INTERVENTIONS:  - Educate patient/family on patient safety including physical limitations  - Instruct patient to call for assistance with activity   - Consult OT/PT to assist with strengthening/mobility   - Keep Call bell within reach  - Keep bed low and locked with side rails adjusted as appropriate  - Keep care items and personal belongings within reach  - Initiate and maintain comfort rounds  - Make Fall Risk Sign visible to staff  - Offer Toileting every 1 Hours, in advance of need  - Initiate/Maintain alarm  - Obtain necessary fall risk management equipment  - Apply yellow socks and bracelet for high fall risk patients  - Consider moving patient to room near nurses station  Outcome: Progressing     Problem: PAIN - ADULT  Goal: Verbalizes/displays adequate comfort level or baseline comfort level  Description: Interventions:  - Encourage patient to monitor pain and request assistance  - Assess pain using appropriate pain scale  - Administer analgesics based on type and severity of pain and evaluate response  - Implement non-pharmacological measures as appropriate and evaluate response  - Consider cultural and social influences on pain and pain management  - Notify physician/advanced practitioner if interventions unsuccessful or patient reports new pain  Outcome: Progressing     Problem: INFECTION - ADULT  Goal: Absence or prevention of progression during hospitalization  Description: INTERVENTIONS:  - Assess and monitor for signs and symptoms of infection  - Monitor lab/diagnostic results  - Monitor all insertion sites, i.e. indwelling lines, tubes, and drains  - Monitor endotracheal if appropriate and nasal secretions for changes in amount and color  - [place] appropriate cooling/warming therapies per order  - Administer medications as ordered  - Instruct and encourage patient and family to use good hand hygiene technique  -  Identify and instruct in appropriate isolation precautions for identified infection/condition  Outcome: Progressing     Problem: SAFETY ADULT  Goal: Patient will remain free of falls  Description: INTERVENTIONS:  - Educate patient/family on patient safety including physical limitations  - Instruct patient to call for assistance with activity   - Consult OT/PT to assist with strengthening/mobility   - Keep Call bell within reach  - Keep bed low and locked with side rails adjusted as appropriate  - Keep care items and personal belongings within reach  - Initiate and maintain comfort rounds  - Make Fall Risk Sign visible to staff  - Offer Toileting every 1 Hours, in advance of need  - Initiate/Maintain alarm  - Obtain necessary fall risk management equipment  - Apply yellow socks and bracelet for high fall risk patients  - Consider moving patient to room near nurses station  Outcome: Progressing  Goal: Maintain or return to baseline ADL function  Description: INTERVENTIONS:  -  Assess patient's ability to carry out ADLs; assess patient's baseline for ADL function and identify physical deficits which impact ability to perform ADLs (bathing, care of mouth/teeth, toileting, grooming, dressing, etc.)  - Assess/evaluate cause of self-care deficits   - Assess range of motion  - Assess patient's mobility; develop plan if impaired  - Assess patient's need for assistive devices and provide as appropriate  - Encourage maximum independence but intervene and supervise when necessary  - Involve family in performance of ADLs  - Assess for home care needs following discharge   - Consider OT consult to assist with ADL evaluation and planning for discharge  - Provide patient education as appropriate  Outcome: Progressing  Goal: Maintains/Returns to pre admission functional level  Description: INTERVENTIONS:  - Perform AM-PAC 6 Click Basic Mobility/ Daily Activity assessment daily.  - Set and communicate daily mobility goal to care team  and patient/family/caregiver.   - Collaborate with rehabilitation services on mobility goals if consulted  - Perform Range of Motion 3 times a day.  - Reposition patient every 2 hours.  - Dangle patient 3 times a day  - Stand patient 3 times a day  - Ambulate patient 3 times a day  - Out of bed to chair 3 times a day   - Out of bed for meals 3 times a day  - Out of bed for toileting  - Record patient progress and toleration of activity level   Outcome: Progressing     Problem: DISCHARGE PLANNING  Goal: Discharge to home or other facility with appropriate resources  Description: INTERVENTIONS:  - Identify barriers to discharge w/patient and caregiver  - Arrange for needed discharge resources and transportation as appropriate  - Identify discharge learning needs (meds, wound care, etc.)  - Arrange for interpretive services to assist at discharge as needed  - Refer to Case Management Department for coordinating discharge planning if the patient needs post-hospital services based on physician/advanced practitioner order or complex needs related to functional status, cognitive ability, or social support system  Outcome: Progressing     Problem: Knowledge Deficit  Goal: Patient/family/caregiver demonstrates understanding of disease process, treatment plan, medications, and discharge instructions  Description: Complete learning assessment and assess knowledge base.  Interventions:  - Provide teaching at level of understanding  - Provide teaching via preferred learning methods  Outcome: Progressing     Problem: SKIN/TISSUE INTEGRITY - ADULT  Goal: Incision(s), wounds(s) or drain site(s) healing without S/S of infection  Description: INTERVENTIONS  - Assess and document dressing, incision, wound bed, drain sites and surrounding tissue  - Provide patient and family education  - Perform skin care/dressing changes as ordered  Outcome: Progressing     Problem: MUSCULOSKELETAL - ADULT  Goal: Maintain or return mobility to safest  level of function  Description: INTERVENTIONS:  - Assess patient's ability to carry out ADLs; assess patient's baseline for ADL function and identify physical deficits which impact ability to perform ADLs (bathing, care of mouth/teeth, toileting, grooming, dressing, etc.)  - Assess/evaluate cause of self-care deficits   - Assess range of motion  - Assess patient's mobility  - Assess patient's need for assistive devices and provide as appropriate  - Encourage maximum independence but intervene and supervise when necessary  - Involve family in performance of ADLs  - Assess for home care needs following discharge   - Consider OT consult to assist with ADL evaluation and planning for discharge  - Provide patient education as appropriate  Outcome: Progressing  Goal: Maintain proper alignment of affected body part  Description: INTERVENTIONS:  - Support, maintain and protect limb and body alignment  - Provide patient/ family with appropriate education  Outcome: Progressing

## 2024-11-30 NOTE — PHYSICAL THERAPY NOTE
"                                                                                  PHYSICAL THERAPY NOTE          Patient Name: Analia Evans  Today's Date: 11/30/2024 11/30/24 1138   PT Last Visit   PT Visit Date 11/30/24   Note Type   Note Type Treatment for insurance authorization   Pain Assessment   Pain Assessment Tool 0-10   Pain Score 8  (when taking steps with left leg)   Pain Location/Orientation Orientation: Left;Location: Leg   Hospital Pain Intervention(s) Repositioned;Ambulation/increased activity   Precautions   Total Hip Replacement   (posterior hip precautions)   Restrictions/Precautions   Weight Bearing Precautions Per Order Yes   LUE Weight Bearing Per Order (S)  NWB   LLE Weight Bearing Per Order WBAT   Braces or Orthoses Sling   Other Precautions THR;WBS   General   Chart Reviewed Yes   Family/Caregiver Present Yes  (spouse)   Cognition   Overall Cognitive Status WFL   Orientation Level Oriented X4   Subjective   Subjective \"I want to go to rehab, I would feel more confident getting cleared there before i go home\"   Bed Mobility   Supine to Sit Unable to assess   Sit to Supine Unable to assess   Additional Comments pt started and ended session in bedside chair   Transfers   Sit to Stand 4  Minimal assistance   Additional items Assist x 1;Increased time required  (CGA)   Stand to Sit 4  Minimal assistance   Additional items Assist x 1  (CGA)   Ambulation/Elevation   Gait pattern Antalgic;Decreased L stance;Short stride;Improper Weight shift;Excessively slow   Gait Assistance 4  Minimal assist   Additional items Assist x 1  (CGA)   Assistive Device None   Distance 35'x2   Stair Management Assistance Not tested   Balance   Static Sitting Fair +   Dynamic Sitting Fair   Static Standing Fair   Dynamic Standing Fair -   Ambulatory Fair -   Endurance Deficit   Endurance Deficit Yes   Endurance Deficit Description limited by muscular endurance, pain, confidence   Activity Tolerance   Activity " "Tolerance Patient limited by fatigue;Patient limited by pain   Nurse Made Aware Nursing cleared pt for therapy   Assessment   Prognosis Good   Problem List Decreased strength;Decreased endurance;Impaired balance;Decreased mobility;Decreased coordination;Orthopedic restrictions;Pain   Assessment Pt seen for treatment session today focused on assessing appropriateness for level 1 rehab and improving activity tolerance and getting pt more comfortable weightbearing through left leg. Pt was received sitting in bedside chair noted to have some minimal pain in the elbow and hip at rest. Pt was agreeable to participating in therapy. Pt performed sit to stand stransfer under min assist/CGA of 1. Pt then ambulated out into hallway for 35' also using min assist/CGA x1 before stopping for a standing rest break and then making the return trip to her room. During gait, patient was presenting with antalgic gait - noting 8/10 pain when stepping onto the left leg - as well as a short stride and slow gait pattern. Pt and spouse were given education on possibilities of returning to home vs short stint in acute care depending on what her goals where and how comfortable she felt. PT explained that she could benefit from acute rehab to continue progressing function and gait and eventually trying stairs, but if she felt comfortable and wanted to return home when discharged that was an option. Pt stated that she would feel more comfortable going to acute rehab and working toward gaining back function and getting \"the all clear\" from them before returning home. Pt would continue to benefit from skilled PT to address functional deficits, and PT recommendation of level 1 resources is still appropriate at this time   Goals   Patient Goals To get back to normal   STG Expiration Date 12/10/24   Plan   Treatment/Interventions ADL retraining;Functional transfer training;Endurance training;Gait training;Spoke to nursing   Progress Progressing " toward goals   PT Frequency 3-5x/wk   Discharge Recommendation   Rehab Resource Intensity Level, PT I (Maximum Resource Intensity)   AM-PAC Basic Mobility Inpatient   Turning in Flat Bed Without Bedrails 3   Lying on Back to Sitting on Edge of Flat Bed Without Bedrails 3   Moving Bed to Chair 3   Standing Up From Chair Using Arms 3   Walk in Room 3   Climb 3-5 Stairs With Railing 3   Basic Mobility Inpatient Raw Score 18   Basic Mobility Standardized Score 41.05   [facility] Highest Level Of Mobility   -HLM Goal 6: Walk 10 steps or more   -HLM Achieved 7: Walk 25 feet or more   End of Consult   Patient Position at End of Consult Bedside chair;All needs within reach     Mir Mcwilliams, SPT
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
## 2024-11-30 NOTE — PLAN OF CARE
Problem: PHYSICAL THERAPY ADULT  Goal: Performs mobility at highest level of function for planned discharge setting.  See evaluation for individualized goals.  Description: Treatment/Interventions: Functional transfer training, LE strengthening/ROM, Elevations, Therapeutic exercise, Endurance training, Patient/family training, Equipment eval/education, Bed mobility, Gait training, Continued evaluation, Spoke to nursing, OT  Equipment Recommended: Walker       See flowsheet documentation for full assessment, interventions and recommendations.  Outcome: Progressing  Note: Prognosis: Good  Problem List: Decreased strength, Decreased endurance, Impaired balance, Decreased mobility, Decreased coordination, Orthopedic restrictions, Pain  Assessment: Pt seen for treatment session today focused on assessing appropriateness for level 1 rehab and improving activity tolerance and getting pt more comfortable weightbearing through left leg. Pt was received sitting in bedside chair noted to have some minimal pain in the elbow and hip at rest. Pt was agreeable to participating in therapy. Pt performed sit to stand stransfer under min assist/CGA of 1. Pt then ambulated out into hallway for 35' also using min assist/CGA x1 before stopping for a standing rest break and then making the return trip to her room. During gait, patient was presenting with antalgic gait - noting 8/10 pain when stepping onto the left leg - as well as a short stride and slow gait pattern. Pt and spouse were given education on possibilities of returning to home vs short stint in acute care depending on what her goals where and how comfortable she felt. PT explained that she could benefit from acute rehab to continue progressing function and gait and eventually trying stairs, but if she felt comfortable and wanted to return home when discharged that was an option. Pt stated that she would feel more comfortable going to acute rehab and working toward gaining  "back function and getting \"the all clear\" from them before returning home. Pt would continue to benefit from skilled PT to address functional deficits, and PT recommendation of level 1 resources is still appropriate at this time        Rehab Resource Intensity Level, PT: I (Maximum Resource Intensity)    See flowsheet documentation for full assessment.        "
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
## 2024-11-30 NOTE — PLAN OF CARE
Problem: Potential for Falls  Goal: Patient will remain free of falls  Description: INTERVENTIONS:  - Educate patient/family on patient safety including physical limitations  - Instruct patient to call for assistance with activity   - Consult OT/PT to assist with strengthening/mobility   - Keep Call bell within reach  - Keep bed low and locked with side rails adjusted as appropriate  - Keep care items and personal belongings within reach  - Initiate and maintain comfort rounds  - Make Fall Risk Sign visible to staff  - Offer Toileting every 1 Hours, in advance of need  - Initiate/Maintain alarm  - Obtain necessary fall risk management equipment  - Apply yellow socks and bracelet for high fall risk patients  - Consider moving patient to room near nurses station  Outcome: Progressing     Problem: PAIN - ADULT  Goal: Verbalizes/displays adequate comfort level or baseline comfort level  Description: Interventions:  - Encourage patient to monitor pain and request assistance  - Assess pain using appropriate pain scale  - Administer analgesics based on type and severity of pain and evaluate response  - Implement non-pharmacological measures as appropriate and evaluate response  - Consider cultural and social influences on pain and pain management  - Notify physician/advanced practitioner if interventions unsuccessful or patient reports new pain  Outcome: Progressing     Problem: INFECTION - ADULT  Goal: Absence or prevention of progression during hospitalization  Description: INTERVENTIONS:  - Assess and monitor for signs and symptoms of infection  - Monitor lab/diagnostic results  - Monitor all insertion sites, i.e. indwelling lines, tubes, and drains  - Monitor endotracheal if appropriate and nasal secretions for changes in amount and color  - [place] appropriate cooling/warming therapies per order  - Administer medications as ordered  - Instruct and encourage patient and family to use good hand hygiene technique  -  Identify and instruct in appropriate isolation precautions for identified infection/condition  Outcome: Progressing     Problem: SAFETY ADULT  Goal: Patient will remain free of falls  Description: INTERVENTIONS:  - Educate patient/family on patient safety including physical limitations  - Instruct patient to call for assistance with activity   - Consult OT/PT to assist with strengthening/mobility   - Keep Call bell within reach  - Keep bed low and locked with side rails adjusted as appropriate  - Keep care items and personal belongings within reach  - Initiate and maintain comfort rounds  - Make Fall Risk Sign visible to staff  - Offer Toileting every 1 Hours, in advance of need  - Initiate/Maintain alarm  - Obtain necessary fall risk management equipment  - Apply yellow socks and bracelet for high fall risk patients  - Consider moving patient to room near nurses station  Outcome: Progressing  Goal: Maintain or return to baseline ADL function  Description: INTERVENTIONS:  -  Assess patient's ability to carry out ADLs; assess patient's baseline for ADL function and identify physical deficits which impact ability to perform ADLs (bathing, care of mouth/teeth, toileting, grooming, dressing, etc.)  - Assess/evaluate cause of self-care deficits   - Assess range of motion  - Assess patient's mobility; develop plan if impaired  - Assess patient's need for assistive devices and provide as appropriate  - Encourage maximum independence but intervene and supervise when necessary  - Involve family in performance of ADLs  - Assess for home care needs following discharge   - Consider OT consult to assist with ADL evaluation and planning for discharge  - Provide patient education as appropriate  Outcome: Progressing  Goal: Maintains/Returns to pre admission functional level  Description: INTERVENTIONS:  - Perform AM-PAC 6 Click Basic Mobility/ Daily Activity assessment daily.  - Set and communicate daily mobility goal to care team  and patient/family/caregiver.   - Collaborate with rehabilitation services on mobility goals if consulted  - Perform Range of Motion 3 times a day.  - Reposition patient every 2 hours.  - Dangle patient 3 times a day  - Stand patient 3 times a day  - Ambulate patient 3 times a day  - Out of bed to chair 3 times a day   - Out of bed for meals 3 times a day  - Out of bed for toileting  - Record patient progress and toleration of activity level   Outcome: Progressing     Problem: DISCHARGE PLANNING  Goal: Discharge to home or other facility with appropriate resources  Description: INTERVENTIONS:  - Identify barriers to discharge w/patient and caregiver  - Arrange for needed discharge resources and transportation as appropriate  - Identify discharge learning needs (meds, wound care, etc.)  - Arrange for interpretive services to assist at discharge as needed  - Refer to Case Management Department for coordinating discharge planning if the patient needs post-hospital services based on physician/advanced practitioner order or complex needs related to functional status, cognitive ability, or social support system  Outcome: Progressing     Problem: Knowledge Deficit  Goal: Patient/family/caregiver demonstrates understanding of disease process, treatment plan, medications, and discharge instructions  Description: Complete learning assessment and assess knowledge base.  Interventions:  - Provide teaching at level of understanding  - Provide teaching via preferred learning methods  Outcome: Progressing     Problem: SKIN/TISSUE INTEGRITY - ADULT  Goal: Incision(s), wounds(s) or drain site(s) healing without S/S of infection  Description: INTERVENTIONS  - Assess and document dressing, incision, wound bed, drain sites and surrounding tissue  - Provide patient and family education  - Perform skin care/dressing changes as ordered  Outcome: Progressing     Problem: MUSCULOSKELETAL - ADULT  Goal: Maintain or return mobility to safest  level of function  Description: INTERVENTIONS:  - Assess patient's ability to carry out ADLs; assess patient's baseline for ADL function and identify physical deficits which impact ability to perform ADLs (bathing, care of mouth/teeth, toileting, grooming, dressing, etc.)  - Assess/evaluate cause of self-care deficits   - Assess range of motion  - Assess patient's mobility  - Assess patient's need for assistive devices and provide as appropriate  - Encourage maximum independence but intervene and supervise when necessary  - Involve family in performance of ADLs  - Assess for home care needs following discharge   - Consider OT consult to assist with ADL evaluation and planning for discharge  - Provide patient education as appropriate  Outcome: Progressing  Goal: Maintain proper alignment of affected body part  Description: INTERVENTIONS:  - Support, maintain and protect limb and body alignment  - Provide patient/ family with appropriate education  Outcome: Progressing

## 2024-12-01 PROCEDURE — 97535 SELF CARE MNGMENT TRAINING: CPT

## 2024-12-01 PROCEDURE — 99232 SBSQ HOSP IP/OBS MODERATE 35: CPT | Performed by: SURGERY

## 2024-12-01 RX ADMIN — ENOXAPARIN SODIUM 30 MG: 30 INJECTION SUBCUTANEOUS at 21:25

## 2024-12-01 RX ADMIN — METHOCARBAMOL 500 MG: 500 TABLET ORAL at 17:25

## 2024-12-01 RX ADMIN — METHOCARBAMOL 500 MG: 500 TABLET ORAL at 00:58

## 2024-12-01 RX ADMIN — GABAPENTIN 100 MG: 100 CAPSULE ORAL at 00:58

## 2024-12-01 RX ADMIN — ACETAMINOPHEN 650 MG: 325 TABLET, FILM COATED ORAL at 05:30

## 2024-12-01 RX ADMIN — SENNOSIDES 17.2 MG: 8.6 TABLET, FILM COATED ORAL at 09:03

## 2024-12-01 RX ADMIN — DOCUSATE SODIUM 100 MG: 100 CAPSULE, LIQUID FILLED ORAL at 17:25

## 2024-12-01 RX ADMIN — METHOCARBAMOL 500 MG: 500 TABLET ORAL at 12:18

## 2024-12-01 RX ADMIN — ACETAMINOPHEN 650 MG: 325 TABLET, FILM COATED ORAL at 17:25

## 2024-12-01 RX ADMIN — DOCUSATE SODIUM 100 MG: 100 CAPSULE, LIQUID FILLED ORAL at 09:03

## 2024-12-01 RX ADMIN — PANTOPRAZOLE SODIUM 40 MG: 40 TABLET, DELAYED RELEASE ORAL at 05:30

## 2024-12-01 RX ADMIN — ENOXAPARIN SODIUM 30 MG: 30 INJECTION SUBCUTANEOUS at 09:03

## 2024-12-01 RX ADMIN — Medication 3 MG: at 21:25

## 2024-12-01 RX ADMIN — GABAPENTIN 100 MG: 100 CAPSULE ORAL at 09:03

## 2024-12-01 RX ADMIN — GABAPENTIN 100 MG: 100 CAPSULE ORAL at 17:25

## 2024-12-01 RX ADMIN — ACETAMINOPHEN 650 MG: 325 TABLET, FILM COATED ORAL at 00:58

## 2024-12-01 RX ADMIN — ACETAMINOPHEN 650 MG: 325 TABLET, FILM COATED ORAL at 12:18

## 2024-12-01 RX ADMIN — METHOCARBAMOL 500 MG: 500 TABLET ORAL at 05:30

## 2024-12-01 NOTE — PLAN OF CARE
Problem: Potential for Falls  Goal: Patient will remain free of falls  Description: INTERVENTIONS:  - Educate patient/family on patient safety including physical limitations  - Instruct patient to call for assistance with activity   - Consult OT/PT to assist with strengthening/mobility   - Keep Call bell within reach  - Keep bed low and locked with side rails adjusted as appropriate  - Keep care items and personal belongings within reach  - Initiate and maintain comfort rounds  - Make Fall Risk Sign visible to staff  - Offer Toileting every 1 Hours, in advance of need  - Initiate/Maintain alarm  - Obtain necessary fall risk management equipment  - Apply yellow socks and bracelet for high fall risk patients  - Consider moving patient to room near nurses station  Outcome: Progressing     Problem: PAIN - ADULT  Goal: Verbalizes/displays adequate comfort level or baseline comfort level  Description: Interventions:  - Encourage patient to monitor pain and request assistance  - Assess pain using appropriate pain scale  - Administer analgesics based on type and severity of pain and evaluate response  - Implement non-pharmacological measures as appropriate and evaluate response  - Consider cultural and social influences on pain and pain management  - Notify physician/advanced practitioner if interventions unsuccessful or patient reports new pain  Outcome: Progressing     Problem: INFECTION - ADULT  Goal: Absence or prevention of progression during hospitalization  Description: INTERVENTIONS:  - Assess and monitor for signs and symptoms of infection  - Monitor lab/diagnostic results  - Monitor all insertion sites, i.e. indwelling lines, tubes, and drains  - Monitor endotracheal if appropriate and nasal secretions for changes in amount and color  - [place] appropriate cooling/warming therapies per order  - Administer medications as ordered  - Instruct and encourage patient and family to use good hand hygiene technique  -  Identify and instruct in appropriate isolation precautions for identified infection/condition  Outcome: Progressing     Problem: SAFETY ADULT  Goal: Patient will remain free of falls  Description: INTERVENTIONS:  - Educate patient/family on patient safety including physical limitations  - Instruct patient to call for assistance with activity   - Consult OT/PT to assist with strengthening/mobility   - Keep Call bell within reach  - Keep bed low and locked with side rails adjusted as appropriate  - Keep care items and personal belongings within reach  - Initiate and maintain comfort rounds  - Make Fall Risk Sign visible to staff  - Offer Toileting every 1 Hours, in advance of need  - Initiate/Maintain alarm  - Obtain necessary fall risk management equipment  - Apply yellow socks and bracelet for high fall risk patients  - Consider moving patient to room near nurses station  Outcome: Progressing  Goal: Maintain or return to baseline ADL function  Description: INTERVENTIONS:  -  Assess patient's ability to carry out ADLs; assess patient's baseline for ADL function and identify physical deficits which impact ability to perform ADLs (bathing, care of mouth/teeth, toileting, grooming, dressing, etc.)  - Assess/evaluate cause of self-care deficits   - Assess range of motion  - Assess patient's mobility; develop plan if impaired  - Assess patient's need for assistive devices and provide as appropriate  - Encourage maximum independence but intervene and supervise when necessary  - Involve family in performance of ADLs  - Assess for home care needs following discharge   - Consider OT consult to assist with ADL evaluation and planning for discharge  - Provide patient education as appropriate  Outcome: Progressing  Goal: Maintains/Returns to pre admission functional level  Description: INTERVENTIONS:  - Perform AM-PAC 6 Click Basic Mobility/ Daily Activity assessment daily.  - Set and communicate daily mobility goal to care team  and patient/family/caregiver.   - Collaborate with rehabilitation services on mobility goals if consulted  - Perform Range of Motion 3 times a day.  - Reposition patient every 2 hours.  - Dangle patient 3 times a day  - Stand patient 3 times a day  - Ambulate patient 3 times a day  - Out of bed to chair 3 times a day   - Out of bed for meals 3 times a day  - Out of bed for toileting  - Record patient progress and toleration of activity level   Outcome: Progressing     Problem: DISCHARGE PLANNING  Goal: Discharge to home or other facility with appropriate resources  Description: INTERVENTIONS:  - Identify barriers to discharge w/patient and caregiver  - Arrange for needed discharge resources and transportation as appropriate  - Identify discharge learning needs (meds, wound care, etc.)  - Arrange for interpretive services to assist at discharge as needed  - Refer to Case Management Department for coordinating discharge planning if the patient needs post-hospital services based on physician/advanced practitioner order or complex needs related to functional status, cognitive ability, or social support system  Outcome: Progressing     Problem: Knowledge Deficit  Goal: Patient/family/caregiver demonstrates understanding of disease process, treatment plan, medications, and discharge instructions  Description: Complete learning assessment and assess knowledge base.  Interventions:  - Provide teaching at level of understanding  - Provide teaching via preferred learning methods  Outcome: Progressing     Problem: SKIN/TISSUE INTEGRITY - ADULT  Goal: Incision(s), wounds(s) or drain site(s) healing without S/S of infection  Description: INTERVENTIONS  - Assess and document dressing, incision, wound bed, drain sites and surrounding tissue  - Provide patient and family education  - Perform skin care/dressing changes as ordered  Outcome: Progressing     Problem: MUSCULOSKELETAL - ADULT  Goal: Maintain or return mobility to safest  level of function  Description: INTERVENTIONS:  - Assess patient's ability to carry out ADLs; assess patient's baseline for ADL function and identify physical deficits which impact ability to perform ADLs (bathing, care of mouth/teeth, toileting, grooming, dressing, etc.)  - Assess/evaluate cause of self-care deficits   - Assess range of motion  - Assess patient's mobility  - Assess patient's need for assistive devices and provide as appropriate  - Encourage maximum independence but intervene and supervise when necessary  - Involve family in performance of ADLs  - Assess for home care needs following discharge   - Consider OT consult to assist with ADL evaluation and planning for discharge  - Provide patient education as appropriate  Outcome: Progressing  Goal: Maintain proper alignment of affected body part  Description: INTERVENTIONS:  - Support, maintain and protect limb and body alignment  - Provide patient/ family with appropriate education  Outcome: Progressing

## 2024-12-01 NOTE — PLAN OF CARE
Problem: OCCUPATIONAL THERAPY ADULT  Goal: Performs self-care activities at highest level of function for planned discharge setting.  See evaluation for individualized goals.  Description: Treatment Interventions: ADL retraining, Functional transfer training, Endurance training, Patient/family training, Equipment evaluation/education, Compensatory technique education, Energy conservation, Activityengagement          See flowsheet documentation for full assessment, interventions and recommendations.   Outcome: Progressing  Note: Limitation: Decreased ADL status, Decreased Safe judgement during ADL, Decreased endurance, Decreased self-care trans, Decreased high-level ADLs  Prognosis: Good  Assessment: Pt seen for Occupational Therapy session with focus on activity tolerance, bed mob,  LB self-care tasks with use of long handle adaptive equipment and functional transfers to bedside chair and functional mob.. Pt cleared by RN/ Khadra for pt participated in OT session. Pt presented supine/HOB raised pt awake/alert and agreeable to participate in therapy following pt identifiers confirmed. Pt did not report a therapy goal this session however pt was pleasant and cooperative with all therapy requests. In comparison to pt previous session pt improvement noted for pt pt functional transfers/mob with Hand Held assist.Pt required assist for donning socks  2* pt unable to manage task one-handed utilizing a socks aid at this time  Pt remains appropriate for I ([employer]) Pt set up to bedside chair post session, chair alarm activated and all needs within pt reach     Rehab Resource Intensity Level, OT: I ([employer] Intensity)

## 2024-12-01 NOTE — PLAN OF CARE
Problem: Potential for Falls  Goal: Patient will remain free of falls  Description: INTERVENTIONS:  - Educate patient/family on patient safety including physical limitations  - Instruct patient to call for assistance with activity   - Consult OT/PT to assist with strengthening/mobility   - Keep Call bell within reach  - Keep bed low and locked with side rails adjusted as appropriate  - Keep care items and personal belongings within reach  - Initiate and maintain comfort rounds  - Make Fall Risk Sign visible to staff  - Offer Toileting every 1 Hours, in advance of need  - Initiate/Maintain alarm  - Obtain necessary fall risk management equipment  - Apply yellow socks and bracelet for high fall risk patients  - Consider moving patient to room near nurses station  Outcome: Progressing     Problem: PAIN - ADULT  Goal: Verbalizes/displays adequate comfort level or baseline comfort level  Description: Interventions:  - Encourage patient to monitor pain and request assistance  - Assess pain using appropriate pain scale  - Administer analgesics based on type and severity of pain and evaluate response  - Implement non-pharmacological measures as appropriate and evaluate response  - Consider cultural and social influences on pain and pain management  - Notify physician/advanced practitioner if interventions unsuccessful or patient reports new pain  Outcome: Progressing     Problem: INFECTION - ADULT  Goal: Absence or prevention of progression during hospitalization  Description: INTERVENTIONS:  - Assess and monitor for signs and symptoms of infection  - Monitor lab/diagnostic results  - Monitor all insertion sites, i.e. indwelling lines, tubes, and drains  - Monitor endotracheal if appropriate and nasal secretions for changes in amount and color  - [place] appropriate cooling/warming therapies per order  - Administer medications as ordered  - Instruct and encourage patient and family to use good hand hygiene technique  -  Identify and instruct in appropriate isolation precautions for identified infection/condition  Outcome: Progressing     Problem: SAFETY ADULT  Goal: Patient will remain free of falls  Description: INTERVENTIONS:  - Educate patient/family on patient safety including physical limitations  - Instruct patient to call for assistance with activity   - Consult OT/PT to assist with strengthening/mobility   - Keep Call bell within reach  - Keep bed low and locked with side rails adjusted as appropriate  - Keep care items and personal belongings within reach  - Initiate and maintain comfort rounds  - Make Fall Risk Sign visible to staff  - Offer Toileting every 1 Hours, in advance of need  - Initiate/Maintain alarm  - Obtain necessary fall risk management equipment  - Apply yellow socks and bracelet for high fall risk patients  - Consider moving patient to room near nurses station  Outcome: Progressing  Goal: Maintain or return to baseline ADL function  Description: INTERVENTIONS:  -  Assess patient's ability to carry out ADLs; assess patient's baseline for ADL function and identify physical deficits which impact ability to perform ADLs (bathing, care of mouth/teeth, toileting, grooming, dressing, etc.)  - Assess/evaluate cause of self-care deficits   - Assess range of motion  - Assess patient's mobility; develop plan if impaired  - Assess patient's need for assistive devices and provide as appropriate  - Encourage maximum independence but intervene and supervise when necessary  - Involve family in performance of ADLs  - Assess for home care needs following discharge   - Consider OT consult to assist with ADL evaluation and planning for discharge  - Provide patient education as appropriate  Outcome: Progressing  Goal: Maintains/Returns to pre admission functional level  Description: INTERVENTIONS:  - Perform AM-PAC 6 Click Basic Mobility/ Daily Activity assessment daily.  - Set and communicate daily mobility goal to care team  and patient/family/caregiver.   - Collaborate with rehabilitation services on mobility goals if consulted  - Perform Range of Motion 3 times a day.  - Reposition patient every 2 hours.  - Dangle patient 3 times a day  - Stand patient 3 times a day  - Ambulate patient 3 times a day  - Out of bed to chair 3 times a day   - Out of bed for meals 3 times a day  - Out of bed for toileting  - Record patient progress and toleration of activity level   Outcome: Progressing     Problem: DISCHARGE PLANNING  Goal: Discharge to home or other facility with appropriate resources  Description: INTERVENTIONS:  - Identify barriers to discharge w/patient and caregiver  - Arrange for needed discharge resources and transportation as appropriate  - Identify discharge learning needs (meds, wound care, etc.)  - Arrange for interpretive services to assist at discharge as needed  - Refer to Case Management Department for coordinating discharge planning if the patient needs post-hospital services based on physician/advanced practitioner order or complex needs related to functional status, cognitive ability, or social support system  Outcome: Progressing     Problem: Knowledge Deficit  Goal: Patient/family/caregiver demonstrates understanding of disease process, treatment plan, medications, and discharge instructions  Description: Complete learning assessment and assess knowledge base.  Interventions:  - Provide teaching at level of understanding  - Provide teaching via preferred learning methods  Outcome: Progressing     Problem: SKIN/TISSUE INTEGRITY - ADULT  Goal: Incision(s), wounds(s) or drain site(s) healing without S/S of infection  Description: INTERVENTIONS  - Assess and document dressing, incision, wound bed, drain sites and surrounding tissue  - Provide patient and family education  - Perform skin care/dressing changes as ordered  Outcome: Progressing     Problem: MUSCULOSKELETAL - ADULT  Goal: Maintain or return mobility to safest  level of function  Description: INTERVENTIONS:  - Assess patient's ability to carry out ADLs; assess patient's baseline for ADL function and identify physical deficits which impact ability to perform ADLs (bathing, care of mouth/teeth, toileting, grooming, dressing, etc.)  - Assess/evaluate cause of self-care deficits   - Assess range of motion  - Assess patient's mobility  - Assess patient's need for assistive devices and provide as appropriate  - Encourage maximum independence but intervene and supervise when necessary  - Involve family in performance of ADLs  - Assess for home care needs following discharge   - Consider OT consult to assist with ADL evaluation and planning for discharge  - Provide patient education as appropriate  Outcome: Progressing  Goal: Maintain proper alignment of affected body part  Description: INTERVENTIONS:  - Support, maintain and protect limb and body alignment  - Provide patient/ family with appropriate education  Outcome: Progressing
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
## 2024-12-02 PROCEDURE — 97116 GAIT TRAINING THERAPY: CPT

## 2024-12-02 PROCEDURE — 99232 SBSQ HOSP IP/OBS MODERATE 35: CPT | Performed by: EMERGENCY MEDICINE

## 2024-12-02 PROCEDURE — 97530 THERAPEUTIC ACTIVITIES: CPT

## 2024-12-02 RX ADMIN — METHOCARBAMOL 500 MG: 500 TABLET ORAL at 06:19

## 2024-12-02 RX ADMIN — ACETAMINOPHEN 650 MG: 325 TABLET, FILM COATED ORAL at 06:19

## 2024-12-02 RX ADMIN — ENOXAPARIN SODIUM 30 MG: 30 INJECTION SUBCUTANEOUS at 21:44

## 2024-12-02 RX ADMIN — Medication 3 MG: at 21:44

## 2024-12-02 RX ADMIN — ACETAMINOPHEN 650 MG: 325 TABLET, FILM COATED ORAL at 17:22

## 2024-12-02 RX ADMIN — PANTOPRAZOLE SODIUM 40 MG: 40 TABLET, DELAYED RELEASE ORAL at 06:19

## 2024-12-02 RX ADMIN — METHOCARBAMOL 500 MG: 500 TABLET ORAL at 01:36

## 2024-12-02 RX ADMIN — GABAPENTIN 100 MG: 100 CAPSULE ORAL at 08:39

## 2024-12-02 RX ADMIN — ENOXAPARIN SODIUM 30 MG: 30 INJECTION SUBCUTANEOUS at 08:31

## 2024-12-02 RX ADMIN — DOCUSATE SODIUM 100 MG: 100 CAPSULE, LIQUID FILLED ORAL at 08:30

## 2024-12-02 RX ADMIN — METHOCARBAMOL 500 MG: 500 TABLET ORAL at 17:22

## 2024-12-02 RX ADMIN — SENNOSIDES 17.2 MG: 8.6 TABLET, FILM COATED ORAL at 08:30

## 2024-12-02 RX ADMIN — ACETAMINOPHEN 650 MG: 325 TABLET, FILM COATED ORAL at 12:42

## 2024-12-02 RX ADMIN — DOCUSATE SODIUM 100 MG: 100 CAPSULE, LIQUID FILLED ORAL at 17:23

## 2024-12-02 RX ADMIN — ACETAMINOPHEN 650 MG: 325 TABLET, FILM COATED ORAL at 01:36

## 2024-12-02 RX ADMIN — METHOCARBAMOL 500 MG: 500 TABLET ORAL at 12:42

## 2024-12-02 RX ADMIN — GABAPENTIN 100 MG: 100 CAPSULE ORAL at 17:22

## 2024-12-02 RX ADMIN — GABAPENTIN 100 MG: 100 CAPSULE ORAL at 01:36

## 2024-12-02 NOTE — CASE MANAGEMENT
Per [employer], Acute Rehab auth still pending. Pending Reference #: [id number].    [facility].     CM notified: Dwayne Berkowitz

## 2024-12-02 NOTE — PLAN OF CARE
Problem: Potential for Falls  Goal: Patient will remain free of falls  Description: INTERVENTIONS:  - Educate patient/family on patient safety including physical limitations  - Instruct patient to call for assistance with activity   - Consult OT/PT to assist with strengthening/mobility   - Keep Call bell within reach  - Keep bed low and locked with side rails adjusted as appropriate  - Keep care items and personal belongings within reach  - Initiate and maintain comfort rounds  - Make Fall Risk Sign visible to staff  - Offer Toileting every 1 Hours, in advance of need  - Initiate/Maintain alarm  - Obtain necessary fall risk management equipment  - Apply yellow socks and bracelet for high fall risk patients  - Consider moving patient to room near nurses station  Outcome: Progressing     Problem: PAIN - ADULT  Goal: Verbalizes/displays adequate comfort level or baseline comfort level  Description: Interventions:  - Encourage patient to monitor pain and request assistance  - Assess pain using appropriate pain scale  - Administer analgesics based on type and severity of pain and evaluate response  - Implement non-pharmacological measures as appropriate and evaluate response  - Consider cultural and social influences on pain and pain management  - Notify physician/advanced practitioner if interventions unsuccessful or patient reports new pain  Outcome: Progressing     Problem: INFECTION - ADULT  Goal: Absence or prevention of progression during hospitalization  Description: INTERVENTIONS:  - Assess and monitor for signs and symptoms of infection  - Monitor lab/diagnostic results  - Monitor all insertion sites, i.e. indwelling lines, tubes, and drains  - Monitor endotracheal if appropriate and nasal secretions for changes in amount and color  - Chambers appropriate cooling/warming therapies per order  - Administer medications as ordered  - Instruct and encourage patient and family to use good hand hygiene technique  -  Identify and instruct in appropriate isolation precautions for identified infection/condition  Outcome: Progressing     Problem: SAFETY ADULT  Goal: Patient will remain free of falls  Description: INTERVENTIONS:  - Educate patient/family on patient safety including physical limitations  - Instruct patient to call for assistance with activity   - Consult OT/PT to assist with strengthening/mobility   - Keep Call bell within reach  - Keep bed low and locked with side rails adjusted as appropriate  - Keep care items and personal belongings within reach  - Initiate and maintain comfort rounds  - Make Fall Risk Sign visible to staff  - Offer Toileting every 1 Hours, in advance of need  - Initiate/Maintain alarm  - Obtain necessary fall risk management equipment  - Apply yellow socks and bracelet for high fall risk patients  - Consider moving patient to room near nurses station  Outcome: Progressing  Goal: Maintain or return to baseline ADL function  Description: INTERVENTIONS:  -  Assess patient's ability to carry out ADLs; assess patient's baseline for ADL function and identify physical deficits which impact ability to perform ADLs (bathing, care of mouth/teeth, toileting, grooming, dressing, etc.)  - Assess/evaluate cause of self-care deficits   - Assess range of motion  - Assess patient's mobility; develop plan if impaired  - Assess patient's need for assistive devices and provide as appropriate  - Encourage maximum independence but intervene and supervise when necessary  - Involve family in performance of ADLs  - Assess for home care needs following discharge   - Consider OT consult to assist with ADL evaluation and planning for discharge  - Provide patient education as appropriate  Outcome: Progressing  Goal: Maintains/Returns to pre admission functional level  Description: INTERVENTIONS:  - Perform AM-PAC 6 Click Basic Mobility/ Daily Activity assessment daily.  - Set and communicate daily mobility goal to care team  and patient/family/caregiver.   - Collaborate with rehabilitation services on mobility goals if consulted  - Perform Range of Motion 3 times a day.  - Reposition patient every 2 hours.  - Dangle patient 3 times a day  - Stand patient 3 times a day  - Ambulate patient 3 times a day  - Out of bed to chair 3 times a day   - Out of bed for meals 3 times a day  - Out of bed for toileting  - Record patient progress and toleration of activity level   Outcome: Progressing     Problem: DISCHARGE PLANNING  Goal: Discharge to home or other facility with appropriate resources  Description: INTERVENTIONS:  - Identify barriers to discharge w/patient and caregiver  - Arrange for needed discharge resources and transportation as appropriate  - Identify discharge learning needs (meds, wound care, etc.)  - Arrange for interpretive services to assist at discharge as needed  - Refer to Case Management Department for coordinating discharge planning if the patient needs post-hospital services based on physician/advanced practitioner order or complex needs related to functional status, cognitive ability, or social support system  Outcome: Progressing     Problem: Knowledge Deficit  Goal: Patient/family/caregiver demonstrates understanding of disease process, treatment plan, medications, and discharge instructions  Description: Complete learning assessment and assess knowledge base.  Interventions:  - Provide teaching at level of understanding  - Provide teaching via preferred learning methods  Outcome: Progressing     Problem: SKIN/TISSUE INTEGRITY - ADULT  Goal: Incision(s), wounds(s) or drain site(s) healing without S/S of infection  Description: INTERVENTIONS  - Assess and document dressing, incision, wound bed, drain sites and surrounding tissue  - Provide patient and family education  - Perform skin care/dressing changes as ordered  Outcome: Progressing     Problem: MUSCULOSKELETAL - ADULT  Goal: Maintain or return mobility to safest  level of function  Description: INTERVENTIONS:  - Assess patient's ability to carry out ADLs; assess patient's baseline for ADL function and identify physical deficits which impact ability to perform ADLs (bathing, care of mouth/teeth, toileting, grooming, dressing, etc.)  - Assess/evaluate cause of self-care deficits   - Assess range of motion  - Assess patient's mobility  - Assess patient's need for assistive devices and provide as appropriate  - Encourage maximum independence but intervene and supervise when necessary  - Involve family in performance of ADLs  - Assess for home care needs following discharge   - Consider OT consult to assist with ADL evaluation and planning for discharge  - Provide patient education as appropriate  Outcome: Progressing  Goal: Maintain proper alignment of affected body part  Description: INTERVENTIONS:  - Support, maintain and protect limb and body alignment  - Provide patient/ family with appropriate education  Outcome: Progressing
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
## 2024-12-02 NOTE — RESTORATIVE TECHNICIAN NOTE
Restorative Technician Note      Patient Name: Analia Evans     Restorative Tech Visit Date: 12/02/24  Note Type: Mobility  Patient Position Upon Consult: Supine  Activity Performed: Ambulated  Assistive Device: Other (Comment) (HHA x 1)  Patient Position at End of Consult: Bedside chair; All needs within reach    ANNA Liu

## 2024-12-02 NOTE — PLAN OF CARE
Problem: PHYSICAL THERAPY ADULT  Goal: Performs mobility at highest level of function for planned discharge setting.  See evaluation for individualized goals.  Description: Treatment/Interventions: Functional transfer training, LE strengthening/ROM, Elevations, Therapeutic exercise, Endurance training, Patient/family training, Equipment eval/education, Bed mobility, Gait training, Continued evaluation, Spoke to nursing, OT  Equipment Recommended: Walker       See flowsheet documentation for full assessment, interventions and recommendations.  Outcome: Progressing  Note: Prognosis: Good  Problem List: Decreased strength, Decreased endurance, Impaired balance, Decreased mobility, Decreased coordination, Orthopedic restrictions, Pain  Assessment: pt continues to demonstrate considerable improvement in all aspects of mobility this session, perfomring all transfers without assist by end of treatment, and ambulating with unilateral AD & no need for physical assist across household & community distances without need for setaed rests.  She safely manages ADs & maintains slow, yet safe pace throughout without LOB.  She was instructed in & negotiated steps as noted above without incident, primarily requiring assist for contact guarding as would be expected wtih any pt with similar injuries at this point in recovery.  Extensive time taken for education with pt & spouse in regards to hip precautions, importance of mobility, expected recovery, and role of PT as part of that recovery.  time taken to discuss progress made to this point, and expectation that she will continue to progress at rapid pace given demonstrated recovery & premorbid active lifestyle.  From PT perspective, pt will likely progress to home d/c with follow up PT services pending LOS in acute hospital setting.  Pt will likely benefit from ongoing OT interventions to train pt in single handed activities while maintaining hip precations in effort to maximize  independence upon return to community.  Discussed PT recommendatiosn with Meena Winters DPT who is in agreement to potentially chaning d/c recommendations.        Rehab Resource Intensity Level, PT: III (Minimum Resource Intensity)    See flowsheet documentation for full assessment.

## 2024-12-02 NOTE — PHYSICAL THERAPY NOTE
Physical Therapy Progress Note     12/02/24 1559   PT Last Visit   PT Visit Date 12/02/24   Note Type   Note Type Treatment   Pain Assessment   Pain Score No Pain   Restrictions/Precautions   LUE Weight Bearing Per Order NWB   LLE Weight Bearing Per Order WBAT   Braces or Orthoses Sling   Other Precautions WBS;THR;Pain;Fall Risk   Subjective   Subjective Pt encountered supine in bed, pleasant and agreeable to treatment.  Reports improved pain control in recent days without need for opiod pain meds today.  Has been ambualting in hallway with spouse/staff assist outside of PT.  No change in status with activity today.   present & observed all mobiilty throughout session.   Bed Mobility   Supine to Sit 5  Supervision   Additional items Assist x 1   Transfers   Sit to Stand 5  Supervision   Additional items Assist x 1   Stand to Sit 4  Minimal assistance   Additional items Assist x 1  (instructions for UE support, then became supervision assist.)   Ambulation/Elevation   Gait pattern Short stride;Foward flexed;Inconsistent heidi;Decreased foot clearance;Decreased L stance;Narrow IFEOMA;Improper Weight shift;Antalgic   Gait Assistance 5  Supervision   Additional items Assist x 1   Assistive Device SPC;Large base quad cane   Distance 120', 80' WBQC, then  80' SPC   Stair Management Assistance 4  Minimal assist   Additional items Assist x 1   Stair Management Technique One rail R;Step to pattern;Foreward   Number of Stairs 7   Balance   Static Sitting Fair +   Static Standing Fair   Ambulatory Fair -   Activity Tolerance   Activity Tolerance Patient tolerated treatment well   Nurse Made Aware ADRIANO Gaviria   Assessment   Prognosis Good   Problem List Decreased strength;Decreased endurance;Impaired balance;Decreased mobility;Decreased coordination;Orthopedic restrictions;Pain   Assessment pt continues to demonstrate considerable improvement in all aspects of mobility this session, perfomring all transfers without assist  by end of treatment, and ambulating with unilateral AD & no need for physical assist across household & community distances without need for setaed rests.  She safely manages ADs & maintains slow, yet safe pace throughout without LOB.  She was instructed in & negotiated steps as noted above without incident, primarily requiring assist for contact guarding as would be expected wtih any pt with similar injuries at this point in recovery.  Extensive time taken for education with pt & spouse in regards to hip precautions, importance of mobility, expected recovery, and role of PT as part of that recovery.  time taken to discuss progress made to this point, and expectation that she will continue to progress at rapid pace given demonstrated recovery & premorbid active lifestyle.  From PT perspective, pt will likely progress to home d/c with follow up PT services pending LOS in acute hospital setting.  Pt will likely benefit from ongoing OT interventions to train pt in single handed activities while maintaining hip precations in effort to maximize independence upon return to community.  Discussed PT recommendatiosn with Meena Winters DPT who is in agreement to potentially chaning d/c recommendations.   Goals   Patient Goals to get better & go to their cabin   STG Expiration Date 12/10/24   PT Treatment Day 2   Plan   Treatment/Interventions Functional transfer training;LE strengthening/ROM;Elevations;Therapeutic exercise;Endurance training;Patient/family training;Equipment eval/education;Bed mobility;Gait training   Progress Progressing toward goals   PT Frequency 3-5x/wk   Discharge Recommendation   Rehab Resource Intensity Level, PT III (Minimum Resource Intensity)   Equipment Recommended Cane   AM-PAC Basic Mobility Inpatient   Turning in Flat Bed Without Bedrails 3   Lying on Back to Sitting on Edge of Flat Bed Without Bedrails 3   Moving Bed to Chair 3   Standing Up From Chair Using Arms 3   Walk in Room 3   Climb  3-5 Stairs With Railing 3   Basic Mobility Inpatient Raw Score 18   Basic Mobility Standardized Score 41.05   [facility] Highest Level Of Mobility   -HLM Goal 6: Walk 10 steps or more   -HLM Achieved 8: Walk 250 feet ot more       Shalom Hernandez PTA    An [facility] Basic Mobility Raw Score less than 17 suggests pt would benefit from post acute rehab.  Please also refer to the recommendation of the Physical Therapist for safe discharge planning.

## 2024-12-02 NOTE — PLAN OF CARE
Problem: Potential for Falls  Goal: Patient will remain free of falls  Description: INTERVENTIONS:  - Educate patient/family on patient safety including physical limitations  - Instruct patient to call for assistance with activity   - Consult OT/PT to assist with strengthening/mobility   - Keep Call bell within reach  - Keep bed low and locked with side rails adjusted as appropriate  - Keep care items and personal belongings within reach  - Initiate and maintain comfort rounds  - Make Fall Risk Sign visible to staff  - Offer Toileting every 1 Hours, in advance of need  - Initiate/Maintain alarm  - Obtain necessary fall risk management equipment  - Apply yellow socks and bracelet for high fall risk patients  - Consider moving patient to room near nurses station  Outcome: Progressing     Problem: PAIN - ADULT  Goal: Verbalizes/displays adequate comfort level or baseline comfort level  Description: Interventions:  - Encourage patient to monitor pain and request assistance  - Assess pain using appropriate pain scale  - Administer analgesics based on type and severity of pain and evaluate response  - Implement non-pharmacological measures as appropriate and evaluate response  - Consider cultural and social influences on pain and pain management  - Notify physician/advanced practitioner if interventions unsuccessful or patient reports new pain  Outcome: Progressing     Problem: INFECTION - ADULT  Goal: Absence or prevention of progression during hospitalization  Description: INTERVENTIONS:  - Assess and monitor for signs and symptoms of infection  - Monitor lab/diagnostic results  - Monitor all insertion sites, i.e. indwelling lines, tubes, and drains  - Monitor endotracheal if appropriate and nasal secretions for changes in amount and color  - [place] appropriate cooling/warming therapies per order  - Administer medications as ordered  - Instruct and encourage patient and family to use good hand hygiene technique  -  Identify and instruct in appropriate isolation precautions for identified infection/condition  Outcome: Progressing     Problem: SAFETY ADULT  Goal: Patient will remain free of falls  Description: INTERVENTIONS:  - Educate patient/family on patient safety including physical limitations  - Instruct patient to call for assistance with activity   - Consult OT/PT to assist with strengthening/mobility   - Keep Call bell within reach  - Keep bed low and locked with side rails adjusted as appropriate  - Keep care items and personal belongings within reach  - Initiate and maintain comfort rounds  - Make Fall Risk Sign visible to staff  - Offer Toileting every 1 Hours, in advance of need  - Initiate/Maintain alarm  - Obtain necessary fall risk management equipment  - Apply yellow socks and bracelet for high fall risk patients  - Consider moving patient to room near nurses station  Outcome: Progressing  Goal: Maintain or return to baseline ADL function  Description: INTERVENTIONS:  -  Assess patient's ability to carry out ADLs; assess patient's baseline for ADL function and identify physical deficits which impact ability to perform ADLs (bathing, care of mouth/teeth, toileting, grooming, dressing, etc.)  - Assess/evaluate cause of self-care deficits   - Assess range of motion  - Assess patient's mobility; develop plan if impaired  - Assess patient's need for assistive devices and provide as appropriate  - Encourage maximum independence but intervene and supervise when necessary  - Involve family in performance of ADLs  - Assess for home care needs following discharge   - Consider OT consult to assist with ADL evaluation and planning for discharge  - Provide patient education as appropriate  Outcome: Progressing  Goal: Maintains/Returns to pre admission functional level  Description: INTERVENTIONS:  - Perform AM-PAC 6 Click Basic Mobility/ Daily Activity assessment daily.  - Set and communicate daily mobility goal to care team  and patient/family/caregiver.   - Collaborate with rehabilitation services on mobility goals if consulted  - Perform Range of Motion 3 times a day.  - Reposition patient every 2 hours.  - Dangle patient 3 times a day  - Stand patient 3 times a day  - Ambulate patient 3 times a day  - Out of bed to chair 3 times a day   - Out of bed for meals 3 times a day  - Out of bed for toileting  - Record patient progress and toleration of activity level   Outcome: Progressing     Problem: DISCHARGE PLANNING  Goal: Discharge to home or other facility with appropriate resources  Description: INTERVENTIONS:  - Identify barriers to discharge w/patient and caregiver  - Arrange for needed discharge resources and transportation as appropriate  - Identify discharge learning needs (meds, wound care, etc.)  - Arrange for interpretive services to assist at discharge as needed  - Refer to Case Management Department for coordinating discharge planning if the patient needs post-hospital services based on physician/advanced practitioner order or complex needs related to functional status, cognitive ability, or social support system  Outcome: Progressing     Problem: Knowledge Deficit  Goal: Patient/family/caregiver demonstrates understanding of disease process, treatment plan, medications, and discharge instructions  Description: Complete learning assessment and assess knowledge base.  Interventions:  - Provide teaching at level of understanding  - Provide teaching via preferred learning methods  Outcome: Progressing     Problem: SKIN/TISSUE INTEGRITY - ADULT  Goal: Incision(s), wounds(s) or drain site(s) healing without S/S of infection  Description: INTERVENTIONS  - Assess and document dressing, incision, wound bed, drain sites and surrounding tissue  - Provide patient and family education  - Perform skin care/dressing changes as ordered  Outcome: Progressing     Problem: MUSCULOSKELETAL - ADULT  Goal: Maintain or return mobility to safest  level of function  Description: INTERVENTIONS:  - Assess patient's ability to carry out ADLs; assess patient's baseline for ADL function and identify physical deficits which impact ability to perform ADLs (bathing, care of mouth/teeth, toileting, grooming, dressing, etc.)  - Assess/evaluate cause of self-care deficits   - Assess range of motion  - Assess patient's mobility  - Assess patient's need for assistive devices and provide as appropriate  - Encourage maximum independence but intervene and supervise when necessary  - Involve family in performance of ADLs  - Assess for home care needs following discharge   - Consider OT consult to assist with ADL evaluation and planning for discharge  - Provide patient education as appropriate  Outcome: Progressing  Goal: Maintain proper alignment of affected body part  Description: INTERVENTIONS:  - Support, maintain and protect limb and body alignment  - Provide patient/ family with appropriate education  Outcome: Progressing

## 2024-12-03 ENCOUNTER — HOME HEALTH ADMISSION (OUTPATIENT)
Dept: HOME HEALTH SERVICES | Facility: HOME HEALTHCARE | Age: 79
End: 2024-12-03
Payer: COMMERCIAL

## 2024-12-03 VITALS
DIASTOLIC BLOOD PRESSURE: 72 MMHG | TEMPERATURE: 98.8 F | WEIGHT: 186.51 LBS | RESPIRATION RATE: 18 BRPM | HEIGHT: 68 IN | HEART RATE: 88 BPM | BODY MASS INDEX: 28.27 KG/M2 | OXYGEN SATURATION: 95 % | SYSTOLIC BLOOD PRESSURE: 119 MMHG

## 2024-12-03 PROCEDURE — 97535 SELF CARE MNGMENT TRAINING: CPT

## 2024-12-03 PROCEDURE — 99238 HOSP IP/OBS DSCHRG MGMT 30/<: CPT | Performed by: EMERGENCY MEDICINE

## 2024-12-03 PROCEDURE — NC001 PR NO CHARGE: Performed by: EMERGENCY MEDICINE

## 2024-12-03 RX ORDER — ENOXAPARIN SODIUM 100 MG/ML
40 INJECTION SUBCUTANEOUS DAILY
Qty: 16.8 ML | Refills: 0 | OUTPATIENT
Start: 2024-11-25 | End: 2024-12-23

## 2024-12-03 RX ORDER — ACETAMINOPHEN 325 MG/1
650 TABLET ORAL EVERY 6 HOURS PRN
Qty: 240 TABLET | Refills: 0 | Status: SHIPPED | OUTPATIENT
Start: 2024-12-03

## 2024-12-03 RX ORDER — METHOCARBAMOL 500 MG/1
500 TABLET, FILM COATED ORAL EVERY 6 HOURS PRN
Qty: 30 TABLET | Refills: 0 | Status: SHIPPED | OUTPATIENT
Start: 2024-12-03

## 2024-12-03 RX ORDER — OXYCODONE HYDROCHLORIDE 5 MG/1
5 TABLET ORAL SEE ADMIN INSTRUCTIONS
Qty: 6 TABLET | Refills: 0 | Status: SHIPPED | OUTPATIENT
Start: 2024-12-03 | End: 2024-12-13

## 2024-12-03 RX ORDER — ENOXAPARIN SODIUM 100 MG/ML
40 INJECTION SUBCUTANEOUS DAILY
Qty: 11.2 ML | Refills: 0 | Status: SHIPPED | OUTPATIENT
Start: 2024-12-03 | End: 2024-12-31

## 2024-12-03 RX ORDER — SENNA AND DOCUSATE SODIUM 50; 8.6 MG/1; MG/1
1 TABLET, FILM COATED ORAL DAILY
Qty: 30 TABLET | Refills: 0 | Status: SHIPPED | OUTPATIENT
Start: 2024-12-03 | End: 2024-12-09

## 2024-12-03 RX ADMIN — GABAPENTIN 100 MG: 100 CAPSULE ORAL at 00:46

## 2024-12-03 RX ADMIN — METHOCARBAMOL 500 MG: 500 TABLET ORAL at 00:44

## 2024-12-03 RX ADMIN — ENOXAPARIN SODIUM 30 MG: 30 INJECTION SUBCUTANEOUS at 08:35

## 2024-12-03 RX ADMIN — ACETAMINOPHEN 650 MG: 325 TABLET, FILM COATED ORAL at 00:43

## 2024-12-03 RX ADMIN — PANTOPRAZOLE SODIUM 40 MG: 40 TABLET, DELAYED RELEASE ORAL at 05:02

## 2024-12-03 RX ADMIN — Medication 2.5 MG: at 02:37

## 2024-12-03 RX ADMIN — GABAPENTIN 100 MG: 100 CAPSULE ORAL at 08:36

## 2024-12-03 RX ADMIN — DOCUSATE SODIUM 100 MG: 100 CAPSULE, LIQUID FILLED ORAL at 08:35

## 2024-12-03 RX ADMIN — ACETAMINOPHEN 650 MG: 325 TABLET, FILM COATED ORAL at 05:02

## 2024-12-03 RX ADMIN — SENNOSIDES 17.2 MG: 8.6 TABLET, FILM COATED ORAL at 08:35

## 2024-12-03 RX ADMIN — METHOCARBAMOL 500 MG: 500 TABLET ORAL at 05:02

## 2024-12-03 RX ADMIN — METHOCARBAMOL 500 MG: 500 TABLET ORAL at 11:21

## 2024-12-03 RX ADMIN — ACETAMINOPHEN 650 MG: 325 TABLET, FILM COATED ORAL at 11:20

## 2024-12-03 NOTE — PLAN OF CARE
Problem: OCCUPATIONAL THERAPY ADULT  Goal: Performs self-care activities at highest level of function for planned discharge setting.  See evaluation for individualized goals.  Description: Treatment Interventions: ADL retraining, Functional transfer training, Endurance training, Patient/family training, Equipment evaluation/education, Compensatory technique education, Energy conservation, Activityengagement          See flowsheet documentation for full assessment, interventions and recommendations.   12/3/2024 1400 by April STEFANO Patterson  Outcome: Progressing  Note: Limitation: Decreased ADL status, Decreased Safe judgement during ADL, Decreased endurance, Decreased self-care trans, Decreased high-level ADLs  Prognosis: Good  Assessment: pt participated in am ot session and was seen focusing on adls tasls using lhae, thp precautioneducation and issuing hand outs. pt and husb had no further questions at completion of session for therapist. pt was provided with elastic laces for home use. pts husb bought a hip kit from "[employer] for pt to use. he will also assist her as needed. pt reports she is going home today with asst from . pt denied dme needs from ot persepective. she has shower seat, toilet adapter and walker.     Rehab Resource Intensity Level, OT: No post-acute rehabilitation needs       12/3/2024 1332 by April A STEFANO Gore  Outcome: Progressing  Note: Limitation: Decreased ADL status, Decreased Safe judgement during ADL, Decreased endurance, Decreased self-care trans, Decreased high-level ADLs  Prognosis: Good  Assessment: Pt seen for Occupational Therapy session with focus on activity tolerance, bed mob,  LB self-care tasks with use of long handle adaptive equipment and functional transfers to bedside chair and functional mob.. Pt cleared by ADRIANO/ Khadra for pt participated in OT session. Pt presented supine/HOB raised pt awake/alert and agreeable to participate in therapy following pt identifiers  confirmed. Pt did not report a therapy goal this session however pt was pleasant and cooperative with all therapy requests. In comparison to pt previous session pt improvement noted for pt pt functional transfers/mob with Hand Held assist.Pt required assist for donning socks  2* pt unable to manage task one-handed utilizing a socks aid at this time  Pt remains appropriate for I ([employer]) Pt set up to bedside chair post session, chair alarm activated and all needs within pt reach     Rehab Resource Intensity Level, OT: I (Maximum Resource Intensity)        April A Storm

## 2024-12-03 NOTE — CASE MANAGEMENT
Per [employer] escalation team, case was tasked to be worked on yesterday. Per [employer], Acute Rehab auth still pending. Follow-up message sent to [employer] Escalation Team.     CM notified: Dwayne Berkowitz
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
## 2024-12-03 NOTE — PLAN OF CARE
Problem: OCCUPATIONAL THERAPY ADULT  Goal: Performs self-care activities at highest level of function for planned discharge setting.  See evaluation for individualized goals.  Description: Treatment Interventions: ADL retraining, Functional transfer training, Endurance training, Patient/family training, Equipment evaluation/education, Compensatory technique education, Energy conservation, Activityengagement          See flowsheet documentation for full assessment, interventions and recommendations.   Outcome: Progressing  Note: Limitation: Decreased ADL status, Decreased Safe judgement during ADL, Decreased endurance, Decreased self-care trans, Decreased high-level ADLs  Prognosis: Good  Assessment: Pt seen for Occupational Therapy session with focus on activity tolerance, bed mob,  LB self-care tasks with use of long handle adaptive equipment and functional transfers to bedside chair and functional mob.. Pt cleared by RN/ Khadra for pt participated in OT session. Pt presented supine/HOB raised pt awake/alert and agreeable to participate in therapy following pt identifiers confirmed. Pt did not report a therapy goal this session however pt was pleasant and cooperative with all therapy requests. In comparison to pt previous session pt improvement noted for pt pt functional transfers/mob with Hand Held assist.Pt required assist for donning socks  2* pt unable to manage task one-handed utilizing a socks aid at this time  Pt remains appropriate for I ([employer]) Pt set up to bedside chair post session, chair alarm activated and all needs within pt reach     Rehab Resource Intensity Level, OT: I (Maximum Resource Intensity)     April A Storm

## 2024-12-03 NOTE — PLAN OF CARE
Problem: MUSCULOSKELETAL - ADULT  Goal: Maintain or return mobility to safest level of function  Description: INTERVENTIONS:  - Assess patient's ability to carry out ADLs; assess patient's baseline for ADL function and identify physical deficits which impact ability to perform ADLs (bathing, care of mouth/teeth, toileting, grooming, dressing, etc.)  - Assess/evaluate cause of self-care deficits   - Assess range of motion  - Assess patient's mobility  - Assess patient's need for assistive devices and provide as appropriate  - Encourage maximum independence but intervene and supervise when necessary  - Involve family in performance of ADLs  - Assess for home care needs following discharge   - Consider OT consult to assist with ADL evaluation and planning for discharge  - Provide patient education as appropriate  Outcome: Progressing  Goal: Maintain proper alignment of affected body part  Description: INTERVENTIONS:  - Support, maintain and protect limb and body alignment  - Provide patient/ family with appropriate education  Outcome: Progressing     Problem: Knowledge Deficit  Goal: Patient/family/caregiver demonstrates understanding of disease process, treatment plan, medications, and discharge instructions  Description: Complete learning assessment and assess knowledge base.  Interventions:  - Provide teaching at level of understanding  - Provide teaching via preferred learning methods  Outcome: Progressing     Problem: DISCHARGE PLANNING  Goal: Discharge to home or other facility with appropriate resources  Description: INTERVENTIONS:  - Identify barriers to discharge w/patient and caregiver  - Arrange for needed discharge resources and transportation as appropriate  - Identify discharge learning needs (meds, wound care, etc.)  - Arrange for interpretive services to assist at discharge as needed  - Refer to Case Management Department for coordinating discharge planning if the patient needs post-hospital services  based on physician/advanced practitioner order or complex needs related to functional status, cognitive ability, or social support system  Outcome: Progressing
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
## 2024-12-03 NOTE — OCCUPATIONAL THERAPY NOTE
Occupational Therapy Treatment Note:      12/03/24 1145   Note Type   Note Type Treatment   Pain Assessment   Pain Assessment Tool FLACC   Pain Rating: FLACC (Rest) - Face 0   Pain Rating: FLACC (Rest) - Legs 0   Pain Rating: FLACC (Rest) - Activity 0   Pain Rating: FLACC (Rest) - Cry 0   Pain Rating: FLACC (Rest) - Consolability 0   Score: FLACC (Rest) 0   Pain Rating: FLACC (Activity) - Face 0   Pain Rating: FLACC (Activity) - Legs 0   Pain Rating: FLACC (Activity) - Activity 0   Pain Rating: FLACC (Activity) - Cry 0   Pain Rating: FLACC (Activity) - Consolability 0   Score: FLACC (Activity) 0   Restrictions/Precautions   LUE Weight Bearing Per Order NWB   LLE Weight Bearing Per Order WBAT   Braces or Orthoses Sling   Other Precautions Pain;Fall Risk;WBS;THR   Lifestyle   Autonomy pt reports  does the cooking at home. will need asst with dynamic iadls at this time   ADL   Where Assessed Chair   UB Dressing Comments pt was reminded of technigue to dress ub   LB Dressing Assistance 4  Minimal Assistance   LB Dressing Comments pt used sock aide with min asst to manage same, fair plus carryover noted  (pt was provided with elastic shoe laces)   Cognition   Overall Cognitive Status WFL   Activity Tolerance   Activity Tolerance Patient tolerated treatment well   Assessment   Assessment pt participated in am ot session and was seen focusing on adls tasls using lhae, thp precautioneducation and issuing hand outs. pt and husb had no further questions at completion of session for therapist. pt was provided with elastic laces for home use. pts husb bought a hip kit from [employer] for pt to use. he will also assist her as needed. pt reports she is going home today with asst from . pt denied dme needs from ot persepective. she has shower seat, toilet adapter and walker.   Plan   Treatment Interventions ADL retraining;Functional transfer training;Endurance training;Patient/family training;Equipment  evaluation/education;Activityengagement   Goal Expiration Date 12/10/24   OT Treatment Day 4   OT Frequency 3-5x/wk   Discharge Recommendation   Rehab Resource Intensity Level, OT No post-acute rehabilitation needs   AM-PAC Daily Activity Inpatient   Lower Body Dressing 3   Bathing 3   Toileting 3   Upper Body Dressing 4   Grooming 4   Eating 4   Daily Activity Raw Score 21   Daily Activity Standardized Score (Calc for Raw Score >=11) 44.27   AM-PAC Applied Cognition Inpatient   Following a Speech/Presentation 4   Understanding Ordinary Conversation 4   Taking Medications 4   Remembering Where Things Are Placed or Put Away 4   Remembering List of 4-5 Errands 4   Taking Care of Complicated Tasks 4   Applied Cognition Raw Score 24   Applied Cognition Standardized Score 62.21     April A Storm
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
## 2024-12-03 NOTE — ARC ADMISSION
Per CM, patient does not want to wait for the outcome of the pending auth for [facility] and would like to discharge home today.  [facility] referral cancelled.

## 2024-12-03 NOTE — CASE MANAGEMENT
Per CM, patient will discharge home. IRF auth requested to be canceled with Roz HENRY @ [facility]. Case still pended at this time as it is with the medical director. Case#: [id number]. CM notified: Gato MAXWELL     Rep working case: Rocio ADEN#: [id number]

## 2024-12-03 NOTE — CASE MANAGEMENT
[facility]  received notification that Acute Rehab authorization is pending >24 hours.     Authorization was initially submitted to insurance: [employer]   Via: [employer] Portal   Date: 11/29  Time:   1237pm  Pending ref# if applicable: [id number]     For facility:  [facility]     Action Taken:   [employer] shows auth still pending. Escalation email sent to regular [employer] Escalation contacts. 2nd escalation email sent to [employer] leadership requesting update ASAP.      Notified DCS Staff and Care Manager: Will A of update.

## 2024-12-03 NOTE — PROGRESS NOTES
Patient:    MRN:  [id number]    Haja Request ID:  [id number]    Level of care reserved:  Home Health Agency    Partner Reserved:  [facility], [place] [address] [phone]    Clinical needs requested:    Geography searched:  [phone]    Start of Service:    Request sent:  1:51pm EST on 12/3/2024 by Dwayne Berkowitz    Partner reserved:  2:41pm EST on 12/3/2024 by Dwayne Berkowitz    Choice list shared:  2:41pm EST on 12/3/2024 by Dwayne Berkowitz
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
## 2024-12-03 NOTE — ASSESSMENT & PLAN NOTE
XR left hip/pelvis demonstrates a closed left femoral neck fracture. POD5 ORIF left elbow, total arthroplasty left hip.    Plan:  Appreciate orthopedics recommendations  WBAT LLE, posterior precautions, abduction pillow in bed  TXA administered  Appreciate endocrinology consult for fragility fx L femoral neck  Lovenox 28 days postoperatively  Case management for dispo planning, accepted to [facility], pending insurance authorization

## 2024-12-04 ENCOUNTER — TELEPHONE (OUTPATIENT)
Age: 79
End: 2024-12-04

## 2024-12-04 ENCOUNTER — TRANSITIONAL CARE MANAGEMENT (OUTPATIENT)
Dept: FAMILY MEDICINE CLINIC | Facility: CLINIC | Age: 79
End: 2024-12-04

## 2024-12-04 ENCOUNTER — HOME CARE VISIT (OUTPATIENT)
Dept: HOME HEALTH SERVICES | Facility: HOME HEALTHCARE | Age: 79
End: 2024-12-04
Payer: COMMERCIAL

## 2024-12-04 VITALS — SYSTOLIC BLOOD PRESSURE: 128 MMHG | DIASTOLIC BLOOD PRESSURE: 65 MMHG | HEART RATE: 88 BPM | OXYGEN SATURATION: 98 %

## 2024-12-04 PROCEDURE — G0151 HHCP-SERV OF PT,EA 15 MIN: HCPCS

## 2024-12-04 PROCEDURE — 400013 VN SOC

## 2024-12-04 NOTE — TELEPHONE ENCOUNTER
Caller: Patient    Doctor: Sandy    Reason for call: Patient called to schedule post op, has duplicate chart - sent task in other chart.

## 2024-12-04 NOTE — UTILIZATION REVIEW
NOTIFICATION OF ADMISSION DISCHARGE   This is a Notification of Discharge from [facility]. Please be advised that this patient has been discharge from our facility. Below you will find the admission and discharge date and time including the patient’s disposition.   UTILIZATION REVIEW CONTACT:  Zulema Agee  Utilization   Network Utilization Review Department  Phone: [phone] x carefully listen to the prompts. All voicemails are confidential.  Email: [email or web address]     ADMISSION INFORMATION  PRESENTATION DATE: 11/24/2024  4:35 PM  OBERVATION ADMISSION DATE: N/A  INPATIENT ADMISSION DATE: 11/24/24  5:08 PM   DISCHARGE DATE: 12/3/2024  5:27 PM   DISPOSITION:Home with Home Health Care    Network Utilization Review Department  ATTENTION: Please call with any questions or concerns to [phone] and carefully listen to the prompts so that you are directed to the right person. All voicemails are confidential.   For Discharge needs, contact Care Management DC Support Team at [phone] opt. 2  Send all requests for admission clinical reviews, approved or denied determinations and any other requests to dedicated fax number below belonging to the campus where the patient is receiving treatment. List of dedicated fax numbers for the Facilities:  FACILITY NAME UR FAX NUMBER   ADMISSION DENIALS (Administrative/Medical Necessity) [phone]   DISCHARGE SUPPORT TEAM ([facility]) [phone]   PARENT CHILD HEALTH (Maternity/NICU/Pediatrics) [phone]   [facility] [phone]   [facility] [phone]   [facility] [phone]   [facility] [phone]   [facility] [phone]   [facility] [phone]   [facility] [phone]   [facility]  [phone]   [facility] [phone]   [facility] [phone]   [facility] [phone]   [facility] [phone]

## 2024-12-04 NOTE — TELEPHONE ENCOUNTER
Caller: Patient    Doctor: Diane Veras    Reason for call: Patient needs to be scheduled for first post op apt from 11/25; cast removal and staple removal. Please advise.    (Patient has duplicate chart)     Call back#: [phone]

## 2024-12-06 ENCOUNTER — HOME CARE VISIT (OUTPATIENT)
Dept: HOME HEALTH SERVICES | Facility: HOME HEALTHCARE | Age: 79
End: 2024-12-06
Payer: COMMERCIAL

## 2024-12-06 VITALS
SYSTOLIC BLOOD PRESSURE: 124 MMHG | TEMPERATURE: 97.3 F | DIASTOLIC BLOOD PRESSURE: 68 MMHG | HEART RATE: 83 BPM | OXYGEN SATURATION: 97 % | RESPIRATION RATE: 17 BRPM

## 2024-12-06 PROCEDURE — G0151 HHCP-SERV OF PT,EA 15 MIN: HCPCS

## 2024-12-09 ENCOUNTER — TELEPHONE (OUTPATIENT)
Age: 79
End: 2024-12-09

## 2024-12-09 ENCOUNTER — HOME CARE VISIT (OUTPATIENT)
Dept: HOME HEALTH SERVICES | Facility: HOME HEALTHCARE | Age: 79
End: 2024-12-09
Payer: COMMERCIAL

## 2024-12-09 VITALS
OXYGEN SATURATION: 98 % | HEART RATE: 85 BPM | DIASTOLIC BLOOD PRESSURE: 68 MMHG | TEMPERATURE: 97.6 F | RESPIRATION RATE: 17 BRPM | SYSTOLIC BLOOD PRESSURE: 118 MMHG

## 2024-12-09 PROCEDURE — G0151 HHCP-SERV OF PT,EA 15 MIN: HCPCS

## 2024-12-09 RX ORDER — PANTOPRAZOLE SODIUM 40 MG/1
40 TABLET, DELAYED RELEASE ORAL
COMMUNITY
Start: 2024-09-03 | End: 2024-12-13 | Stop reason: SDUPTHER

## 2024-12-09 NOTE — TELEPHONE ENCOUNTER
PT r/s her 12/12 TCM appt for 12/19. After I hung up with PT I noticed the LD is 12/16, however I was unable to find any TCM's earlier. Please f/u with PT if Dr Kelly is able to accommodate 12/16 or earlier, so as to not miss TCM deadline.    ThankYou

## 2024-12-10 ENCOUNTER — OFFICE VISIT (OUTPATIENT)
Dept: OBGYN CLINIC | Facility: HOSPITAL | Age: 79
End: 2024-12-10

## 2024-12-10 ENCOUNTER — HOSPITAL ENCOUNTER (OUTPATIENT)
Dept: RADIOLOGY | Facility: HOSPITAL | Age: 79
Discharge: HOME | End: 2024-12-10
Attending: ORTHOPAEDIC SURGERY
Payer: COMMERCIAL

## 2024-12-10 VITALS
HEART RATE: 90 BPM | DIASTOLIC BLOOD PRESSURE: 68 MMHG | SYSTOLIC BLOOD PRESSURE: 109 MMHG | HEIGHT: 68 IN | BODY MASS INDEX: 28.36 KG/M2

## 2024-12-10 DIAGNOSIS — S72.002D ORTHOPEDIC AFTERCARE FOR HEALING TRAUMATIC HIP FRACTURE, LEFT, CLOSED: ICD-10-CM

## 2024-12-10 DIAGNOSIS — Z48.89 AFTERCARE FOLLOWING SURGERY: ICD-10-CM

## 2024-12-10 DIAGNOSIS — Z48.89 AFTERCARE FOLLOWING SURGERY: Primary | ICD-10-CM

## 2024-12-10 DIAGNOSIS — S42.402A CLOSED FRACTURE OF LEFT ELBOW, INITIAL ENCOUNTER: ICD-10-CM

## 2024-12-10 DIAGNOSIS — Z47.1 AFTERCARE FOLLOWING LEFT HIP JOINT REPLACEMENT SURGERY: ICD-10-CM

## 2024-12-10 DIAGNOSIS — Z96.642 AFTERCARE FOLLOWING LEFT HIP JOINT REPLACEMENT SURGERY: ICD-10-CM

## 2024-12-10 PROCEDURE — 73070 X-RAY EXAM OF ELBOW: CPT

## 2024-12-10 PROCEDURE — 99024 POSTOP FOLLOW-UP VISIT: CPT | Performed by: ORTHOPAEDIC SURGERY

## 2024-12-10 PROCEDURE — 73502 X-RAY EXAM HIP UNI 2-3 VIEWS: CPT
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
## 2024-12-11 ENCOUNTER — HOME CARE VISIT (OUTPATIENT)
Dept: HOME HEALTH SERVICES | Facility: HOME HEALTHCARE | Age: 79
End: 2024-12-11
Payer: COMMERCIAL

## 2024-12-11 VITALS — SYSTOLIC BLOOD PRESSURE: 109 MMHG | HEART RATE: 83 BPM | OXYGEN SATURATION: 98 % | DIASTOLIC BLOOD PRESSURE: 63 MMHG

## 2024-12-11 PROCEDURE — G0152 HHCP-SERV OF OT,EA 15 MIN: HCPCS

## 2024-12-12 ENCOUNTER — HOME CARE VISIT (OUTPATIENT)
Dept: HOME HEALTH SERVICES | Facility: HOME HEALTHCARE | Age: 79
End: 2024-12-12
Payer: COMMERCIAL

## 2024-12-12 VITALS
DIASTOLIC BLOOD PRESSURE: 60 MMHG | RESPIRATION RATE: 18 BRPM | OXYGEN SATURATION: 98 % | HEART RATE: 78 BPM | SYSTOLIC BLOOD PRESSURE: 110 MMHG | TEMPERATURE: 97.5 F

## 2024-12-12 PROCEDURE — G0151 HHCP-SERV OF PT,EA 15 MIN: HCPCS

## 2024-12-13 ENCOUNTER — TELEMEDICINE (OUTPATIENT)
Dept: FAMILY MEDICINE CLINIC | Facility: CLINIC | Age: 79
End: 2024-12-13

## 2024-12-13 DIAGNOSIS — M85.89 OSTEOPENIA OF MULTIPLE SITES: ICD-10-CM

## 2024-12-13 DIAGNOSIS — S72.002A LEFT DISPLACED FEMORAL NECK FRACTURE (HCC): ICD-10-CM

## 2024-12-13 DIAGNOSIS — Z09 HOSPITAL DISCHARGE FOLLOW-UP: Primary | ICD-10-CM

## 2024-12-13 DIAGNOSIS — K21.9 GASTROESOPHAGEAL REFLUX DISEASE WITHOUT ESOPHAGITIS: ICD-10-CM

## 2024-12-13 DIAGNOSIS — S42.402D CLOSED FRACTURE OF LEFT ELBOW WITH ROUTINE HEALING, SUBSEQUENT ENCOUNTER: ICD-10-CM

## 2024-12-13 RX ORDER — PANTOPRAZOLE SODIUM 40 MG/1
40 TABLET, DELAYED RELEASE ORAL
Qty: 90 TABLET | Refills: 0 | Status: SHIPPED | OUTPATIENT
Start: 2024-12-13
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
## 2024-12-16 ENCOUNTER — HOME CARE VISIT (OUTPATIENT)
Dept: HOME HEALTH SERVICES | Facility: HOME HEALTHCARE | Age: 79
End: 2024-12-16
Payer: COMMERCIAL

## 2024-12-16 PROCEDURE — G0152 HHCP-SERV OF OT,EA 15 MIN: HCPCS

## 2024-12-17 ENCOUNTER — HOME CARE VISIT (OUTPATIENT)
Dept: HOME HEALTH SERVICES | Facility: HOME HEALTHCARE | Age: 79
End: 2024-12-17
Payer: COMMERCIAL

## 2024-12-17 VITALS — HEART RATE: 90 BPM | OXYGEN SATURATION: 98 % | DIASTOLIC BLOOD PRESSURE: 81 MMHG | SYSTOLIC BLOOD PRESSURE: 126 MMHG

## 2024-12-17 VITALS
TEMPERATURE: 97.4 F | DIASTOLIC BLOOD PRESSURE: 72 MMHG | RESPIRATION RATE: 17 BRPM | HEART RATE: 84 BPM | SYSTOLIC BLOOD PRESSURE: 116 MMHG | OXYGEN SATURATION: 98 %

## 2024-12-17 PROCEDURE — G0151 HHCP-SERV OF PT,EA 15 MIN: HCPCS

## 2024-12-18 DIAGNOSIS — Z48.89 AFTERCARE FOLLOWING SURGERY: Primary | ICD-10-CM

## 2024-12-19 ENCOUNTER — HOME CARE VISIT (OUTPATIENT)
Dept: HOME HEALTH SERVICES | Facility: HOME HEALTHCARE | Age: 79
End: 2024-12-19
Payer: COMMERCIAL

## 2024-12-19 VITALS
HEART RATE: 76 BPM | OXYGEN SATURATION: 97 % | SYSTOLIC BLOOD PRESSURE: 124 MMHG | DIASTOLIC BLOOD PRESSURE: 78 MMHG | RESPIRATION RATE: 17 BRPM | TEMPERATURE: 97.4 F

## 2024-12-19 PROCEDURE — G0151 HHCP-SERV OF PT,EA 15 MIN: HCPCS

## 2024-12-20 ENCOUNTER — HOME CARE VISIT (OUTPATIENT)
Dept: HOME HEALTH SERVICES | Facility: HOME HEALTHCARE | Age: 79
End: 2024-12-20
Payer: COMMERCIAL

## 2024-12-20 PROCEDURE — G0152 HHCP-SERV OF OT,EA 15 MIN: HCPCS

## 2024-12-23 ENCOUNTER — HOME CARE VISIT (OUTPATIENT)
Dept: HOME HEALTH SERVICES | Facility: HOME HEALTHCARE | Age: 79
End: 2024-12-23
Payer: COMMERCIAL

## 2024-12-23 ENCOUNTER — TELEPHONE (OUTPATIENT)
Age: 79
End: 2024-12-23

## 2024-12-23 VITALS — HEART RATE: 80 BPM | OXYGEN SATURATION: 97 % | DIASTOLIC BLOOD PRESSURE: 74 MMHG | SYSTOLIC BLOOD PRESSURE: 128 MMHG

## 2024-12-23 PROCEDURE — G0152 HHCP-SERV OF OT,EA 15 MIN: HCPCS

## 2024-12-23 NOTE — TELEPHONE ENCOUNTER
Caller: patient     Doctor: isidra    Reason for call: patient has been taking blood thinner shots and would like to know an end date. To patients understanding it was for 28 days but she is unsure when her start date was, today would be day 28 since surgery on 11/25/2024  Please advise     Call back#: [phone]

## 2024-12-23 NOTE — TELEPHONE ENCOUNTER
Pt instructed that she only needs them for 28 days from surgery. She has about 8 left and will bring them in at her next appt.

## 2024-12-24 VITALS — DIASTOLIC BLOOD PRESSURE: 75 MMHG | SYSTOLIC BLOOD PRESSURE: 121 MMHG | HEART RATE: 75 BPM | OXYGEN SATURATION: 97 %

## 2024-12-26 ENCOUNTER — HOME CARE VISIT (OUTPATIENT)
Dept: HOME HEALTH SERVICES | Facility: HOME HEALTHCARE | Age: 79
End: 2024-12-26
Payer: COMMERCIAL

## 2024-12-26 VITALS — HEART RATE: 82 BPM | DIASTOLIC BLOOD PRESSURE: 78 MMHG | OXYGEN SATURATION: 97 % | SYSTOLIC BLOOD PRESSURE: 119 MMHG

## 2024-12-26 PROCEDURE — G0152 HHCP-SERV OF OT,EA 15 MIN: HCPCS

## 2024-12-30 DIAGNOSIS — S72.002A LEFT DISPLACED FEMORAL NECK FRACTURE (HCC): ICD-10-CM

## 2024-12-30 RX ORDER — METHOCARBAMOL 500 MG/1
500 TABLET, FILM COATED ORAL EVERY 6 HOURS PRN
Qty: 30 TABLET | Refills: 0 | Status: SHIPPED | OUTPATIENT
Start: 2024-12-30

## 2024-12-30 NOTE — TELEPHONE ENCOUNTER
Reason for call:   [x] Refill   [] Prior Auth  [] Other:     Office:   [] PCP/Provider -   [x] Specialty/Provider - Ortho    Medication: methocarbamol (ROBAXIN) 500 mg     Dose/Frequency: Take 1 tablet (500 mg total) by mouth every 6 (six) hours as needed     Quantity: 30    Pharmacy:   [id number]  [place], [place] - [address]       Does the patient have enough for 3 days?   [] Yes   [x] No - Send as HP to POD

## 2024-12-31 NOTE — TELEPHONE ENCOUNTER
Patient called requesting refill for methocarbamol. Patient made aware medication was refilled on 12.30.2024 for 30 with 0 refills to [facility] pharmacy. Patient instructed to contact the pharmacy to obtain refills of medication. Patient verbalized understanding.

## 2025-01-08 ENCOUNTER — HOSPITAL ENCOUNTER (OUTPATIENT)
Dept: RADIOLOGY | Facility: HOSPITAL | Age: 80
Discharge: HOME | End: 2025-01-08
Attending: ORTHOPAEDIC SURGERY
Payer: COMMERCIAL

## 2025-01-08 ENCOUNTER — OFFICE VISIT (OUTPATIENT)
Dept: OBGYN CLINIC | Facility: HOSPITAL | Age: 80
End: 2025-01-08

## 2025-01-08 VITALS — HEIGHT: 68 IN | WEIGHT: 162.2 LBS | BODY MASS INDEX: 24.58 KG/M2

## 2025-01-08 DIAGNOSIS — Z48.89 AFTERCARE FOLLOWING SURGERY: ICD-10-CM

## 2025-01-08 DIAGNOSIS — Z96.642 STATUS POST TOTAL REPLACEMENT OF LEFT HIP: ICD-10-CM

## 2025-01-08 DIAGNOSIS — S42.402A CLOSED FRACTURE OF LEFT ELBOW, INITIAL ENCOUNTER: ICD-10-CM

## 2025-01-08 DIAGNOSIS — S72.002A LEFT DISPLACED FEMORAL NECK FRACTURE (HCC): Primary | ICD-10-CM

## 2025-01-08 PROCEDURE — 99024 POSTOP FOLLOW-UP VISIT: CPT | Performed by: ORTHOPAEDIC SURGERY

## 2025-01-08 PROCEDURE — 73070 X-RAY EXAM OF ELBOW: CPT
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
## 2025-01-15 ENCOUNTER — TELEPHONE (OUTPATIENT)
Age: 80
End: 2025-01-15

## 2025-01-15 ENCOUNTER — EVALUATION (OUTPATIENT)
Dept: PHYSICAL THERAPY | Facility: REHABILITATION | Age: 80
End: 2025-01-15
Payer: COMMERCIAL

## 2025-01-15 DIAGNOSIS — Z96.642 STATUS POST TOTAL REPLACEMENT OF LEFT HIP: ICD-10-CM

## 2025-01-15 DIAGNOSIS — S42.402A CLOSED FRACTURE OF LEFT ELBOW, INITIAL ENCOUNTER: Primary | ICD-10-CM

## 2025-01-15 PROCEDURE — 97162 PT EVAL MOD COMPLEX 30 MIN: CPT | Performed by: PHYSICAL THERAPIST

## 2025-01-15 PROCEDURE — 97110 THERAPEUTIC EXERCISES: CPT | Performed by: PHYSICAL THERAPIST

## 2025-01-15 NOTE — TELEPHONE ENCOUNTER
Caller: Keila    Doctor: Sandy    Reason for call: Patient states she had her first physical therapy appointment this morning and has some additional questions for Dr Delgado. Patient is requesting a call back. Please return call.     Call back#: [phone]
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
## 2025-01-20 ENCOUNTER — OFFICE VISIT (OUTPATIENT)
Dept: PHYSICAL THERAPY | Facility: REHABILITATION | Age: 80
End: 2025-01-20
Payer: COMMERCIAL

## 2025-01-20 DIAGNOSIS — Z96.642 STATUS POST TOTAL REPLACEMENT OF LEFT HIP: ICD-10-CM

## 2025-01-20 DIAGNOSIS — S42.402A CLOSED FRACTURE OF LEFT ELBOW, INITIAL ENCOUNTER: Primary | ICD-10-CM

## 2025-01-20 PROCEDURE — 97140 MANUAL THERAPY 1/> REGIONS: CPT | Performed by: PHYSICAL THERAPIST

## 2025-01-20 PROCEDURE — 97110 THERAPEUTIC EXERCISES: CPT | Performed by: PHYSICAL THERAPIST

## 2025-01-20 NOTE — PROGRESS NOTES
"Daily Note     Today's date: 2025  Patient name: Keila Giordano  : 1945  MRN: [id number]  Referring provider: Vin Delgado,*  Dx:   Encounter Diagnosis     ICD-10-CM    1. Closed fracture of left elbow, initial encounter  S42.402A       2. Status post total replacement of left hip  Z96.642                      Subjective: Pt reports HEP is going well without complaint or questions.  Pt notes inability to use L UE to put her hair in a  ponytail.        Objective: See treatment diary below      Assessment: Tolerated treatment well. Good tolerance to initiation of exercises this session.  No adverse response reported.  Monitor response NV.  Patient would benefit from continued PT      Plan: Continue per plan of care.  Progress treatment as tolerated.       Precautions: OA, s/p ORIF L elbow, s/p L DEANN  (follow precautions for posterolateral approach)  POC expires Unit limit Auth Expiration date PT/OT + Visit Limit?   3/14 4/day NA BOMN         Visit/Unit Tracking  AUTH Status:  Date 1/15 1/20       NA, RE every 10V Used 1 2        Remaining  9 8          Pertinent Findings:                                                                                           Test / Measure  1/15/25   FOTO (Predicted 72) elbow 60   Predicted 72 (hip) 54                   Manuals 1/15 1/20           PROM L elbow flex/ext and L shld flex/abd TP elbow x5' TP           L hip PROM following precautions for posterior approach (No flex>90*, ADD, OR IR)  TP                                     Neuro Re-Ed             SLR flexion supine  1x10           SLS                                                                              Ther Ex             NS  L5x10'           Pulleys flex  3' 10\"ea           Table slides flex+scap  Flex 10\"x10           Supine elbow extension stretch  2#aw x2'           Bridges  5\" 2x10           Supine clamshell w/TB   Otb 3\" 2x10           Standing b/l hip abd+ext  X15 ea         "   Pt education+ HEP instruction TP 10 mins            Ther Activity             Standing alt marching  x20           Mini squats  2x10           Sit to stand             Side stepping  3 laps at window           Fwd step ups             Lat step ups             Gait Training                                       Modalities

## 2025-01-22 ENCOUNTER — OFFICE VISIT (OUTPATIENT)
Dept: PHYSICAL THERAPY | Facility: REHABILITATION | Age: 80
End: 2025-01-22
Payer: COMMERCIAL

## 2025-01-22 DIAGNOSIS — Z96.642 STATUS POST TOTAL REPLACEMENT OF LEFT HIP: ICD-10-CM

## 2025-01-22 DIAGNOSIS — S42.402A CLOSED FRACTURE OF LEFT ELBOW, INITIAL ENCOUNTER: Primary | ICD-10-CM

## 2025-01-22 PROCEDURE — 97140 MANUAL THERAPY 1/> REGIONS: CPT | Performed by: PHYSICAL THERAPIST

## 2025-01-22 PROCEDURE — 97110 THERAPEUTIC EXERCISES: CPT | Performed by: PHYSICAL THERAPIST

## 2025-01-22 NOTE — PROGRESS NOTES
"Daily Note     Today's date: 2025  Patient name: Keila Giordano  : 1945  MRN: [id number]  Referring provider: Vin Delgado,*  Dx:   Encounter Diagnosis     ICD-10-CM    1. Closed fracture of left elbow, initial encounter  S42.402A       2. Status post total replacement of left hip  Z96.642                      Subjective: Pt reports some soreness after last session.        Objective: See treatment diary below      Assessment: Tolerated treatment well. Pt making progress with ROM.  No significant pain complaints during or after session.  Patient would benefit from continued PT      Plan: Continue per plan of care.  Progress treatment as tolerated.       Precautions: OA, s/p ORIF L elbow, s/p L DEANN  (follow precautions for posterolateral approach)  POC expires Unit limit Auth Expiration date PT/OT + Visit Limit?   3/14 4/day NA BOMN         Visit/Unit Tracking  AUTH Status:  Date 1/15 1/20 1/22      NA, RE every 10V Used 1 2 3       Remaining  9 8 7         Pertinent Findings:                                                                                           Test / Measure  1/15/25   FOTO (Predicted 72) elbow 60   Predicted 72 (hip) 54                   Manuals 1/15 1/20 1/22          PROM L elbow flex/ext and L shld flex/abd TP elbow x5' TP TP          L hip PROM following precautions for posterior approach (No flex>90*, ADD, OR IR)  TP TP                                    Neuro Re-Ed             SLR flexion supine  1x10 2x10          SLS                                                                              Ther Ex             NS  L5x10' L5x10'          Pulleys flex  3' 10\"ea 3' 10\" ea          Table slides flex+scap  Flex 10\"x10 Flex 10\"x10          Supine elbow extension stretch  2#aw x2' 2# aw x2'          Bridges  5\" 2x10 5\" 2x10          Supine clamshell w/TB   Otb 3\" 2x10 Otb 3\" 2x10          Standing b/l hip abd+ext  X15 ea X15 ea          Pt education+ HEP " instruction TP 10 mins            Ther Activity             Standing alt marching  x20 X20 ea          Mini squats  2x10 nv          Sit to stand             Side stepping  3 laps at window nv          Fwd step ups             Lat step ups             Gait Training                                       Modalities

## 2025-01-27 ENCOUNTER — OFFICE VISIT (OUTPATIENT)
Dept: PHYSICAL THERAPY | Facility: REHABILITATION | Age: 80
End: 2025-01-27
Payer: COMMERCIAL

## 2025-01-27 DIAGNOSIS — S42.402A CLOSED FRACTURE OF LEFT ELBOW, INITIAL ENCOUNTER: Primary | ICD-10-CM

## 2025-01-27 DIAGNOSIS — Z96.642 STATUS POST TOTAL REPLACEMENT OF LEFT HIP: ICD-10-CM

## 2025-01-27 PROCEDURE — 97530 THERAPEUTIC ACTIVITIES: CPT | Performed by: PHYSICAL THERAPIST

## 2025-01-27 PROCEDURE — 97110 THERAPEUTIC EXERCISES: CPT | Performed by: PHYSICAL THERAPIST

## 2025-01-27 PROCEDURE — 97140 MANUAL THERAPY 1/> REGIONS: CPT | Performed by: PHYSICAL THERAPIST

## 2025-01-27 NOTE — PROGRESS NOTES
"Daily Note     Today's date: 2025  Patient name: Keila Giordano  : 1945  MRN: [id number]  Referring provider: Vin Delgado,*  Dx:   Encounter Diagnosis     ICD-10-CM    1. Closed fracture of left elbow, initial encounter  S42.402A       2. Status post total replacement of left hip  Z96.642               1on1 w/2 PT's entire session.         Subjective: Pt reports having some elbow pain post performance of HEP yesterday, but better today.   Pt notes now being able to reach the back of her head to put her hair in ponytail.        Objective: See treatment diary below      Assessment: Tolerated treatment well. No pain complaints with exercise performance.  Patient would benefit from continued PT      Plan: Continue per plan of care.      Precautions: OA, s/p ORIF L elbow, s/p L DEANN  (follow precautions for posterolateral approach)  POC expires Unit limit Auth Expiration date PT/OT + Visit Limit?   3/14 4/day NA BOMN         Visit/Unit Tracking  AUTH Status:  Date 1/15 1/20 1/22 1/27     NA, RE every 10V Used 1 2 3 4      Remaining  9 8 7 6        Pertinent Findings:                                                                                           Test / Measure  1/15/25   FOTO (Predicted 72) elbow 60   Predicted 72 (hip) 54                   Manuals 1/15 1/20 1/22 1/27         PROM L elbow flex/ext and L shld flex/abd TP elbow x5' TP TP TP         L hip PROM following precautions for posterior approach (No flex>90*, ADD, OR IR)  TP TP TP                                   Neuro Re-Ed             SLR flexion supine  1x10 2x10 2x10         SLS                                                                              Ther Ex             NS  L5x10' L5x10' L5x10'         Pulleys flex  3' 10\"ea 3' 10\" ea 3' 10\" ea          Table slides flex+scap  Flex 10\"x10 Flex 10\"x10 Flex 10\"x10         Supine elbow extension stretch  2#aw x2' 2# aw x2' 2# aw x2.5'         Bridges  5\" 2x10 5\" 2x10 5\" " "2x10         Supine clamshell w/TB   Otb 3\" 2x10 Otb 3\" 2x10 Otb 3\" 2x10         Standing b/l hip abd+ext  X15 ea X15 ea 2x10 ea         Pt education+ HEP instruction TP 10 mins            Ther Activity             Standing alt marching  x20 X20 ea X20 ea         Mini squats  2x10 nv 2x10         Sit to stand             Side stepping  3 laps at window nv Otb 3 laps at window         Fwd step ups    6\" 2x10         Lat step ups             Gait Training                                       Modalities                                                "

## 2025-01-28 ENCOUNTER — CONSULT (OUTPATIENT)
Dept: GASTROENTEROLOGY | Facility: AMBULARY SURGERY CENTER | Age: 80
End: 2025-01-28
Payer: COMMERCIAL

## 2025-01-28 VITALS
SYSTOLIC BLOOD PRESSURE: 122 MMHG | HEIGHT: 68 IN | DIASTOLIC BLOOD PRESSURE: 78 MMHG | OXYGEN SATURATION: 99 % | HEART RATE: 80 BPM | BODY MASS INDEX: 24.19 KG/M2 | WEIGHT: 159.6 LBS

## 2025-01-28 DIAGNOSIS — K21.9 GASTROESOPHAGEAL REFLUX DISEASE WITHOUT ESOPHAGITIS: Primary | ICD-10-CM

## 2025-01-28 PROCEDURE — 99204 OFFICE O/P NEW MOD 45 MIN: CPT | Performed by: INTERNAL MEDICINE

## 2025-01-28 NOTE — PATIENT INSTRUCTIONS
Take prilosec every other day, and slowly taper  Take 1 tablespoon of metamucil daily, slowly stop colace if you're doing well

## 2025-01-29 ENCOUNTER — OFFICE VISIT (OUTPATIENT)
Dept: PHYSICAL THERAPY | Facility: REHABILITATION | Age: 80
End: 2025-01-29
Payer: COMMERCIAL

## 2025-01-29 DIAGNOSIS — S42.402A CLOSED FRACTURE OF LEFT ELBOW, INITIAL ENCOUNTER: Primary | ICD-10-CM

## 2025-01-29 DIAGNOSIS — Z96.642 STATUS POST TOTAL REPLACEMENT OF LEFT HIP: ICD-10-CM

## 2025-01-29 PROCEDURE — 97530 THERAPEUTIC ACTIVITIES: CPT | Performed by: PHYSICAL THERAPIST

## 2025-01-29 PROCEDURE — 97110 THERAPEUTIC EXERCISES: CPT | Performed by: PHYSICAL THERAPIST

## 2025-01-29 PROCEDURE — 97140 MANUAL THERAPY 1/> REGIONS: CPT | Performed by: PHYSICAL THERAPIST

## 2025-01-29 NOTE — PROGRESS NOTES
"Daily Note     Today's date: 2025  Patient name: Keila Giordano  : 1945  MRN: [id number]  Referring provider: Vin Delgado,*  Dx:   Encounter Diagnosis     ICD-10-CM    1. Closed fracture of left elbow, initial encounter  S42.402A       2. Status post total replacement of left hip  Z96.642               1on1 w/2 PT's entire session.        Subjective: Pt reports she was able to ascend a flight of steps reciprocally recently to good tolerance.  Pt also drove today without issue.        Objective: See treatment diary below      Assessment: Tolerated treatment well. Patient exhibited good technique with therapeutic exercises and would benefit from continued PT.  No pain complaints with exercise performance or post session.  Elbow ROM continues to improve.        Plan: Continue per plan of care.  Progress treatment as tolerated.       Precautions: OA, s/p ORIF L elbow, s/p L DEANN  (follow precautions for posterolateral approach)  POC expires Unit limit Auth Expiration date PT/OT + Visit Limit?   3/14 4/day NA BOMN         Visit/Unit Tracking  AUTH Status:  Date 1/15 1/20 1/22 1/27 1/29    NA, RE every 10V Used 1 2 3 4 5     Remaining  9 8 7 6 5       Pertinent Findings:                                                                                           Test / Measure  1/15/25   FOTO (Predicted 72) elbow 60   Predicted 72 (hip) 54                   Manuals 1/15 1/20 1/22 1/27 1/29        PROM L elbow flex/ext and L shld flex/abd TP elbow x5' TP TP TP TP        L hip PROM following precautions for posterior approach (No flex>90*, ADD, OR IR)  TP TP TP TP                                  Neuro Re-Ed             SLR flexion supine  1x10 2x10 2x10 2x10        SLS                                                                              Ther Ex             NS  L5x10' L5x10' L5x10' L5x10'        Pulleys flex  3' 10\"ea 3' 10\" ea 3' 10\" ea  3' 10\" ea        Table slides flex+scap  Flex 10\"x10 " "Flex 10\"x10 Flex 10\"x10 Flex 10\"x10        Supine elbow extension stretch  2#aw x2' 2# aw x2' 2# aw x2.5' 2# aw x3'        Bridges  5\" 2x10 5\" 2x10 5\" 2x10 5\" 2x10        Supine clamshell w/TB   Otb 3\" 2x10 Otb 3\" 2x10 Otb 3\" 2x10 Gtb 3\" 2x10        Standing b/l hip abd+ext  X15 ea X15 ea 2x10 ea 2x10 ea        Pt education+ HEP instruction TP 10 mins            Ther Activity             Standing alt marching  x20 X20 ea X20 ea X20 ea        Mini squats  2x10 nv 2x10 2x10        Sit to stand             Side stepping  3 laps at window nv Otb 3 laps at window Otb 3 laps at window        Fwd step ups    6\" 2x10 6\" 2x10        Lat step ups             Gait Training                                       Modalities                                                  " None

## 2025-01-30 NOTE — ASSESSMENT & PLAN NOTE
-Patient is relatively asymptomatic-advised her to take Prilosec every other day and slowly taper off if possible  -She can take over-the-counter antacids as needed  -Call if worsening symptoms  -Discussed with her after reviewing her last 2 endoscopies and biopsies both of which are negative for Armando's esophagus that the patient does not have Armando's esophagus  -We can reevaluate, we can have her follow-up as needed in 1 year 2 years and discussed repeat endoscopy if she has recurrent symptoms

## 2025-01-30 NOTE — PROGRESS NOTES
Name: Keila Giordano      : 1945      MRN: [id number]  Encounter Provider: Contreras Ch MD  Encounter Date: 2025   Encounter department: [facility]  :  79-year-old female, with left hip fracture, mild constipation afterwards.  Longstanding history of reflux though relatively asymptomatic.  Diagnosed with Armando's esophagus though biopsies were negative for intestinal metaplasia.  Assessment & Plan  Gastroesophageal reflux disease without esophagitis  -Patient is relatively asymptomatic-advised her to take Prilosec every other day and slowly taper off if possible  -She can take over-the-counter antacids as needed  -Call if worsening symptoms  -Discussed with her after reviewing her last 2 endoscopies and biopsies both of which are negative for Armando's esophagus that the patient does not have Armando's esophagus  -We can reevaluate, we can have her follow-up as needed in 1 year 2 years and discussed repeat endoscopy if she has recurrent symptoms       Mild constipation:  Recommended that she use Metamucil, likely constipation from taking pain medicines and from her recent left hip fracture.    Prior endoscopies, pathology were reviewed.  History was independently reviewed.    History of Present Illness   HPI  Keila Giordano is a 79 y.o. female who presents here to establish care.  This is a pleasant 79-year-old female, longstanding history of GERD.  Was told that she has Armando's esophagus, last upper endoscopy  and prior upper endoscopy 2019 biopsies were negative for intestinal metaplasia.  Patient reports intermittent reflux symptoms, she was placed on PPI therapy because of this history of Armando's esophagus.  She has she denies any typical reflux.  She has been taking pantoprazole daily for many years.  She denies any nausea, vomiting, dysphagia.  Denies any abdominal pain.  Recently unfortunately she had a fall, initially had left hip fracture  "resulting in left total hip replacement.  She had some constipation thereafter was taking Colace, denies any melena or rectal bleeding.  She had a colonoscopy a couple of years ago.    Denies any melena, rectal bleeding.  Otherwise doing well from GI standpoint.    Patient's medical history is notable for history of GERD.    Surgical history is notable for oophorectomy, laparoscopy for endometriosis and left total hip replacement.    Denies any tobacco, drinks 1 drink per week.  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    Family history is notable for father with pancreatic cancer.      Review of Systems  Review of systems was negative except for pertinent positive mentioned in HPI         Objective   /78 (BP Location: Left arm, Patient Position: Sitting, Cuff Size: Standard)   Pulse 80   Ht 5' 8\" (1.727 m)   Wt 72.4 kg (159 lb 9.6 oz)   SpO2 99%   BMI 24.27 kg/m²      Physical Exam  GEN: WN/WD, no acute distress ambulating with cane  HEENT: anicteric, MMM, no cervical lymphadenopathy  CV: RRR, no m/r/g  CHEST: CTA b/l, no WRR  ABD: +BS, soft NT/ND, no hepatosplenomegaly  EXT: no C/C/E  NEURO: AAOx3  SKIN: no rashes      "

## 2025-02-03 ENCOUNTER — OFFICE VISIT (OUTPATIENT)
Dept: PHYSICAL THERAPY | Facility: REHABILITATION | Age: 80
End: 2025-02-03
Payer: COMMERCIAL

## 2025-02-03 DIAGNOSIS — Z96.642 STATUS POST TOTAL REPLACEMENT OF LEFT HIP: ICD-10-CM

## 2025-02-03 DIAGNOSIS — S42.402A CLOSED FRACTURE OF LEFT ELBOW, INITIAL ENCOUNTER: Primary | ICD-10-CM

## 2025-02-03 DIAGNOSIS — Z96.642 AFTERCARE FOLLOWING LEFT HIP JOINT REPLACEMENT SURGERY: Primary | ICD-10-CM

## 2025-02-03 DIAGNOSIS — Z47.1 AFTERCARE FOLLOWING LEFT HIP JOINT REPLACEMENT SURGERY: Primary | ICD-10-CM

## 2025-02-03 PROCEDURE — 97530 THERAPEUTIC ACTIVITIES: CPT | Performed by: PHYSICAL THERAPIST

## 2025-02-03 PROCEDURE — 97110 THERAPEUTIC EXERCISES: CPT | Performed by: PHYSICAL THERAPIST

## 2025-02-03 PROCEDURE — 97140 MANUAL THERAPY 1/> REGIONS: CPT | Performed by: PHYSICAL THERAPIST

## 2025-02-03 NOTE — PROGRESS NOTES
"Daily Note     Today's date: 2/3/2025  Patient name: Keila Giordano  : 1945  MRN: [id number]  Referring provider: Vin Delgado,*  Dx:   Encounter Diagnosis     ICD-10-CM    1. Closed fracture of left elbow, initial encounter  S42.402A       2. Status post total replacement of left hip  Z96.642                      Subjective: Pt reports some elbow soreness lately.  Pt complying with HEP. Pt was able to ambulate around her block this weekend to good tolerance.        Objective: See treatment diary below      Assessment: Tolerated treatment well. No pain complaints during or after session.  Elbow ROM improving, but some deficits persist.  Patient would benefit from continued PT      Plan: Continue per plan of care.  Progress treatment as tolerated.       Precautions: OA, s/p ORIF L elbow, s/p L DEANN  (follow precautions for posterolateral approach)  POC expires Unit limit Auth Expiration date PT/OT + Visit Limit?   3/14 4/day NA BOMN         Visit/Unit Tracking  AUTH Status:  Date 1/15 1/20 1/22 1/27 1/29 2/3   NA, RE every 10V Used 1 2 3 4 5 6    Remaining  9 8 7 6 5 4      Pertinent Findings:                                                                                           Test / Measure  1/15/25   FOTO (Predicted 72) elbow 60   Predicted 72 (hip) 54                   Manuals 1/15 1/20 1/22 1/27 1/29 2/3       PROM L elbow flex/ext and L shld flex/abd TP elbow x5' TP TP TP TP TP       L hip PROM following precautions for posterior approach (No flex>90*, ADD, OR IR)  TP TP TP TP TP                                 Neuro Re-Ed             SLR flexion supine  1x10 2x10 2x10 2x10 2x10       SLS                                                                              Ther Ex             NS  L5x10' L5x10' L5x10' L5x10' L5x10'       Pulleys flex  3' 10\"ea 3' 10\" ea 3' 10\" ea  3' 10\" ea 3' 10\" ea       Table slides flex+scap  Flex 10\"x10 Flex 10\"x10 Flex 10\"x10 Flex 10\"x10 Flex 10\"x10     " "  Elbow ext aarom with cane seated at wall      5\"x10       Supine elbow extension stretch  2#aw x2' 2# aw x2' 2# aw x2.5' 2# aw x3' 3#aw x3'       Bridges  5\" 2x10 5\" 2x10 5\" 2x10 5\" 2x10 5\" 2x10       Supine clamshell w/TB   Otb 3\" 2x10 Otb 3\" 2x10 Otb 3\" 2x10 Gtb 3\" 2x10 Gtb 3\" 2x10       Standing b/l hip abd+ext  X15 ea X15 ea 2x10 ea 2x10 ea 2x10 ea       Pt education+ HEP instruction TP 10 mins            Ther Activity             Standing alt marching  x20 X20 ea X20 ea X20 ea X20 ea       Mini squats  2x10 nv 2x10 2x10 2x10       Sit to stand             Side stepping  3 laps at window nv Otb 3 laps at window Otb 3 laps at window Otb 3 laps at mirror       Fwd step ups    6\" 2x10 6\" 2x10 6\" 2x10       Lat step ups      6\"x15       Gait Training                                       Modalities                                                    "

## 2025-02-05 ENCOUNTER — OFFICE VISIT (OUTPATIENT)
Dept: PHYSICAL THERAPY | Facility: REHABILITATION | Age: 80
End: 2025-02-05
Payer: COMMERCIAL

## 2025-02-05 DIAGNOSIS — Z96.642 STATUS POST TOTAL REPLACEMENT OF LEFT HIP: ICD-10-CM

## 2025-02-05 DIAGNOSIS — S42.402A CLOSED FRACTURE OF LEFT ELBOW, INITIAL ENCOUNTER: Primary | ICD-10-CM

## 2025-02-05 PROCEDURE — 97140 MANUAL THERAPY 1/> REGIONS: CPT | Performed by: PHYSICAL THERAPIST

## 2025-02-05 PROCEDURE — 97530 THERAPEUTIC ACTIVITIES: CPT | Performed by: PHYSICAL THERAPIST

## 2025-02-05 PROCEDURE — 97110 THERAPEUTIC EXERCISES: CPT | Performed by: PHYSICAL THERAPIST

## 2025-02-05 NOTE — PROGRESS NOTES
"Daily Note     Today's date: 2025  Patient name: Keila Giordano  : 1945  MRN: [id number]  Referring provider: Vin Delgado,*  Dx:   Encounter Diagnosis     ICD-10-CM    1. Closed fracture of left elbow, initial encounter  S42.402A       2. 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         Subjective: Pt reports she was able to ambulate 2 blocks yesterday to good tolerance.        Objective: See treatment diary below      Assessment: Tolerated treatment well. Patient exhibited good technique with therapeutic exercises and would benefit from continued PT.  No pain complaints with exercise performance.        Plan: Continue per plan of care.  Progress treatment as tolerated.       Precautions: OA, s/p ORIF L elbow, s/p L DEANN  (follow precautions for posterolateral approach)  POC expires Unit limit Auth Expiration date PT/OT + Visit Limit?   3/14 4/day NA BOMN         Visit/Unit Tracking  AUTH Status:  Date 1/15 1/20 1/22 1/27 1/29 2/3 2/5   NA, RE every 10V Used 1 2 3 4 5 6 7    Remaining  9 8 7 6 5 4 3      Pertinent Findings:                                                                                           Test / Measure  1/15/25 2/   FOTO (Predicted 72) elbow 60 63   Predicted 72 (hip) 54 61                     Manuals 1/15 1/20 1/22 1/27 1/29 2/3 2/5      PROM L elbow flex/ext and L shld flex/abd TP elbow x5' TP TP TP TP TP TP      L hip PROM following precautions for posterior approach (No flex>90*, ADD, OR IR)  TP TP TP TP TP TP                                Neuro Re-Ed             SLR flexion supine  1x10 2x10 2x10 2x10 2x10 2x10      SLS                                                                              Ther Ex             NS  L5x10' L5x10' L5x10' L5x10' L5x10' L5x10'      Pulleys flex  3' 10\"ea 3' 10\" ea 3' 10\" ea  3' 10\" ea 3' 10\" ea 3' 10\" ea      Table slides flex+scap  Flex 10\"x10 " "Flex 10\"x10 Flex 10\"x10 Flex 10\"x10 Flex 10\"x10 Flex 5\"x10      Elbow ext aarom with cane seated at wall      5\"x10 5\"x10      Supine elbow extension stretch  2#aw x2' 2# aw x2' 2# aw x2.5' 2# aw x3' 3#aw x3' 3#aw x3'      Bridges  5\" 2x10 5\" 2x10 5\" 2x10 5\" 2x10 5\" 2x10 5\" 2x10      Supine clamshell w/TB   Otb 3\" 2x10 Otb 3\" 2x10 Otb 3\" 2x10 Gtb 3\" 2x10 Gtb 3\" 2x10 Gtb 3\" 2x10      Standing b/l hip abd+ext  X15 ea X15 ea 2x10 ea 2x10 ea 2x10 ea 2x10 ea      Pt education+ HEP instruction TP 10 mins            Ther Activity             Standing alt marching  x20 X20 ea X20 ea X20 ea X20 ea X20 ea      Mini squats  2x10 nv 2x10 2x10 2x10 2x10      Sit to stand             Side stepping  3 laps at window nv Otb 3 laps at window Otb 3 laps at window Otb 3 laps at mirror Otb 3 laps at mirror      Fwd step ups    6\" 2x10 6\" 2x10 6\" 2x10 6\" 2x10      Lat step ups      6\"x15 6\"x15      Gait Training                                       Modalities                                                      "

## 2025-02-07 ENCOUNTER — RA CDI HCC (OUTPATIENT)
Dept: OTHER | Facility: HOSPITAL | Age: 80
End: 2025-02-07

## 2025-02-10 ENCOUNTER — OFFICE VISIT (OUTPATIENT)
Dept: PHYSICAL THERAPY | Facility: REHABILITATION | Age: 80
End: 2025-02-10
Payer: COMMERCIAL

## 2025-02-10 DIAGNOSIS — S42.402A CLOSED FRACTURE OF LEFT ELBOW, INITIAL ENCOUNTER: Primary | ICD-10-CM

## 2025-02-10 DIAGNOSIS — Z96.642 STATUS POST TOTAL REPLACEMENT OF LEFT HIP: ICD-10-CM

## 2025-02-10 PROCEDURE — 97110 THERAPEUTIC EXERCISES: CPT

## 2025-02-10 PROCEDURE — 97140 MANUAL THERAPY 1/> REGIONS: CPT

## 2025-02-10 NOTE — PROGRESS NOTES
"Daily Note     Today's date: 2/10/2025  Patient name: Keila Giordano  : 1945  MRN: [id number]  Referring provider: Vin Delgado,*  Dx:   Encounter Diagnosis     ICD-10-CM    1. Closed fracture of left elbow, initial encounter  S42.402A       2. Status post total replacement of left hip  Z96.642                      Subjective: Pt reports overall improvement, states that elbow and elbow extension are main concern      Objective: See treatment diary below      Assessment: Tolerated treatment well. Patient would benefit from continued PT.  Pt. able to complete all exercises with no increase in pain during or after session.  Pt 1:1 with PTA [id number], IEP for remainder.      Plan: Continue per plan of care.      Precautions: OA, s/p ORIF L elbow, s/p L DEANN  (follow precautions for posterolateral approach)  POC expires Unit limit Auth Expiration date PT/OT + Visit Limit?   3/14 4/day NA BOMN         Visit/Unit Tracking  AUTH Status:  Date 1/15 1/20 1/22 1/27 1/29 2/3 2/ 2/10   NA, RE every 10V Used 1 2 3 4 5 6 7 8    Remaining  9 8 7 6 5 4 3 2      Pertinent Findings:                                                                                           Test / Measure  1/15/25 2/   FOTO (Predicted 72) elbow 60 63   Predicted 72 (hip) 54 61                     Manuals 1/15 1/20 1/22 1/27 1/29 2/3 2/5 2/10     PROM L elbow flex/ext and L shld flex/abd TP elbow x5' TP TP TP TP TP TP KP 4'     L hip PROM following precautions for posterior approach (No flex>90*, ADD, OR IR)  TP TP TP TP TP TP KP 4'                               Neuro Re-Ed             SLR flexion supine  1x10 2x10 2x10 2x10 2x10 2x10 2x10     SLS                                                                              Ther Ex             NS  L5x10' L5x10' L5x10' L5x10' L5x10' L5x10' 8' L5     Pulleys flex  3' 10\"ea 3' 10\" ea 3' 10\" ea  3' 10\" ea 3' 10\" ea 3' 10\" ea 3' 10\" ea     Table slides flex+scap  Flex 10\"x10 Flex 10\"x10 " "Flex 10\"x10 Flex 10\"x10 Flex 10\"x10 Flex 5\"x10 20x flex     Elbow ext aarom with cane seated at wall      5\"x10 5\"x10 20x5\"     Supine elbow extension stretch  2#aw x2' 2# aw x2' 2# aw x2.5' 2# aw x3' 3#aw x3' 3#aw x3' 3' 3# aw     Bridges  5\" 2x10 5\" 2x10 5\" 2x10 5\" 2x10 5\" 2x10 5\" 2x10 2x10 5\"     Supine clamshell w/TB   Otb 3\" 2x10 Otb 3\" 2x10 Otb 3\" 2x10 Gtb 3\" 2x10 Gtb 3\" 2x10 Gtb 3\" 2x10 2x10 3\" gtb     Standing b/l hip abd+ext  X15 ea X15 ea 2x10 ea 2x10 ea 2x10 ea 2x10 ea 2x10 ea     Pt education+ HEP instruction TP 10 mins            Ther Activity             Standing alt marching  x20 X20 ea X20 ea X20 ea X20 ea X20 ea 20x ea     Mini squats  2x10 nv 2x10 2x10 2x10 2x10 2x10     Sit to stand             Side stepping  3 laps at window nv Otb 3 laps at window Otb 3 laps at window Otb 3 laps at mirror Otb 3 laps at mirror 3 laps mirror OTB     Fwd step ups    6\" 2x10 6\" 2x10 6\" 2x10 6\" 2x10 2x10 6\"     Lat step ups      6\"x15 6\"x15 2x10 6\"     Gait Training                                       Modalities                                                        "

## 2025-02-12 ENCOUNTER — OFFICE VISIT (OUTPATIENT)
Dept: PHYSICAL THERAPY | Facility: REHABILITATION | Age: 80
End: 2025-02-12
Payer: COMMERCIAL

## 2025-02-12 DIAGNOSIS — S42.402A CLOSED FRACTURE OF LEFT ELBOW, INITIAL ENCOUNTER: Primary | ICD-10-CM

## 2025-02-12 DIAGNOSIS — Z96.642 STATUS POST TOTAL REPLACEMENT OF LEFT HIP: ICD-10-CM

## 2025-02-12 PROCEDURE — 97140 MANUAL THERAPY 1/> REGIONS: CPT | Performed by: PHYSICAL THERAPIST

## 2025-02-12 PROCEDURE — 97530 THERAPEUTIC ACTIVITIES: CPT | Performed by: PHYSICAL THERAPIST

## 2025-02-12 PROCEDURE — 97110 THERAPEUTIC EXERCISES: CPT | Performed by: PHYSICAL THERAPIST

## 2025-02-12 NOTE — PROGRESS NOTES
"Daily Note     Today's date: 2025  Patient name: Keila Giordano  : 1945  MRN: [id number]  Referring provider: Vin Delgado,*  Dx:   Encounter Diagnosis     ICD-10-CM    1. Closed fracture of left elbow, initial encounter  S42.402A       2. Status post total replacement of left hip  Z96.642               1on1 [id number] and performed remaining as part of IEP.        Subjective: Pt notes she did some housecleaning yesterday to good tolerance, notes mild soreness today.       Objective: See treatment diary below      Assessment: Tolerated treatment well. Elbow ROM improving, but pt continues to lack full extension.  Pt notes some elbow soreness post session.  Patient exhibited good technique with therapeutic exercises and would benefit from continued PT      Plan: Continue per plan of care.      Precautions: OA, s/p ORIF L elbow, s/p L DEANN  (follow precautions for posterolateral approach)  POC expires Unit limit Auth Expiration date PT/OT + Visit Limit?   3/14 4/day NA BOMN         Visit/Unit Tracking  AUTH Status:  Date  2/3 2/5 2/10 2/12   NA, RE every 10V Used 2 3 4 5 6 7 8 9    Remaining  8 7 6 5 4 3 2 1      Pertinent Findings:                                                                                           Test / Measure  1/15/25 2/5   FOTO (Predicted 72) elbow 60 63   Predicted 72 (hip) 54 61                     Manuals 1/15 1/20 1/22 1/27 1/29 2/3 2/5 2/10 2/12    PROM L elbow flex/ext and L shld flex/abd TP elbow x5' TP TP TP TP TP TP KP 4' TP    L hip PROM following precautions for posterior approach (No flex>90*, ADD, OR IR)  TP TP TP TP TP TP KP 4' TP                              Neuro Re-Ed             SLR flexion supine  1x10 2x10 2x10 2x10 2x10 2x10 2x10 2x10    SLS                                                                              Ther Ex             NS  L5x10' L5x10' L5x10' L5x10' L5x10' L5x10' 8' L5 L5x10'    Pulleys flex  3' 10\"ea 3' " "10\" ea 3' 10\" ea  3' 10\" ea 3' 10\" ea 3' 10\" ea 3' 10\" ea 3' 10\" ea    Table slides flex+scap  Flex 10\"x10 Flex 10\"x10 Flex 10\"x10 Flex 10\"x10 Flex 10\"x10 Flex 5\"x10 20x flex Flex 10\"x10    Elbow ext aarom with cane seated at wall      5\"x10 5\"x10 20x5\" 5\" x20    Supine elbow extension stretch  2#aw x2' 2# aw x2' 2# aw x2.5' 2# aw x3' 3#aw x3' 3#aw x3' 3' 3# aw 3#aw x3'    Bridges  5\" 2x10 5\" 2x10 5\" 2x10 5\" 2x10 5\" 2x10 5\" 2x10 2x10 5\" 5\" 2x10    Supine clamshell w/TB   Otb 3\" 2x10 Otb 3\" 2x10 Otb 3\" 2x10 Gtb 3\" 2x10 Gtb 3\" 2x10 Gtb 3\" 2x10 2x10 3\" gtb Gtb 3\" 2x10    Standing b/l hip abd+ext  X15 ea X15 ea 2x10 ea 2x10 ea 2x10 ea 2x10 ea 2x10 ea 2x10 ea    Pt education+ HEP instruction TP 10 mins            Ther Activity             Standing alt marching  x20 X20 ea X20 ea X20 ea X20 ea X20 ea 20x ea X20 ea    Mini squats  2x10 nv 2x10 2x10 2x10 2x10 2x10 2x10    Sit to stand             Side stepping  3 laps at window nv Otb 3 laps at window Otb 3 laps at window Otb 3 laps at mirror Otb 3 laps at mirror 3 laps mirror OTB Gtb 2 laps at mirror    Fwd step ups    6\" 2x10 6\" 2x10 6\" 2x10 6\" 2x10 2x10 6\" 6\" 2x10    Lat step ups      6\"x15 6\"x15 2x10 6\" 6\" 2x10    Gait Training                                       Modalities                                                          "

## 2025-02-17 ENCOUNTER — OFFICE VISIT (OUTPATIENT)
Dept: PHYSICAL THERAPY | Facility: REHABILITATION | Age: 80
End: 2025-02-17
Payer: COMMERCIAL

## 2025-02-17 DIAGNOSIS — S42.402A CLOSED FRACTURE OF LEFT ELBOW, INITIAL ENCOUNTER: Primary | ICD-10-CM

## 2025-02-17 DIAGNOSIS — Z96.642 STATUS POST TOTAL REPLACEMENT OF LEFT HIP: ICD-10-CM

## 2025-02-17 PROCEDURE — 97530 THERAPEUTIC ACTIVITIES: CPT | Performed by: PHYSICAL THERAPIST

## 2025-02-17 PROCEDURE — 97110 THERAPEUTIC EXERCISES: CPT | Performed by: PHYSICAL THERAPIST

## 2025-02-17 NOTE — PROGRESS NOTES
"Daily Note     Today's date: 2025  Patient name: Keila Giordano  : 1945  MRN: [id number]  Referring provider: Vin Delgado,*  Dx:   Encounter Diagnosis     ICD-10-CM    1. Closed fracture of left elbow, initial encounter  S42.402A       2. Status post total replacement of left hip  Z96.642               1on1 [id number] and performed remaining as part of IEP.         Subjective: Pt reports she is using L UE to do more activities.        Objective: See treatment diary below      Assessment: Tolerated treatment well. Patient exhibited good technique with therapeutic exercises and would benefit from continued PT.  Some elbow extension ROM limitation persists.        Plan:  LAUREN NV.     Precautions: OA, s/p ORIF L elbow, s/p L DEANN  (follow precautions for posterolateral approach)  POC expires Unit limit Auth Expiration date PT/OT + Visit Limit?   3/14 4/day NA BOMN         Visit/Unit Tracking  AUTH Status:  Date 1/22 1/27 1/29 2/3 2/5 2/10 2/12 2/17   NA, RE every 10V Used 3 4 5 6 7 8 9 10    Remaining  7 6 5 4 3 2 1 0      Pertinent Findings:                                                                                           Test / Measure  1/15/25 2/5   FOTO (Predicted 72) elbow 60 63   Predicted 72 (hip) 54 61                     Manuals 1/15 1/20 1/22 1/27 1/29 2/3 2/5 2/10 2/12 2/17   PROM L elbow flex/ext and L shld flex/abd TP elbow x5' TP TP TP TP TP TP KP 4' TP TP   L hip PROM following precautions for posterior approach (No flex>90*, ADD, OR IR)  TP TP TP TP TP TP KP 4' TP TP                             Neuro Re-Ed             SLR flexion supine  1x10 2x10 2x10 2x10 2x10 2x10 2x10 2x10 2x10   SLS          10\"x3                                                                    Ther Ex             NS  L5x10' L5x10' L5x10' L5x10' L5x10' L5x10' 8' L5 L5x10' L5x10'   Pulleys flex  3' 10\"ea 3' 10\" ea 3' 10\" ea  3' 10\" ea 3' 10\" ea 3' 10\" ea 3' 10\" ea 3' 10\" ea 3' 10\" ea   Table slides " "flex+scap  Flex 10\"x10 Flex 10\"x10 Flex 10\"x10 Flex 10\"x10 Flex 10\"x10 Flex 5\"x10 20x flex Flex 10\"x10 Flex 10\"x10   Elbow ext aarom with cane seated at wall      5\"x10 5\"x10 20x5\" 5\" x20 5\"x20   Supine elbow extension stretch  2#aw x2' 2# aw x2' 2# aw x2.5' 2# aw x3' 3#aw x3' 3#aw x3' 3' 3# aw 3#aw x3' 4#aw x3'   Bridges  5\" 2x10 5\" 2x10 5\" 2x10 5\" 2x10 5\" 2x10 5\" 2x10 2x10 5\" 5\" 2x10 3\" 2x10   Supine clamshell w/TB   Otb 3\" 2x10 Otb 3\" 2x10 Otb 3\" 2x10 Gtb 3\" 2x10 Gtb 3\" 2x10 Gtb 3\" 2x10 2x10 3\" gtb Gtb 3\" 2x10 Gtb 3\" 2x10   Standing b/l hip abd+ext  X15 ea X15 ea 2x10 ea 2x10 ea 2x10 ea 2x10 ea 2x10 ea 2x10 ea 2x10 ea   Pt education+ HEP instruction TP 10 mins            Ther Activity             Standing alt marching  x20 X20 ea X20 ea X20 ea X20 ea X20 ea 20x ea X20 ea X20 ea   Mini squats  2x10 nv 2x10 2x10 2x10 2x10 2x10 2x10 2x10   Sit to stand             Side stepping  3 laps at window nv Otb 3 laps at window Otb 3 laps at window Otb 3 laps at mirror Otb 3 laps at mirror 3 laps mirror OTB Gtb 2 laps at mirror Gtb 3 laps at window   Fwd step ups    6\" 2x10 6\" 2x10 6\" 2x10 6\" 2x10 2x10 6\" 6\" 2x10 6\" 2x10   Lat step ups      6\"x15 6\"x15 2x10 6\" 6\" 2x10 6\" 2x10   Gait Training                                       Modalities                                                            "

## 2025-02-19 ENCOUNTER — EVALUATION (OUTPATIENT)
Dept: PHYSICAL THERAPY | Facility: REHABILITATION | Age: 80
End: 2025-02-19
Payer: COMMERCIAL

## 2025-02-19 DIAGNOSIS — S42.402A CLOSED FRACTURE OF LEFT ELBOW, INITIAL ENCOUNTER: Primary | ICD-10-CM

## 2025-02-19 DIAGNOSIS — Z96.642 STATUS POST TOTAL REPLACEMENT OF LEFT HIP: ICD-10-CM

## 2025-02-19 DIAGNOSIS — Z98.890 HISTORY OF OPEN REDUCTION AND INTERNAL FIXATION (ORIF) PROCEDURE: ICD-10-CM

## 2025-02-19 PROCEDURE — 97110 THERAPEUTIC EXERCISES: CPT | Performed by: PHYSICAL THERAPIST

## 2025-02-19 PROCEDURE — 97140 MANUAL THERAPY 1/> REGIONS: CPT | Performed by: PHYSICAL THERAPIST

## 2025-02-19 PROCEDURE — 97530 THERAPEUTIC ACTIVITIES: CPT | Performed by: PHYSICAL THERAPIST

## 2025-02-19 NOTE — PROGRESS NOTES
PT Re-Evaluation     Today's date: 2025  Patient name: Keila Giordano  : 1945  MRN: [id number]  Referring provider: Vin Delgado,*  Dx:   Encounter Diagnosis     ICD-10-CM    1. Closed fracture of left elbow, initial encounter  S42.402A       2. Status post total replacement of left hip  Z96.642            Updated measurements and functional status taken this session. 1on1 [id number] and performed remaining as part of IEP.           Assessment  Impairments: abnormal or restricted ROM, activity intolerance, impaired physical strength, pain with function and activity limitations    Assessment details: Pt has demonstrated improvement in elbow and hip ROM, strength, and function with a decrease in pain since starting therapy.  Pt continues to present with deficits in elbow extension ROM, LE strength, and function and would benefit from continued skilled PT intervention to address these issues and to maximize function.  Pt continues to follow her hip precautions at this time.  Pt is scheduled to follow up with MD tomorrow.  Please advise if continued PT is desired.  Thank you.    Understanding of Dx/Px/POC: good     Prognosis: good    Goals  Short Term:  Pt will report decreased levels of pain by at least 2 subjective ratings in 4 weeks-met  Pt will demonstrate improved ROM by at least 10 degrees in 4 weeks-met-progressing  Pt will demonstrate improved strength by 1/2 grade MMT in 4 weeks-met-progressing  Long Term:   Pt will be independent in their HEP in 8 weeks-partially met  Pt will demonstrate improved FOTO, > predicted level-partially met-progressing  Pt will be independent with all ADL's-partially met-progressing  Pt will perform steps in reciprocal pattern with use of HR-met  Pt will resume ambulating community distances -partially met-progressing      Plan  Patient would benefit from: skilled physical therapy    Planned therapy interventions: balance, manual therapy, neuromuscular  re-education, patient/caregiver education, strengthening, stretching, flexibility, graded exercise, gait training, therapeutic activities, therapeutic exercise and home exercise program    Frequency: 2x week  Duration in weeks: 5  Plan of Care beginning date: 2025  Plan of Care expiration date: 3/28/2025  Treatment plan discussed with: patient  Plan details: Patient was educated in HEP and Plan of Care.  All questions were answered to pt's satisfaction.          Subjective Evaluation    History of Present Illness  Mechanism of injury: Pt reports overall progress since starting therapy.  Pt notes some tightness persists in her elbow.  Pt notes improvement with dressing ( ties her shoes as pt does not want to bend down with her hip precautions), showering (no longer using chair), steps (performing reciprocally with railing), household activities (cautious with  bending down to perform anything at a low height), reaching top of her head to do her hair, ambulation (no longer using assistive device indoors and is weaning from use of SPC outdoors).    Patient Goals  Patient goals for therapy: decreased pain, increased motion, improved balance, independence with ADLs/IADLs, return to sport/leisure activities and increased strength    Pain  At best pain ratin  At worst pain rating: 3  Location: L elbow  Quality: sharp and tight (pinching)    Treatments  Current treatment: physical therapy      Objective     Active Range of Motion   Left Shoulder   Flexion: 148 degrees   Abduction: 150 degrees   External rotation BTH: C7     Left Elbow   Flexion: 145 degrees   Extension: 28 degrees   Left Hip   Flexion: 90 degrees   Extension: WFL  Abduction: WFL    Passive Range of Motion   Left Shoulder   Flexion: 165 degrees   Abduction: 160 degrees     Left Elbow   Flexion: 150 degrees   Extension: 10 degrees   Left Hip   Flexion: 90 degrees   Abduction: WFL  External rotation (90/90): WFL    Strength/Myotome Testing  "    Left Hip   Planes of Motion   Flexion: 5  Abduction: 4  External rotation: 4+    Left Knee   Flexion: 5             Precautions: OA, s/p ORIF L elbow, s/p L DEANN 11/25 (follow precautions for posterolateral approach)  POC expires Unit limit Auth Expiration date PT/OT + Visit Limit?   3/14 4/day NA BOMN             Visit/Unit Tracking  AUTH Status:  Date 1/22 1/27 1/29 2/3 2/5 2/10 2/12 2/17 2/19   NA, RE every 10V Used 3 4 5 6 7 8 9 10 1 RE     Remaining  7 6 5 4 3 2 1 0 9      Pertinent Findings:                                                                                           Test / Measure  1/15/25 2/5 2/19   FOTO (Predicted 72) elbow 60 63 65   Predicted 72 (hip) 54 61 67                                 Manuals 1/20 1/22 1/27 1/29 2/3 2/5 2/10 2/12 2/17 2/19   PROM L elbow flex/ext and L shld flex/abd TP TP TP TP TP TP KP 4' TP TP TP   L hip PROM following precautions for posterior approach (No flex>90*, ADD, OR IR) TP TP TP TP TP TP KP 4' TP TP TP                                                 Neuro Re-Ed                      SLR flexion supine 1x10 2x10 2x10 2x10 2x10 2x10 2x10 2x10 2x10 2x10   SLS                 10\"x3                                                                                                                       Ther Ex                      NS L5x10' L5x10' L5x10' L5x10' L5x10' L5x10' 8' L5 L5x10' L5x10' nv   Pulleys flex 3' 10\"ea 3' 10\" ea 3' 10\" ea  3' 10\" ea 3' 10\" ea 3' 10\" ea 3' 10\" ea 3' 10\" ea 3' 10\" ea 3' 10\" ea   Table slides flex+scap Flex 10\"x10 Flex 10\"x10 Flex 10\"x10 Flex 10\"x10 Flex 10\"x10 Flex 5\"x10 20x flex Flex 10\"x10 Flex 10\"x10 Flex 10\"x10   Elbow ext aarom with cane seated at wall         5\"x10 5\"x10 20x5\" 5\" x20 5\"x20    Supine elbow extension stretch 2#aw x2' 2# aw x2' 2# aw x2.5' 2# aw x3' 3#aw x3' 3#aw x3' 3' 3# aw 3#aw x3' 4#aw x3' 4#aw x3'   Bridges 5\" 2x10 5\" 2x10 5\" 2x10 5\" 2x10 5\" 2x10 5\" 2x10 2x10 5\" 5\" 2x10 3\" 2x10 3\" 2x10   Supine clamshell " "w/TB  Otb 3\" 2x10 Otb 3\" 2x10 Otb 3\" 2x10 Gtb 3\" 2x10 Gtb 3\" 2x10 Gtb 3\" 2x10 2x10 3\" gtb Gtb 3\" 2x10 Gtb 3\" 2x10 Gtb 3\"x20   Standing b/l hip abd+ext X15 ea X15 ea 2x10 ea 2x10 ea 2x10 ea 2x10 ea 2x10 ea 2x10 ea 2x10 ea 2x10 ea   Re-assessment          TP   Pt education+ HEP instruction                      Ther Activity                      Standing alt marching x20 X20 ea X20 ea X20 ea X20 ea X20 ea 20x ea X20 ea X20 ea X20 ea   Mini squats 2x10 nv 2x10 2x10 2x10 2x10 2x10 2x10 2x10 2x10   Sit to stand                      Side stepping 3 laps at window nv Otb 3 laps at window Otb 3 laps at window Otb 3 laps at mirror Otb 3 laps at mirror 3 laps mirror OTB Gtb 2 laps at mirror Gtb 3 laps at window Gtb 3 laps at window   Fwd step ups     6\" 2x10 6\" 2x10 6\" 2x10 6\" 2x10 2x10 6\" 6\" 2x10 6\" 2x10 6\" 2x10   Lat step ups         6\"x15 6\"x15 2x10 6\" 6\" 2x10 6\" 2x10 6\" 2x10   Gait Training                                                                    Modalities                                                                            "

## 2025-02-20 ENCOUNTER — HOSPITAL ENCOUNTER (OUTPATIENT)
Dept: RADIOLOGY | Facility: HOSPITAL | Age: 80
Discharge: HOME | End: 2025-02-20
Attending: ORTHOPAEDIC SURGERY
Payer: COMMERCIAL

## 2025-02-20 ENCOUNTER — OFFICE VISIT (OUTPATIENT)
Dept: OBGYN CLINIC | Facility: HOSPITAL | Age: 80
End: 2025-02-20

## 2025-02-20 VITALS — BODY MASS INDEX: 24.27 KG/M2 | HEIGHT: 68 IN

## 2025-02-20 DIAGNOSIS — Z98.890 HISTORY OF OPEN REDUCTION AND INTERNAL FIXATION (ORIF) PROCEDURE: ICD-10-CM

## 2025-02-20 DIAGNOSIS — Z96.642 AFTERCARE FOLLOWING LEFT HIP JOINT REPLACEMENT SURGERY: ICD-10-CM

## 2025-02-20 DIAGNOSIS — Z47.1 AFTERCARE FOLLOWING LEFT HIP JOINT REPLACEMENT SURGERY: ICD-10-CM

## 2025-02-20 DIAGNOSIS — Z48.89 AFTERCARE FOLLOWING SURGERY: ICD-10-CM

## 2025-02-20 DIAGNOSIS — Z48.89 AFTERCARE FOLLOWING SURGERY: Primary | ICD-10-CM

## 2025-02-20 PROCEDURE — 73502 X-RAY EXAM HIP UNI 2-3 VIEWS: CPT

## 2025-02-20 PROCEDURE — 73070 X-RAY EXAM OF ELBOW: CPT

## 2025-02-20 PROCEDURE — 99024 POSTOP FOLLOW-UP VISIT: CPT | Performed by: ORTHOPAEDIC SURGERY

## 2025-02-20 RX ORDER — CEPHALEXIN 500 MG/1
CAPSULE ORAL
Qty: 40 CAPSULE | Refills: 0 | Status: SHIPPED | OUTPATIENT
Start: 2025-02-20 | End: 2025-03-06

## 2025-02-20 NOTE — PROGRESS NOTES
Assessment:   Diagnosis ICD-10-CM Associated Orders   1. Aftercare following surgery  Z48.89 XR elbow 2 vw left      2. Aftercare following left hip joint replacement surgery  Z47.1 cephalexin (KEFLEX) 500 mg capsule    Z96.642       3. History of open reduction and internal fixation (ORIF) procedure of left elbow  Z98.890 Ambulatory Referral to Physical Therapy          Plan:  79 y.o. female 3 months s/p left DEANN and ORIF left proximal ulna, 11/25/2024  Continue home physical therapy for left hip  Script provided today to return to PT for ROM of left elbow  Continue weight bearing activities as tolerated   Continue increasing physical activity  Continue OTC pain medications as needed   The patient was educated on prophylactic antibiotic use prior to dental visits for the next 2 years   Antibiotic sent to pharmacy today   Follow up in 3 months for 6 month post-op appointment with repeat x-rays of left hip and left elbow      The above stated was discussed in layman's terms and the patient expressed understanding.  All questions were answered to the patient's satisfaction.     To do next visit:  Return in about 3 months (around 5/20/2025) for left hip and left elbow.      Subjective:   Keila Giordano is a 79 y.o. female who presents 3 months status post left total hip arthroplasty and ORIF left proximal ulna, 11/25/2024.  In regard to her left hip, she is doing very well.  She denies little to no pain of the hip however admits to occasional stiffness.  She has completed formal physical therapy and no longer requires use of ambulatory assistive device.  She continues to make progress with her left elbow.  Patient explains tenderness over the olecranon process, which is not uncommon after surgical intervention.      Review of systems negative unless otherwise specified in HPI    Past Medical History:   Diagnosis Date   • Arthritis    • Colitis    • Endometriosis    • GERD (gastroesophageal reflux disease)        Past  Surgical History:   Procedure Laterality Date   • BELPHAROPTOSIS REPAIR Bilateral    • COLONOSCOPY  01/04/2023    polyp- 5 years   • COLPOPEXY      laparoscopic   • ELBOW FRACTURE REPAIR Left 11/25/2024    Procedure: OPEN REDUCTION W/ INTERNAL FIXATION (ORIF) ELBOW, LEFT;  Surgeon: Vin Delgado MD;  Location: [facility];  Service: Orthopedics   • ELBOW SURGERY  11/25/24   • HIP ARTHROPLASTY Left 11/25/2024    Procedure: ARTHROPLASTY HIP TOTAL, LEFT;  Surgeon: Vin Delgado MD;  Location: [facility];  Service: Orthopedics   • JOINT REPLACEMENT  11/25/24   • LA HYSTEROSCOPY BX ENDOMETRIUM&/POLYPC W/WO D&C N/A 2/17/2023    Procedure: DILATATION AND CURETTAGE (D&C) WITH HYSTEROSCOPY;  Surgeon: Pablo Pappas MD;  Location: [facility];  Service: Gynecology   • LA LAPAROSCOPY W/RMVL ADNEXAL STRUCTURES Bilateral 2/17/2023    Procedure: DIAGNOSTIC LAPAROSCOPY BILATERAL REMOVAL OF OVARIES AND FALLOPIAN TUBES, PELVIC WASHINGS AND ANY OTHER NECESSARY PROCEDURES;  Surgeon: Pablo Pappas MD;  Location: [facility];  Service: Gynecology   • TONSILLECTOMY         Family History   Problem Relation Age of Onset   • Cancer Father    • Diabetes Brother    • Heart failure Mother         Death at 88 years   • Dementia Mother    • Atrial fibrillation Mother    • Pancreatic cancer Father    • Sleep apnea Brother    • Atrial fibrillation Brother    • Breast cancer Paternal Aunt        Social History     Occupational History   • Not on file   Tobacco Use   • Smoking status: Never     Passive exposure: Never   • Smokeless tobacco: Never   Vaping Use   • Vaping status: Former   Substance and Sexual Activity   • Alcohol use: Yes     Alcohol/week: 7.0 standard drinks of alcohol     Types: 7 Glasses of wine per week   • Drug use: Never   • Sexual activity: Not Currently         Current Outpatient Medications:   •  cephalexin (KEFLEX) 500 mg capsule, Take 4 capsules 1 hour prior to dental procedures or cleanings, Disp: 40  capsule, Rfl: 0  •  acetaminophen (TYLENOL) 325 mg tablet, Take 2 tablets (650 mg total) by mouth every 6 (six) hours as needed for mild pain, Disp: 240 tablet, Rfl: 0  •  B Complex Vitamins (VITAMIN B COMPLEX 100 IJ), , Disp: , Rfl:   •  Calcium-Magnesium 500-250 MG TABS, Take by mouth Take one tablet by mouth daily. (Patient not taking: Reported on 1/28/2025), Disp: , Rfl:   •  cholecalciferol (VITAMIN D3) 1,000 units tablet, Take 5,000 Units by mouth daily (Patient not taking: Reported on 1/28/2025), Disp: , Rfl:   •  Cholecalciferol (Vitamin D3) 125 MCG (5000 UT) CAPS, Take 1 capsule by mouth daily. Indications: Vitamin D Deficiency, Disp: , Rfl:   •  Coenzyme Q10 (CoQ-10) 100 MG capsule, Take 100 mg by mouth daily, Disp: , Rfl:   •  docusate sodium (COLACE) 100 mg capsule, Take 100 mg by mouth daily. Indications: Constipation, Disp: , Rfl:   •  enoxaparin (Lovenox) 40 mg/0.4 mL, Inject 0.4 mL (40 mg total) under the skin in the morning for 28 days, Disp: 11.2 mL, Rfl: 0  •  Melatonin 10 MG CAPS, Take 10 mg by mouth daily. Indications: to sleep, Disp: , Rfl:   •  methocarbamol (ROBAXIN) 500 mg tablet, Take 1 tablet (500 mg total) by mouth every 6 (six) hours as needed for muscle spasms (Patient not taking: Reported on 1/28/2025), Disp: 30 tablet, Rfl: 0  •  Moringa Oleifera (MORINGA PO), Take 800 mg by mouth daily., Disp: , Rfl:   •  Omega-3 Fatty Acids (OMEGA 3 PO), Take by mouth Patient takes 2 tablets daily, Disp: , Rfl:   •  pantoprazole (PROTONIX) 40 mg tablet, Take 1 tablet (40 mg total) by mouth daily, Disp: , Rfl:   •  pantoprazole (PROTONIX) 40 mg tablet, Take 1 tablet (40 mg total) by mouth daily before breakfast Take 30 minutes before (Patient not taking: Reported on 1/28/2025), Disp: 90 tablet, Rfl: 0  •  Probiotic Product (PROBIOTIC PO), Take by mouth Take one tablet by mouth daily., Disp: , Rfl:     No Known Allergies       There were no vitals filed for this visit.    Objective:  Physical  "exam  General: Awake, Alert, Oriented  Eyes: Pupils equal, round and reactive to light  Heart: regular rate and rhythm  Lungs: No audible wheezing  Abdomen: soft                    Ortho Exam  Left hip:  Well healed posterior incision  No erythema, edema, or signs of infection  Good arc of motion with no pain  Patient sits comfortably in chair with hip flexed at 90 degrees  Patient stands from seated position without assistance  Calf compartments soft and supple  Sensation intact  Toes are warm sensate and mobile     Left elbow:  Well healed posterior incision  No erythema and no ecchymosis  Extension 35  Flexion 110  Sensation intact  Fingers are warm sensate and mobile     Diagnostics, reviewed and taken today if performed as documented:    left hip xray:   - Well aligned prosthesis without evidence of acute fractures, dislocations, acute changes, or hardware failure   - Prosthesis appears properly sized and properly bonded to surrounding bone       Left elbow x-ray:   -Status post ORIF with stable hardware, without any evidence of failure  -Interval callus formation at surrounding fracture sites      Procedures, if performed today:    None performed      Portions of the record may have been created with voice recognition software.  Occasional wrong word or \"sound a like\" substitutions may have occurred due to the inherent limitations of voice recognition software.  Read the chart carefully and recognize, using context, where substitutions have occurred.      "

## 2025-02-24 ENCOUNTER — OFFICE VISIT (OUTPATIENT)
Dept: FAMILY MEDICINE CLINIC | Facility: CLINIC | Age: 80
End: 2025-02-24
Payer: COMMERCIAL

## 2025-02-24 VITALS
WEIGHT: 162 LBS | TEMPERATURE: 98.1 F | OXYGEN SATURATION: 95 % | SYSTOLIC BLOOD PRESSURE: 110 MMHG | RESPIRATION RATE: 17 BRPM | BODY MASS INDEX: 24.55 KG/M2 | HEART RATE: 71 BPM | DIASTOLIC BLOOD PRESSURE: 70 MMHG | HEIGHT: 68 IN

## 2025-02-24 DIAGNOSIS — Z12.31 ENCOUNTER FOR SCREENING MAMMOGRAM FOR BREAST CANCER: ICD-10-CM

## 2025-02-24 DIAGNOSIS — Z00.00 MEDICARE ANNUAL WELLNESS VISIT, SUBSEQUENT: Primary | ICD-10-CM

## 2025-02-24 DIAGNOSIS — K21.9 GASTROESOPHAGEAL REFLUX DISEASE WITHOUT ESOPHAGITIS: ICD-10-CM

## 2025-02-24 PROCEDURE — G0439 PPPS, SUBSEQ VISIT: HCPCS | Performed by: FAMILY MEDICINE

## 2025-02-24 NOTE — ASSESSMENT & PLAN NOTE
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

## 2025-02-24 NOTE — PATIENT INSTRUCTIONS
Medicare Preventive Visit Patient Instructions  Thank you for completing your Welcome to Medicare Visit or Medicare Annual Wellness Visit today. Your next wellness visit will be due in one year (2/25/2026).  The screening/preventive services that you may require over the next 5-10 years are detailed below. Some tests may not apply to you based off risk factors and/or age. Screening tests ordered at today's visit but not completed yet may show as past due. Also, please note that scanned in results may not display below.  Preventive Screenings:  Service Recommendations Previous Testing/Comments   Colorectal Cancer Screening  * Colonoscopy    * Fecal Occult Blood Test (FOBT)/Fecal Immunochemical Test (FIT)  * Fecal DNA/Cologuard Test  * Flexible Sigmoidoscopy Age: 45-75 years old   Colonoscopy: every 10 years (may be performed more frequently if at higher risk)  OR  FOBT/FIT: every 1 year  OR  Cologuard: every 3 years  OR  Sigmoidoscopy: every 5 years  Screening may be recommended earlier than age 45 if at higher risk for colorectal cancer. Also, an individualized decision between you and your healthcare provider will decide whether screening between the ages of 76-85 would be appropriate. Colonoscopy: 03/29/2018  FOBT/FIT: Not on file  Cologuard: Not on file  Sigmoidoscopy: Not on file          Breast Cancer Screening Age: 40+ years old  Frequency: every 1-2 years  Not required if history of left and right mastectomy Mammogram: Not on file        Cervical Cancer Screening Between the ages of 21-29, pap smear recommended once every 3 years.   Between the ages of 30-65, can perform pap smear with HPV co-testing every 5 years.   Recommendations may differ for women with a history of total hysterectomy, cervical cancer, or abnormal pap smears in past. Pap Smear: Not on file    Screening Not Indicated   Hepatitis C Screening Once for adults born between 1945 and 1965  More frequently in patients at high risk for Hepatitis  C Hep C Antibody: 02/19/2019    Screening Current   Diabetes Screening 1-2 times per year if you're at risk for diabetes or have pre-diabetes Fasting glucose: 97 mg/dL (2/7/2023)  A1C: No results in last 5 years (No results in last 5 years)  Screening Current   Cholesterol Screening Once every 5 years if you don't have a lipid disorder. May order more often based on risk factors. Lipid panel: 02/07/2023    Screening Current     Other Preventive Screenings Covered by Medicare:  Abdominal Aortic Aneurysm (AAA) Screening: covered once if your at risk. You're considered to be at risk if you have a family history of AAA.  Lung Cancer Screening: covers low dose CT scan once per year if you meet all of the following conditions: (1) Age 55-77; (2) No signs or symptoms of lung cancer; (3) Current smoker or have quit smoking within the last 15 years; (4) You have a tobacco smoking history of at least 20 pack years (packs per day multiplied by number of years you smoked); (5) You get a written order from a healthcare provider.  Glaucoma Screening: covered annually if you're considered high risk: (1) You have diabetes OR (2) Family history of glaucoma OR (3)  aged 50 and older OR (4)  American aged 65 and older  Osteoporosis Screening: covered every 2 years if you meet one of the following conditions: (1) You're estrogen deficient and at risk for osteoporosis based off medical history and other findings; (2) Have a vertebral abnormality; (3) On glucocorticoid therapy for more than 3 months; (4) Have primary hyperparathyroidism; (5) On osteoporosis medications and need to assess response to drug therapy.   Last bone density test (DXA Scan): 10/16/2024.  HIV Screening: covered annually if you're between the age of 15-65. Also covered annually if you are younger than 15 and older than 65 with risk factors for HIV infection. For pregnant patients, it is covered up to 3 times per  pregnancy.    Immunizations:  Immunization Recommendations   Influenza Vaccine Annual influenza vaccination during flu season is recommended for all persons aged >= 6 months who do not have contraindications   Pneumococcal Vaccine   * Pneumococcal conjugate vaccine = PCV13 (Prevnar 13), PCV15 (Vaxneuvance), PCV20 (Prevnar 20)  * Pneumococcal polysaccharide vaccine = PPSV23 (Pneumovax) Adults 19-63 yo with certain risk factors or if 65+ yo  If never received any pneumonia vaccine: recommend Prevnar 20 (PCV20)  Give PCV20 if previously received 1 dose of PCV13 or PPSV23   Hepatitis B Vaccine 3 dose series if at intermediate or high risk (ex: diabetes, end stage renal disease, liver disease)   Respiratory syncytial virus (RSV) Vaccine - COVERED BY MEDICARE PART D  * RSVPreF3 (Arexvy) CDC recommends that adults 60 years of age and older may receive a single dose of RSV vaccine using shared clinical decision-making (SCDM)   Tetanus (Td) Vaccine - COST NOT COVERED BY MEDICARE PART B Following completion of primary series, a booster dose should be given every 10 years to maintain immunity against tetanus. Td may also be given as tetanus wound prophylaxis.   Tdap Vaccine - COST NOT COVERED BY MEDICARE PART B Recommended at least once for all adults. For pregnant patients, recommended with each pregnancy.   Shingles Vaccine (Shingrix) - COST NOT COVERED BY MEDICARE PART B  2 shot series recommended in those 19 years and older who have or will have weakened immune systems or those 50 years and older     Health Maintenance Due:      Topic Date Due   • Breast Cancer Screening: Mammogram  Never done   • Hepatitis C Screening  Completed   • Colorectal Cancer Screening  Discontinued     Immunizations Due:      Topic Date Due   • Influenza Vaccine (1) 09/01/2024   • COVID-19 Vaccine (7 - 2024-25 season) 09/01/2024     Advance Directives   What are advance directives?  Advance directives are legal documents that state your wishes  and plans for medical care. These plans are made ahead of time in case you lose your ability to make decisions for yourself. Advance directives can apply to any medical decision, such as the treatments you want, and if you want to donate organs.   What are the types of advance directives?  There are many types of advance directives, and each state has rules about how to use them. You may choose a combination of any of the following:  Living will:  This is a written record of the treatment you want. You can also choose which treatments you do not want, which to limit, and which to stop at a certain time. This includes surgery, medicine, IV fluid, and tube feedings.   Durable power of  for healthcare (DPAHC):  This is a written record that states who you want to make healthcare choices for you when you are unable to make them for yourself. This person, called a proxy, is usually a family member or a friend. You may choose more than 1 proxy.  Do not resuscitate (DNR) order:  A DNR order is used in case your heart stops beating or you stop breathing. It is a request not to have certain forms of treatment, such as CPR. A DNR order may be included in other types of advance directives.  Medical directive:  This covers the care that you want if you are in a coma, near death, or unable to make decisions for yourself. You can list the treatments you want for each condition. Treatment may include pain medicine, surgery, blood transfusions, dialysis, IV or tube feedings, and a ventilator (breathing machine).  Values history:  This document has questions about your views, beliefs, and how you feel and think about life. This information can help others choose the care that you would choose.  Why are advance directives important?  An advance directive helps you control your care. Although spoken wishes may be used, it is better to have your wishes written down. Spoken wishes can be misunderstood, or not followed.  "Treatments may be given even if you do not want them. An advance directive may make it easier for your family to make difficult choices about your care.   Fall Prevention    Fall prevention  includes ways to make your home and other areas safer. It also includes ways you can move more carefully to prevent a fall. Health conditions that cause changes in your blood pressure, vision, or muscle strength and coordination may increase your risk for falls. Medicines may also increase your risk for falls if they make you dizzy, weak, or sleepy.   Fall prevention tips:   Stand or sit up slowly.    Use assistive devices as directed.    Wear shoes that fit well and have soles that .    Wear a personal alarm.    Stay active.    Manage your medical conditions.    Home Safety Tips:  Add items to prevent falls in the bathroom.    Keep paths clear.    Install bright lights in your home.    Keep items you use often on shelves within reach.    Paint or place reflective tape on the edges of your stairs.    Alcohol Use and Your Health    Drinking too much can harm your health.  Excessive alcohol use leads to about 88,000 death in the [place] each year, and shortens the life of those who diet by almost 30 years.  Further, excessive drinking cost the economy $249 billion in 2010.  Most excessive drinkers are not alcohol dependent.    Excessive alcohol use has immediate effects that increase the risk of many harmful health conditions.  These are most often the result of binge drinking.  Over time, excessive alcohol use can lead to the development of chronic diseases and other series health problems.    What is considered a \"drink\"?        Excessive alcohol use includes:  Binge Drinking: For women, 4 or more drinks consumed on one occasion. For men, 5 or more drinks consumed on one occasion.  Heavy Drinking: For women, 8 or more drinks per week. For men, 15 or more drinks per week  Any alcohol used by pregnant women  Any alcohol " used by those under the age of 21 years    If you choose to drink, do so in moderation:  Do not drink at all if you are under the age of 21, or if you are or may be pregnant, or have health problems that could be made worse by drinking.  For women, up to 1 drink per day  For men, up to 2 drinks a day    No one should begin drinking or drink more frequently based on potential health benefits    Short-Term Health Risks:  Injuries: motor vehicle crashes, falls, drownings, burns  Violence: homicide, suicide, sexual assault, intimate partner violence  Alcohol poisoning  Reproductive health: risky sexual behaviors, unintended prengnacy, sexually transmitted diseases, miscarriage, stillbirth, fetal alcohol syndrome    Long-Term Health Risks:  Chronic diseases: high blood pressure, heart disease, stroke, liver disease, digestive problems  Cancers: breast, mouth and throat, liver, colon  Learning and memory problems: dementia, poor school performance  Mental health: depression, anxiety, insomnia  Social problems: lost productivity, family problems, unemployment  Alcohol dependence    For support and more information:  Substance Abuse and Mental Health Services Administration  [address]  [place] [address]  Web Address: http://www.[facility].gov    Alcoholics Anonymous        Web Address: http://www.aa.org    https://www.cdc.gov/alcohol/fact-sheets/alcohol-use.htm     © Copyright [employer] 2018 Information is for End User's use only and may not be sold, redistributed or otherwise used for commercial purposes. All illustrations and images included in CareNotes® are the copyrighted property of [employer].D.A[employer]M., Inc. or [employer]

## 2025-05-07 DIAGNOSIS — Z47.1 AFTERCARE FOLLOWING LEFT HIP JOINT REPLACEMENT SURGERY: Primary | ICD-10-CM

## 2025-05-07 DIAGNOSIS — Z48.89 AFTERCARE FOLLOWING SURGERY: ICD-10-CM

## 2025-05-07 DIAGNOSIS — Z96.642 AFTERCARE FOLLOWING LEFT HIP JOINT REPLACEMENT SURGERY: Primary | ICD-10-CM

## 2025-05-16 ENCOUNTER — TELEPHONE (OUTPATIENT)
Dept: OBGYN CLINIC | Facility: HOSPITAL | Age: 80
End: 2025-05-16

## 2025-05-16 DIAGNOSIS — Z96.642 AFTERCARE FOLLOWING LEFT HIP JOINT REPLACEMENT SURGERY: Primary | ICD-10-CM

## 2025-05-16 DIAGNOSIS — Z47.1 AFTERCARE FOLLOWING LEFT HIP JOINT REPLACEMENT SURGERY: Primary | ICD-10-CM

## 2025-05-16 RX ORDER — CEPHALEXIN 500 MG/1
CAPSULE ORAL
Qty: 40 CAPSULE | Refills: 1 | Status: SHIPPED | OUTPATIENT
Start: 2025-05-16 | End: 2027-05-16

## 2025-05-16 NOTE — TELEPHONE ENCOUNTER
Patient called the RX Refill Line. Message is being forwarded to the office.     Patient is requesting antibiotics to take prior to a dental work.    Please send to [facility] [address]    Please contact patient at [phone]

## 2025-05-20 ENCOUNTER — HOSPITAL ENCOUNTER (OUTPATIENT)
Dept: RADIOLOGY | Facility: HOSPITAL | Age: 80
Discharge: HOME | End: 2025-05-20
Attending: ORTHOPAEDIC SURGERY
Payer: COMMERCIAL

## 2025-05-20 ENCOUNTER — OFFICE VISIT (OUTPATIENT)
Dept: OBGYN CLINIC | Facility: HOSPITAL | Age: 80
End: 2025-05-20
Payer: COMMERCIAL

## 2025-05-20 VITALS — HEIGHT: 68 IN | BODY MASS INDEX: 24.52 KG/M2

## 2025-05-20 DIAGNOSIS — Z48.89 AFTERCARE FOLLOWING SURGERY: ICD-10-CM

## 2025-05-20 DIAGNOSIS — Z96.642 AFTERCARE FOLLOWING LEFT HIP JOINT REPLACEMENT SURGERY: Primary | ICD-10-CM

## 2025-05-20 DIAGNOSIS — Z98.890 HISTORY OF OPEN REDUCTION AND INTERNAL FIXATION (ORIF) PROCEDURE: ICD-10-CM

## 2025-05-20 DIAGNOSIS — Z96.642 AFTERCARE FOLLOWING LEFT HIP JOINT REPLACEMENT SURGERY: ICD-10-CM

## 2025-05-20 DIAGNOSIS — Z47.1 AFTERCARE FOLLOWING LEFT HIP JOINT REPLACEMENT SURGERY: ICD-10-CM

## 2025-05-20 DIAGNOSIS — Z47.1 AFTERCARE FOLLOWING LEFT HIP JOINT REPLACEMENT SURGERY: Primary | ICD-10-CM

## 2025-05-20 PROCEDURE — 73070 X-RAY EXAM OF ELBOW: CPT

## 2025-05-20 PROCEDURE — 99213 OFFICE O/P EST LOW 20 MIN: CPT | Performed by: ORTHOPAEDIC SURGERY

## 2025-05-20 PROCEDURE — 73502 X-RAY EXAM HIP UNI 2-3 VIEWS: CPT

## 2025-05-20 NOTE — PROGRESS NOTES
Encounter Provider: Vin Delgado MD   Encounter Date: 05/20/25  Encounter department: [facility] [place]         ASSESSMENT & PLAN:  Assessment & Plan  Aftercare following left hip joint replacement surgery  See other diagnoses     History of open reduction and internal fixation (ORIF) procedure of left elbow  6 months s/p left DEANN and left elbow ORIF, 11/25/2024.   Patient currently doing well  Patient encouraged to remain active  The patient is counseled on prophylactic antibiotic use prior to dental visits.  Follow up in 6 months            To do next visit:  Return for re-check with x-rays.    Scribe Attestation      I,:  Mamadou Musa MA am acting as a scribe while in the presence of the attending physician.:       I,:  Vin Delgado MD personally performed the services described in this documentation    as scribed in my presence.:             ____________________________________________________  CHIEF COMPLAINT:  Chief Complaint   Patient presents with    Left Hip - Post-op    Left Elbow - Post-op         SUBJECTIVE:  Keila Giordano is a 79 y.o. female who presents 6 months s/p left DEANN and left elbow ORIF, 11/25/2024.  She is doing well.  Today she complains of bilateral hip soreness with transitions out of chair and occasional posterior elbow pain.  She is active without repercussion.  She denies medications for these issues.  He denies fever, chills or shortness of breath.          PAST MEDICAL HISTORY:  Past Medical History:   Diagnosis Date    Arthritis     Colitis     Endometriosis     GERD (gastroesophageal reflux disease)        PAST SURGICAL HISTORY:  Past Surgical History:   Procedure Laterality Date    BELPHAROPTOSIS REPAIR Bilateral     COLONOSCOPY  01/04/2023    polyp- 5 years    COLPOPEXY      laparoscopic    ELBOW FRACTURE REPAIR Left 11/25/2024    Procedure: OPEN REDUCTION W/ INTERNAL FIXATION (ORIF) ELBOW, LEFT;  Surgeon: Vin Delgado,  "MD;  Location: [facility];  Service: Orthopedics    ELBOW SURGERY  11/25/24    HIP ARTHROPLASTY Left 11/25/2024    Procedure: ARTHROPLASTY HIP TOTAL, LEFT;  Surgeon: Vin Delgado MD;  Location: [facility];  Service: Orthopedics    JOINT REPLACEMENT  11/25/24    CT HYSTEROSCOPY BX ENDOMETRIUM&/POLYPC W/WO D&C N/A 2/17/2023    Procedure: DILATATION AND CURETTAGE (D&C) WITH HYSTEROSCOPY;  Surgeon: Pablo Pappas MD;  Location: [facility];  Service: Gynecology    CT LAPAROSCOPY W/RMVL ADNEXAL STRUCTURES Bilateral 2/17/2023    Procedure: DIAGNOSTIC LAPAROSCOPY BILATERAL REMOVAL OF OVARIES AND FALLOPIAN TUBES, PELVIC WASHINGS AND ANY OTHER NECESSARY PROCEDURES;  Surgeon: Pablo Pappas MD;  Location: [facility];  Service: Gynecology    TONSILLECTOMY         FAMILY HISTORY:  Family History   Problem Relation Age of Onset    Cancer Father     Pancreatic cancer Father     Diabetes Mother     Dementia Mother     Heart failure Mother         Death at 88 years    Dementia Mother     Atrial fibrillation Mother     Miscarriages / Stillbirths Mother     Sleep apnea Brother     Atrial fibrillation Brother     Breast cancer Paternal Aunt     Arthritis Maternal Grandmother     Breast cancer Paternal Aunt        SOCIAL HISTORY:  Social History[1]    MEDICATIONS:  Current Medications[2]    ALLERGIES:  Allergies[3]    LABS:  HgA1c: No results found for: \"HGBA1C\"  BMP:   Lab Results   Component Value Date    CALCIUM 8.7 11/29/2024    K 4.1 11/29/2024    CO2 28 11/29/2024     11/29/2024    BUN 12 11/29/2024    CREATININE 0.59 (L) 11/29/2024     CBC: No components found for: \"CBC\"    _____________________________________________________  PHYSICAL EXAMINATION:  Vital signs: Ht 5' 8.15\" (1.731 m)   BMI 24.52 kg/m²   General: No acute distress, awake and alert  Psychiatric: Mood and affect appear appropriate  HEENT: Trachea Midline, No torticollis, no apparent facial trauma  Cardiovascular: No audible murmurs; Extremities " "appear perfused  Pulmonary: No audible wheezing or stridor  Skin: No open lesions; see further details (if any) below    Ortho Exam:  Left hip:  Well healed posterior incision  Good arc of motion with no pain  Patient sits comfortably in chair with hip flexed at 90 degrees  Patient stands from seated position without assistance  Calf compartments soft and supple  Sensation intact  Toes are warm sensate and mobile    Left elbow:  Well healed incisional scar  Extension 140  Flexion 25  Full rotation at wrist   Sensation intact distally       _____________________________________________________  STUDIES REVIEWED:    The attending physician has personally reviewed the pertinent films in PACS and interpretation is as follows:  Left elbow x-ray:  Healed proximal ulnar fracture with no evidence of hardware failure.      Left hip x-ray:  Well aligned prosthesis with no acute changes.      PROCEDURES PERFORMED:  Procedures      None Preformed       Portions of the record may have been created with voice recognition software.  Occasional wrong word or \"sound a like\" substitutions may have occurred due to the inherent limitations of voice recognition software.  Read the chart carefully and recognize, using context, where substitutions have occurred.             [1]   Social History  Tobacco Use    Smoking status: Former     Types: Cigarettes     Passive exposure: Never    Smokeless tobacco: Never   Vaping Use    Vaping status: Former   Substance Use Topics    Alcohol use: Yes     Alcohol/week: 5.0 standard drinks of alcohol     Types: 5 Glasses of wine per week    Drug use: No   [2]   Current Outpatient Medications:     B Complex Vitamins (VITAMIN B COMPLEX 100 IJ), , Disp: , Rfl:     cephalexin (KEFLEX) 500 mg capsule, Take 4 tablets 1 hour before dental visit.  Repeat is necessary, Disp: 40 capsule, Rfl: 1    cholecalciferol (VITAMIN D3) 1,000 units tablet, Take 5,000 Units by mouth daily, Disp: , Rfl:     Coenzyme Q10 " (CoQ-10) 100 MG capsule, Take 100 mg by mouth daily, Disp: , Rfl:     Omega-3 Fatty Acids (OMEGA 3 PO), Take by mouth Patient takes 2 tablets daily, Disp: , Rfl:     Probiotic Product (PROBIOTIC PO), Take by mouth Take one tablet by mouth daily., Disp: , Rfl:   [3] No Known Allergies

## (undated) DEVICE — HOOD: Brand: T7PLUS

## (undated) DEVICE — CYSTO TUBING SINGLE IRRIGATION

## (undated) DEVICE — UNDER BUTTOCKS DRAPE W/FLUID CONTROL POUCH: Brand: CONVERTORS

## (undated) DEVICE — SKN PRP WNG SPNGE PVP SCRB STR: Brand: MEDLINE INDUSTRIES, INC.

## (undated) DEVICE — IRRIG ENDO FLO TUBING

## (undated) DEVICE — SCD SEQUENTIAL COMPRESSION COMFORT SLEEVE MEDIUM KNEE LENGTH: Brand: KENDALL SCD

## (undated) DEVICE — 2.5MM DRILL BIT/QC/GOLD/110MM

## (undated) DEVICE — TISSUE RETRIEVAL SYSTEM: Brand: INZII RETRIEVAL SYSTEM

## (undated) DEVICE — CAPIT HIP MOP -POLY CEMEMTED

## (undated) DEVICE — ABDUCTION PILLOW FOAM POSITIONER: Brand: CARDINAL HEALTH

## (undated) DEVICE — SUT MONOCRYL 4-0 PS-2 18 IN Y496G

## (undated) DEVICE — BETHLEHEM UNIVERSAL MINOR VAG: Brand: CARDINAL HEALTH

## (undated) DEVICE — TROCAR: Brand: KII® SLEEVE

## (undated) DEVICE — HANDPIECE SET WITH RETRACTABLE COAXIAL FAN SPRAY TIP AND SUCTION TUBE: Brand: INTERPULSE

## (undated) DEVICE — TROCAR: Brand: KII FIOS FIRST ENTRY

## (undated) DEVICE — ADHESIVE SKIN HIGH VISCOSITY EXOFIN 1ML

## (undated) DEVICE — PAD GROUNDING DUAL ADULT

## (undated) DEVICE — SUT VICRYL PLUS 1 CTB-1 36 IN VCPB947H

## (undated) DEVICE — STOCKINETTE REGULAR

## (undated) DEVICE — SUT VICRYL PLUS 2-0 CTB-1 27 IN VCPB259H

## (undated) DEVICE — PREMIUM DRY TRAY LF: Brand: MEDLINE INDUSTRIES, INC.

## (undated) DEVICE — 2.8MM PERCUTANEOUS DRILL BIT F/LCP® PL QC/200MM/100MM CALIB: Brand: LCP

## (undated) DEVICE — PACK PBDS MINOR GYN LAP RF

## (undated) DEVICE — TRAY FOLEY 16FR URIMETER SILICONE SURESTEP

## (undated) DEVICE — STERILE PITCHER PACK: Brand: CARDINAL HEALTH

## (undated) DEVICE — GLOVE PI ULTRA TOUCH SZ.7.0

## (undated) DEVICE — GLOVE SRG BIOGEL 8

## (undated) DEVICE — DRAPE SHEET X-LG

## (undated) DEVICE — CHLORAPREP HI-LITE 26ML ORANGE

## (undated) DEVICE — ENSEAL X1 TISSUE SEALER, CURVED JAW, 37 CM SHAFT LENGTH: Brand: ENSEAL

## (undated) DEVICE — 2108 SERIES SAGITTAL BLADE (18.6 X 0.64 X 61.1MM)

## (undated) DEVICE — BETHLEHEM TOTAL HIP, KIT: Brand: CARDINAL HEALTH

## (undated) DEVICE — BETHLEHEM UNIV MAJ EXT ,KIT: Brand: CARDINAL HEALTH

## (undated) DEVICE — SUT VICRYL 0 UR-6 27 IN J603H

## (undated) DEVICE — CUFF TOURNIQUET 18 X 4 IN QUICK CONNECT DISP 1 BLADDER

## (undated) DEVICE — PROXIMATE PLUS MD MULTI-DIRECTIONAL RELEASE SKIN STAPLERS CONTAINS 35 STAINLESS STEEL STAPLES APPROXIMATE CLOSED DIMENSIONS: 6.9MM X 3.9MM WIDE: Brand: PROXIMATE

## (undated) DEVICE — Device: Brand: OMNICLOSE TROCAR SITE CLOSURE DEVICE

## (undated) DEVICE — KERLIX BANDAGE ROLL: Brand: KERLIX

## (undated) DEVICE — TRAY FOLEY 16FR URIMETER SURESTEP

## (undated) DEVICE — SUT VICRYL PLUS 0 CTB-1 27 IN VCPB260H

## (undated) DEVICE — PENCIL ELECTROSURG E-Z CLEAN -0035H

## (undated) DEVICE — CHLORHEXIDINE 4PCT 4 OZ

## (undated) DEVICE — C-ARM: Brand: UNBRANDED

## (undated) DEVICE — DRESSING MEPILEX AG BORDER POST-OP 4 X 12 IN

## (undated) DEVICE — TUBING SMOKE EVAC W/FILTRATION DEVICE PLUMEPORT ACTIV

## (undated) DEVICE — GLOVE INDICATOR PI UNDERGLOVE SZ 7.5 BLUE

## (undated) DEVICE — GLOVE INDICATOR PI UNDERGLOVE SZ 8.5 BLUE

## (undated) DEVICE — INTENDED FOR TISSUE SEPARATION, AND OTHER PROCEDURES THAT REQUIRE A SHARP SURGICAL BLADE TO PUNCTURE OR CUT.: Brand: BARD-PARKER SAFETY BLADES SIZE 11, STERILE

## (undated) DEVICE — HOOD WITH PEEL AWAY FACE SHIELD: Brand: T7PLUS